# Patient Record
Sex: FEMALE | Race: OTHER | Employment: FULL TIME | URBAN - METROPOLITAN AREA
[De-identification: names, ages, dates, MRNs, and addresses within clinical notes are randomized per-mention and may not be internally consistent; named-entity substitution may affect disease eponyms.]

---

## 2017-03-31 ENCOUNTER — OFFICE VISIT (OUTPATIENT)
Dept: PRIMARY CARE CLINIC | Age: 22
End: 2017-03-31

## 2017-03-31 VITALS
WEIGHT: 90 LBS | TEMPERATURE: 97.1 F | HEART RATE: 77 BPM | DIASTOLIC BLOOD PRESSURE: 80 MMHG | BODY MASS INDEX: 17.67 KG/M2 | HEIGHT: 60 IN | OXYGEN SATURATION: 100 % | SYSTOLIC BLOOD PRESSURE: 120 MMHG

## 2017-03-31 DIAGNOSIS — Z00.00 PHYSICAL EXAM: Primary | ICD-10-CM

## 2017-03-31 DIAGNOSIS — L98.9 SKIN ABNORMALITY: ICD-10-CM

## 2017-03-31 DIAGNOSIS — F41.9 ANXIETY: ICD-10-CM

## 2017-03-31 PROCEDURE — 99385 PREV VISIT NEW AGE 18-39: CPT | Performed by: INTERNAL MEDICINE

## 2017-03-31 ASSESSMENT — ENCOUNTER SYMPTOMS
ABDOMINAL DISTENTION: 0
CONSTIPATION: 0
SHORTNESS OF BREATH: 0
COLOR CHANGE: 1
DIARRHEA: 0
ABDOMINAL PAIN: 0
CHEST TIGHTNESS: 0
GASTROINTESTINAL NEGATIVE: 1
WHEEZING: 0
EYES NEGATIVE: 1
COUGH: 0
BLOOD IN STOOL: 0

## 2017-12-04 ENCOUNTER — OFFICE VISIT (OUTPATIENT)
Dept: FAMILY MEDICINE CLINIC | Age: 22
End: 2017-12-04

## 2017-12-04 VITALS
OXYGEN SATURATION: 99 % | RESPIRATION RATE: 15 BRPM | HEART RATE: 91 BPM | HEIGHT: 60 IN | DIASTOLIC BLOOD PRESSURE: 78 MMHG | BODY MASS INDEX: 17.47 KG/M2 | SYSTOLIC BLOOD PRESSURE: 110 MMHG | WEIGHT: 89 LBS

## 2017-12-04 DIAGNOSIS — Z23 NEED FOR TDAP VACCINATION: ICD-10-CM

## 2017-12-04 DIAGNOSIS — N92.6 IRREGULAR MENSTRUATION: ICD-10-CM

## 2017-12-04 DIAGNOSIS — Z00.00 ANNUAL PHYSICAL EXAM: Primary | ICD-10-CM

## 2017-12-04 DIAGNOSIS — Z23 NEED FOR INFLUENZA VACCINATION: ICD-10-CM

## 2017-12-04 PROCEDURE — 90471 IMMUNIZATION ADMIN: CPT | Performed by: FAMILY MEDICINE

## 2017-12-04 PROCEDURE — 90472 IMMUNIZATION ADMIN EACH ADD: CPT | Performed by: FAMILY MEDICINE

## 2017-12-04 PROCEDURE — 99385 PREV VISIT NEW AGE 18-39: CPT | Performed by: FAMILY MEDICINE

## 2017-12-04 PROCEDURE — 90715 TDAP VACCINE 7 YRS/> IM: CPT | Performed by: FAMILY MEDICINE

## 2017-12-04 PROCEDURE — 90630 INFLUENZA, QUADV, 18-64 YRS, ID, PF, MICRO INJ, 0.1ML (FLUZONE QUADV, PF): CPT | Performed by: FAMILY MEDICINE

## 2017-12-04 RX ORDER — NORETHINDRONE ACETATE AND ETHINYL ESTRADIOL 1MG-20(21)
1 KIT ORAL DAILY
COMMUNITY
End: 2018-01-30 | Stop reason: ALTCHOICE

## 2017-12-04 ASSESSMENT — PATIENT HEALTH QUESTIONNAIRE - PHQ9
2. FEELING DOWN, DEPRESSED OR HOPELESS: 0
SUM OF ALL RESPONSES TO PHQ9 QUESTIONS 1 & 2: 0
1. LITTLE INTEREST OR PLEASURE IN DOING THINGS: 0
SUM OF ALL RESPONSES TO PHQ QUESTIONS 1-9: 0

## 2017-12-04 NOTE — PATIENT INSTRUCTIONS
Patient Education        Heavy Menstrual Periods: Care Instructions  Your Care Instructions    Many women get heavy menstrual periods and painful cramps. For some women, this means passing large blood clots and changing sanitary pads or tampons often. You may also have periods that last longer than 7 days. A change in hormones or an irritation in the uterus can cause heavy bleeding. Women who are overweight are more likely to have heavy menstrual periods. But there may not be a specific cause for your heavy menstrual periods. Your doctor may recommend hormone treatments to slow or stop your periods. If a fibroid (a growth that is not cancer) is causing your heavy bleeding, your doctor may recommend surgery or other treatments to remove the growth. Because blood loss from heavy menstrual periods can make you very tired and weak (anemic), your doctor may recommend that you take extra iron. Follow-up care is a key part of your treatment and safety. Be sure to make and go to all appointments, and call your doctor if you are having problems. It's also a good idea to know your test results and keep a list of the medicines you take. How can you care for yourself at home? · Get plenty of rest.  · Keep a record of your periods. Write down when your period begins and ends and how much flow you have. That means counting the number of pads and tampons you use. Note whether they are soaked. Note any other symptoms. Take this record to your doctor appointments. · Take your medicines exactly as prescribed. Call your doctor if you think you are having a problem with your medicine. · Take pain medicines exactly as directed. ¨ If the doctor gave you a prescription medicine for pain, take it as prescribed. ¨ If you are not taking a prescription pain medicine, ask your doctor if you can take an over-the-counter medicine. · Try to reach a healthy weight.  If you are trying to lose weight, do it slowly with your doctor's advice. · If you are taking iron pills:  ¨ Try to take the pills about 1 hour before or 2 hours after meals. But you may need to take iron with some food to avoid an upset stomach. ¨ Vitamin C (from food or pills) helps your body absorb iron. Try taking iron pills with a glass of orange juice or other citrus fruit juice. ¨ Do not take antacids or drink milk or caffeine drinks (such as coffee, tea, or cola) at the same time or within 2 hours of the time that you take your iron. They can make it hard for your body to absorb the iron. ¨ Iron pills may cause stomach problems, such as heartburn, nausea, diarrhea, constipation, and cramps. Be sure to drink plenty of fluids, and include fruits, vegetables, and fiber in your diet each day. ¨ If you forget to take an iron pill, do not take a double dose of iron the next time you take a pill. ¨ Keep iron pills out of the reach of small children. An overdose of iron can be very dangerous. When should you call for help? Call 911 anytime you think you may need emergency care. For example, call if:  · You passed out (lost consciousness). · You have sudden, severe pain in your belly or pelvis. Call your doctor now or seek immediate medical care if:  · You have severe vaginal bleeding. This means that you are soaking through your usual pads or tampons each hour for 2 or more hours. · You are dizzy or lightheaded, or you feel like you may faint. · You have belly or pelvic pain when not menstruating. · You have a fever. · You have vaginal discharge with a bad odor. Watch closely for changes in your health, and be sure to contact your doctor if:  · Your heavy periods are disrupting your life. · You have vaginal bleeding when you do not expect it or after menopause. · You do not get better as expected. Where can you learn more? Go to https://[email or web address]. org and sign in to your [employer] account.  Enter F477 in the WhenSoon box to learn

## 2017-12-05 DIAGNOSIS — Z00.00 ANNUAL PHYSICAL EXAM: ICD-10-CM

## 2017-12-05 DIAGNOSIS — N92.6 IRREGULAR MENSTRUATION: ICD-10-CM

## 2017-12-05 LAB
BASOPHILS ABSOLUTE: 0.1 K/UL (ref 0–0.2)
BASOPHILS RELATIVE PERCENT: 1 %
CHOLESTEROL, FASTING: 132 MG/DL (ref 0–199)
EOSINOPHILS ABSOLUTE: 0.2 K/UL (ref 0–0.6)
EOSINOPHILS RELATIVE PERCENT: 3.1 %
FOLLICLE STIMULATING HORMONE: 5.8 MIU/ML
GLUCOSE FASTING: 84 MG/DL (ref 70–99)
HCT VFR BLD CALC: 37.9 % (ref 36–48)
HDLC SERPL-MCNC: 65 MG/DL (ref 40–60)
HEMOGLOBIN: 12.1 G/DL (ref 12–16)
LDL CHOLESTEROL CALCULATED: 54 MG/DL
LUTEINIZING HORMONE: 5 MIU/ML
LYMPHOCYTES ABSOLUTE: 1.6 K/UL (ref 1–5.1)
LYMPHOCYTES RELATIVE PERCENT: 20.1 %
MCH RBC QN AUTO: 27.7 PG (ref 26–34)
MCHC RBC AUTO-ENTMCNC: 31.9 G/DL (ref 31–36)
MCV RBC AUTO: 86.9 FL (ref 80–100)
MONOCYTES ABSOLUTE: 0.7 K/UL (ref 0–1.3)
MONOCYTES RELATIVE PERCENT: 9.3 %
NEUTROPHILS ABSOLUTE: 5.2 K/UL (ref 1.7–7.7)
NEUTROPHILS RELATIVE PERCENT: 66.5 %
PDW BLD-RTO: 14.6 % (ref 12.4–15.4)
PLATELET # BLD: 351 K/UL (ref 135–450)
PMV BLD AUTO: 9.4 FL (ref 5–10.5)
PROGESTERONE LEVEL: <0.05 NG/ML
RBC # BLD: 4.37 M/UL (ref 4–5.2)
TRIGLYCERIDE, FASTING: 63 MG/DL (ref 0–150)
TSH REFLEX: 2.25 UIU/ML (ref 0.27–4.2)
VLDLC SERPL CALC-MCNC: 13 MG/DL
WBC # BLD: 7.8 K/UL (ref 4–11)

## 2017-12-07 ENCOUNTER — TELEPHONE (OUTPATIENT)
Dept: FAMILY MEDICINE CLINIC | Age: 22
End: 2017-12-07

## 2017-12-07 LAB
SEX HORMONE BINDING GLOBULIN: 96 NMOL/L (ref 30–135)
TESTOSTERONE FREE-NONMALE: 2.6 PG/ML (ref 0.8–7.4)
TESTOSTERONE TOTAL: 31 NG/DL (ref 20–70)

## 2018-01-30 ENCOUNTER — OFFICE VISIT (OUTPATIENT)
Dept: FAMILY MEDICINE CLINIC | Age: 23
End: 2018-01-30

## 2018-01-30 VITALS
HEART RATE: 90 BPM | RESPIRATION RATE: 15 BRPM | BODY MASS INDEX: 18.06 KG/M2 | DIASTOLIC BLOOD PRESSURE: 68 MMHG | WEIGHT: 92 LBS | OXYGEN SATURATION: 98 % | HEIGHT: 60 IN | SYSTOLIC BLOOD PRESSURE: 112 MMHG

## 2018-01-30 DIAGNOSIS — F41.9 ANXIETY: ICD-10-CM

## 2018-01-30 DIAGNOSIS — R07.9 CHEST PAIN, UNSPECIFIED TYPE: Primary | ICD-10-CM

## 2018-01-30 PROCEDURE — G8427 DOCREV CUR MEDS BY ELIG CLIN: HCPCS | Performed by: FAMILY MEDICINE

## 2018-01-30 PROCEDURE — G8419 CALC BMI OUT NRM PARAM NOF/U: HCPCS | Performed by: FAMILY MEDICINE

## 2018-01-30 PROCEDURE — 1036F TOBACCO NON-USER: CPT | Performed by: FAMILY MEDICINE

## 2018-01-30 PROCEDURE — 99213 OFFICE O/P EST LOW 20 MIN: CPT | Performed by: FAMILY MEDICINE

## 2018-01-30 PROCEDURE — G8484 FLU IMMUNIZE NO ADMIN: HCPCS | Performed by: FAMILY MEDICINE

## 2018-01-30 RX ORDER — DROSPIRENONE AND ETHINYL ESTRADIOL 0.03MG-3MG
1 KIT ORAL DAILY
COMMUNITY
End: 2019-10-01 | Stop reason: ALTCHOICE

## 2018-02-15 ENCOUNTER — TELEPHONE (OUTPATIENT)
Dept: FAMILY MEDICINE CLINIC | Age: 23
End: 2018-02-15

## 2018-02-15 DIAGNOSIS — F41.9 ANXIETY: Primary | ICD-10-CM

## 2018-02-16 RX ORDER — PAROXETINE 10 MG/1
10 TABLET, FILM COATED ORAL EVERY MORNING
Qty: 30 TABLET | Refills: 0 | Status: SHIPPED | OUTPATIENT
Start: 2018-02-16 | End: 2018-03-01 | Stop reason: CLARIF

## 2018-02-21 ENCOUNTER — TELEPHONE (OUTPATIENT)
Dept: FAMILY MEDICINE CLINIC | Age: 23
End: 2018-02-21

## 2018-02-21 NOTE — TELEPHONE ENCOUNTER
Pt would like a call back to discuss some concerns that she'ds having regarding her anxiety medication. She says that she started taking medication yesterday without food and started feeling stomach pain, and felt nauseous all day. This morning she felt a rapid heart beat, and had lack of sleep.  Please advise

## 2018-02-22 NOTE — TELEPHONE ENCOUNTER
Called and spoke with pt. She is having side effects from Paxil. 30 min after first dose 2 days ago, felt nauseated with mild HA, went on the next day. The next morning she woke up with rapid heartbeat, and difficulty sleeping. Patient is concerned about her rapid heart beating because this is not the first time that she's had this issue. She made appointment with cardiologist.  I told the patient to go ahead and hold the Paxil for now, see cardiologist.  Katia Beckford to follow-up in the clinic with me once she seen a cardiologist.  Patient expressed understanding.

## 2018-03-01 ENCOUNTER — OFFICE VISIT (OUTPATIENT)
Dept: CARDIOLOGY CLINIC | Age: 23
End: 2018-03-01

## 2018-03-01 VITALS
HEART RATE: 115 BPM | WEIGHT: 91 LBS | BODY MASS INDEX: 17.77 KG/M2 | SYSTOLIC BLOOD PRESSURE: 120 MMHG | DIASTOLIC BLOOD PRESSURE: 80 MMHG

## 2018-03-01 DIAGNOSIS — R00.2 HEART PALPITATIONS: ICD-10-CM

## 2018-03-01 DIAGNOSIS — I49.9 IRREGULAR HEART RATE: Primary | ICD-10-CM

## 2018-03-01 DIAGNOSIS — F41.9 ANXIETY: ICD-10-CM

## 2018-03-01 PROCEDURE — G8419 CALC BMI OUT NRM PARAM NOF/U: HCPCS | Performed by: INTERNAL MEDICINE

## 2018-03-01 PROCEDURE — 99214 OFFICE O/P EST MOD 30 MIN: CPT | Performed by: INTERNAL MEDICINE

## 2018-03-01 PROCEDURE — 1036F TOBACCO NON-USER: CPT | Performed by: INTERNAL MEDICINE

## 2018-03-01 PROCEDURE — G8484 FLU IMMUNIZE NO ADMIN: HCPCS | Performed by: INTERNAL MEDICINE

## 2018-03-01 PROCEDURE — G8427 DOCREV CUR MEDS BY ELIG CLIN: HCPCS | Performed by: INTERNAL MEDICINE

## 2018-03-01 PROCEDURE — 93000 ELECTROCARDIOGRAM COMPLETE: CPT | Performed by: INTERNAL MEDICINE

## 2018-03-01 ASSESSMENT — ENCOUNTER SYMPTOMS
ALLERGIC/IMMUNOLOGIC NEGATIVE: 1
RESPIRATORY NEGATIVE: 1
EYES NEGATIVE: 1
GASTROINTESTINAL NEGATIVE: 1

## 2018-03-01 NOTE — PROGRESS NOTES
paxil. Would consider Cardizem if palpitations continue. Other than sinus tachycardia she has a normal ECG.

## 2018-03-16 ENCOUNTER — HOSPITAL ENCOUNTER (OUTPATIENT)
Dept: NON INVASIVE DIAGNOSTICS | Age: 23
Discharge: HOME | End: 2018-03-16
Attending: INTERNAL MEDICINE | Admitting: INTERNAL MEDICINE

## 2018-03-16 DIAGNOSIS — I49.9 CARDIAC ARRHYTHMIA: ICD-10-CM

## 2018-03-16 LAB
LV EF: 58 %
LVEF MODALITY: NORMAL

## 2018-03-20 ENCOUNTER — TELEPHONE (OUTPATIENT)
Dept: CARDIOLOGY CLINIC | Age: 23
End: 2018-03-20

## 2018-03-21 ENCOUNTER — OFFICE VISIT (OUTPATIENT)
Dept: CARDIOLOGY CLINIC | Age: 23
End: 2018-03-21

## 2018-03-21 VITALS
DIASTOLIC BLOOD PRESSURE: 60 MMHG | SYSTOLIC BLOOD PRESSURE: 110 MMHG | BODY MASS INDEX: 17.81 KG/M2 | WEIGHT: 91.2 LBS | HEART RATE: 88 BPM

## 2018-03-21 DIAGNOSIS — R00.2 HEART PALPITATIONS: Primary | ICD-10-CM

## 2018-03-21 PROCEDURE — 99213 OFFICE O/P EST LOW 20 MIN: CPT | Performed by: INTERNAL MEDICINE

## 2018-03-21 PROCEDURE — G8419 CALC BMI OUT NRM PARAM NOF/U: HCPCS | Performed by: INTERNAL MEDICINE

## 2018-03-21 PROCEDURE — G8482 FLU IMMUNIZE ORDER/ADMIN: HCPCS | Performed by: INTERNAL MEDICINE

## 2018-03-21 PROCEDURE — G8427 DOCREV CUR MEDS BY ELIG CLIN: HCPCS | Performed by: INTERNAL MEDICINE

## 2018-03-21 PROCEDURE — 1036F TOBACCO NON-USER: CPT | Performed by: INTERNAL MEDICINE

## 2018-03-21 ASSESSMENT — ENCOUNTER SYMPTOMS
ALLERGIC/IMMUNOLOGIC NEGATIVE: 1
RESPIRATORY NEGATIVE: 1
GASTROINTESTINAL NEGATIVE: 1
EYES NEGATIVE: 1

## 2018-04-18 ENCOUNTER — OFFICE VISIT (OUTPATIENT)
Dept: FAMILY MEDICINE CLINIC | Age: 23
End: 2018-04-18

## 2018-04-18 VITALS
HEART RATE: 96 BPM | HEIGHT: 60 IN | RESPIRATION RATE: 16 BRPM | DIASTOLIC BLOOD PRESSURE: 66 MMHG | OXYGEN SATURATION: 99 % | BODY MASS INDEX: 18.26 KG/M2 | SYSTOLIC BLOOD PRESSURE: 100 MMHG | WEIGHT: 93 LBS

## 2018-04-18 DIAGNOSIS — M54.9 MID-BACK PAIN, ACUTE: Primary | ICD-10-CM

## 2018-04-18 DIAGNOSIS — Z78.9 IMMUNE TO MUMPS: ICD-10-CM

## 2018-04-18 DIAGNOSIS — M54.9 MID-BACK PAIN, ACUTE: ICD-10-CM

## 2018-04-18 LAB
BILIRUBIN, POC: NORMAL
BLOOD URINE, POC: NORMAL
CLARITY, POC: CLEAR
COLOR, POC: YELLOW
D DIMER: <200 NG/ML DDU (ref 0–229)
GLUCOSE URINE, POC: NORMAL
KETONES, POC: NORMAL
LEUKOCYTE EST, POC: NORMAL
NITRITE, POC: NORMAL
PH, POC: 7
PROTEIN, POC: NORMAL
SPECIFIC GRAVITY, POC: 1.02
UROBILINOGEN, POC: 0.2

## 2018-04-18 PROCEDURE — 81002 URINALYSIS NONAUTO W/O SCOPE: CPT | Performed by: FAMILY MEDICINE

## 2018-04-18 PROCEDURE — 99213 OFFICE O/P EST LOW 20 MIN: CPT | Performed by: FAMILY MEDICINE

## 2018-04-18 PROCEDURE — G8419 CALC BMI OUT NRM PARAM NOF/U: HCPCS | Performed by: FAMILY MEDICINE

## 2018-04-18 PROCEDURE — G8427 DOCREV CUR MEDS BY ELIG CLIN: HCPCS | Performed by: FAMILY MEDICINE

## 2018-04-18 PROCEDURE — 1036F TOBACCO NON-USER: CPT | Performed by: FAMILY MEDICINE

## 2018-04-19 ENCOUNTER — TELEPHONE (OUTPATIENT)
Dept: FAMILY MEDICINE CLINIC | Age: 23
End: 2018-04-19

## 2018-04-20 LAB — MUV IGG SER QL: 88.1 AU/ML

## 2018-06-04 ENCOUNTER — OFFICE VISIT (OUTPATIENT)
Dept: FAMILY MEDICINE CLINIC | Age: 23
End: 2018-06-04

## 2018-06-04 ENCOUNTER — TELEPHONE (OUTPATIENT)
Dept: CARDIOLOGY CLINIC | Age: 23
End: 2018-06-04

## 2018-06-04 VITALS
OXYGEN SATURATION: 98 % | DIASTOLIC BLOOD PRESSURE: 80 MMHG | HEART RATE: 102 BPM | WEIGHT: 92.6 LBS | HEIGHT: 60 IN | RESPIRATION RATE: 16 BRPM | BODY MASS INDEX: 18.18 KG/M2 | SYSTOLIC BLOOD PRESSURE: 120 MMHG

## 2018-06-04 DIAGNOSIS — M54.50 ACUTE BILATERAL LOW BACK PAIN WITHOUT SCIATICA: Primary | ICD-10-CM

## 2018-06-04 DIAGNOSIS — R87.619 ABNORMAL CERVICAL PAPANICOLAOU SMEAR, UNSPECIFIED ABNORMAL PAP FINDING: ICD-10-CM

## 2018-06-04 DIAGNOSIS — R51.9 NONINTRACTABLE HEADACHE, UNSPECIFIED CHRONICITY PATTERN, UNSPECIFIED HEADACHE TYPE: ICD-10-CM

## 2018-06-04 PROBLEM — A63.0 HPV (HUMAN PAPILLOMA VIRUS) ANOGENITAL INFECTION: Status: ACTIVE | Noted: 2018-05-01

## 2018-06-04 LAB
BILIRUBIN, POC: NORMAL
BLOOD URINE, POC: NORMAL
CLARITY, POC: CLEAR
COLOR, POC: YELLOW
GLUCOSE URINE, POC: NORMAL
KETONES, POC: NORMAL
LEUKOCYTE EST, POC: NORMAL
NITRITE, POC: NORMAL
PH, POC: 6.5
PROTEIN, POC: NORMAL
SPECIFIC GRAVITY, POC: 1.01
UROBILINOGEN, POC: 0.2

## 2018-06-04 PROCEDURE — 1036F TOBACCO NON-USER: CPT | Performed by: NURSE PRACTITIONER

## 2018-06-04 PROCEDURE — 99214 OFFICE O/P EST MOD 30 MIN: CPT | Performed by: NURSE PRACTITIONER

## 2018-06-04 PROCEDURE — G8427 DOCREV CUR MEDS BY ELIG CLIN: HCPCS | Performed by: NURSE PRACTITIONER

## 2018-06-04 PROCEDURE — G8419 CALC BMI OUT NRM PARAM NOF/U: HCPCS | Performed by: NURSE PRACTITIONER

## 2018-06-04 PROCEDURE — 81002 URINALYSIS NONAUTO W/O SCOPE: CPT | Performed by: NURSE PRACTITIONER

## 2018-06-04 RX ORDER — AMOXICILLIN 500 MG/1
500 CAPSULE ORAL 2 TIMES DAILY
Qty: 20 CAPSULE | Refills: 0 | Status: SHIPPED | OUTPATIENT
Start: 2018-06-04 | End: 2018-06-14

## 2018-06-04 ASSESSMENT — ENCOUNTER SYMPTOMS
COUGH: 0
SINUS PAIN: 0
ABDOMINAL PAIN: 0
SHORTNESS OF BREATH: 1
SORE THROAT: 0
BACK PAIN: 1

## 2018-06-07 ENCOUNTER — OFFICE VISIT (OUTPATIENT)
Dept: CARDIOLOGY CLINIC | Age: 23
End: 2018-06-07

## 2018-06-07 VITALS
WEIGHT: 93 LBS | BODY MASS INDEX: 18.16 KG/M2 | DIASTOLIC BLOOD PRESSURE: 60 MMHG | HEART RATE: 108 BPM | SYSTOLIC BLOOD PRESSURE: 132 MMHG

## 2018-06-07 DIAGNOSIS — R00.2 HEART PALPITATIONS: Primary | ICD-10-CM

## 2018-06-07 PROCEDURE — 93000 ELECTROCARDIOGRAM COMPLETE: CPT | Performed by: INTERNAL MEDICINE

## 2018-06-07 PROCEDURE — 1036F TOBACCO NON-USER: CPT | Performed by: INTERNAL MEDICINE

## 2018-06-07 PROCEDURE — G8419 CALC BMI OUT NRM PARAM NOF/U: HCPCS | Performed by: INTERNAL MEDICINE

## 2018-06-07 PROCEDURE — G8427 DOCREV CUR MEDS BY ELIG CLIN: HCPCS | Performed by: INTERNAL MEDICINE

## 2018-06-07 PROCEDURE — 99213 OFFICE O/P EST LOW 20 MIN: CPT | Performed by: INTERNAL MEDICINE

## 2018-06-07 RX ORDER — METOPROLOL SUCCINATE 25 MG/1
25 TABLET, EXTENDED RELEASE ORAL DAILY
Qty: 30 TABLET | Refills: 5 | Status: SHIPPED | OUTPATIENT
Start: 2018-06-07 | End: 2018-08-03 | Stop reason: ALTCHOICE

## 2018-06-07 ASSESSMENT — ENCOUNTER SYMPTOMS
EYES NEGATIVE: 1
ALLERGIC/IMMUNOLOGIC NEGATIVE: 1
GASTROINTESTINAL NEGATIVE: 1
RESPIRATORY NEGATIVE: 1

## 2018-08-03 ENCOUNTER — OFFICE VISIT (OUTPATIENT)
Dept: INTERNAL MEDICINE CLINIC | Age: 23
End: 2018-08-03

## 2018-08-03 VITALS
TEMPERATURE: 97 F | DIASTOLIC BLOOD PRESSURE: 74 MMHG | HEART RATE: 81 BPM | OXYGEN SATURATION: 99 % | WEIGHT: 95 LBS | HEIGHT: 60 IN | BODY MASS INDEX: 18.65 KG/M2 | SYSTOLIC BLOOD PRESSURE: 110 MMHG

## 2018-08-03 DIAGNOSIS — J01.90 SUBACUTE SINUSITIS, UNSPECIFIED LOCATION: ICD-10-CM

## 2018-08-03 DIAGNOSIS — F41.9 ANXIETY: Primary | ICD-10-CM

## 2018-08-03 DIAGNOSIS — R06.02 SOB (SHORTNESS OF BREATH): ICD-10-CM

## 2018-08-03 PROCEDURE — 1036F TOBACCO NON-USER: CPT | Performed by: NURSE PRACTITIONER

## 2018-08-03 PROCEDURE — 99213 OFFICE O/P EST LOW 20 MIN: CPT | Performed by: NURSE PRACTITIONER

## 2018-08-03 PROCEDURE — G8420 CALC BMI NORM PARAMETERS: HCPCS | Performed by: NURSE PRACTITIONER

## 2018-08-03 PROCEDURE — G8427 DOCREV CUR MEDS BY ELIG CLIN: HCPCS | Performed by: NURSE PRACTITIONER

## 2018-08-03 RX ORDER — AMOXICILLIN AND CLAVULANATE POTASSIUM 875; 125 MG/1; MG/1
1 TABLET, FILM COATED ORAL 2 TIMES DAILY
Qty: 14 TABLET | Refills: 0 | Status: SHIPPED | OUTPATIENT
Start: 2018-08-03 | End: 2018-08-10

## 2018-08-03 ASSESSMENT — PATIENT HEALTH QUESTIONNAIRE - PHQ9
2. FEELING DOWN, DEPRESSED OR HOPELESS: 0
1. LITTLE INTEREST OR PLEASURE IN DOING THINGS: 0
SUM OF ALL RESPONSES TO PHQ9 QUESTIONS 1 & 2: 0
SUM OF ALL RESPONSES TO PHQ QUESTIONS 1-9: 0

## 2018-08-03 NOTE — PROGRESS NOTES
Subjective:      Patient ID: Eli Marsh is a 26 y.o. female. HPI  27 yo female presents to clinic to establish care. She has c/o Congestion, chest tightness, symptoms ongoing 1 month. Was treated with amoxicillin 1 month ago, but symptoms never resolved. Denies cough and hoaseness. There is mild sinus pressure, had been getting migraines. Denies fever. Rhinorrhea is present in the morning. Has been checked by allergist and allergy testing negative. Reports some mild pnd, mucous in the AM.     Patient past medical history, family history, social, and smoking reviewed and updated as pertinent. Health maintenance has been reviewed. Prior to Visit Medications    Medication Sig Taking? Authorizing Provider   amoxicillin-clavulanate (AUGMENTIN) 875-125 MG per tablet Take 1 tablet by mouth 2 times daily for 7 days Yes ODALYS Crabtree - CNP   drospirenone-ethinyl estradiol 3-0.03 MG TABS Take 1 tablet by mouth daily Yes Historical Provider, MD         Also reports ongoing fatigue  Review of Systems   Constitutional: Positive for fatigue. HENT: Positive for congestion, rhinorrhea and sinus pressure. Respiratory: Positive for chest tightness. Negative for cough. Neurological: Positive for headaches. Objective:   Physical Exam   Constitutional: She is oriented to person, place, and time. She appears well-developed and well-nourished. HENT:   Right Ear: Hearing, tympanic membrane, external ear and ear canal normal.   Left Ear: Hearing, tympanic membrane, external ear and ear canal normal.   Mouth/Throat: Oropharynx is clear and moist.   Cardiovascular: Normal rate, regular rhythm and normal heart sounds. Pulmonary/Chest: Effort normal and breath sounds normal.   Lymphadenopathy:     She has no cervical adenopathy. Neurological: She is alert and oriented to person, place, and time. Assessment:       Diagnosis Orders   1. Anxiety     2. Subacute sinusitis, unspecified location     3.  SOB

## 2018-08-04 LAB
BASOPHILS ABSOLUTE: 0 K/UL (ref 0–0.2)
BASOPHILS RELATIVE PERCENT: 0.9 %
EOSINOPHILS ABSOLUTE: 0.4 K/UL (ref 0–0.6)
EOSINOPHILS RELATIVE PERCENT: 6.7 %
HCT VFR BLD CALC: 37.8 % (ref 36–48)
HEMOGLOBIN: 12.3 G/DL (ref 12–16)
LYMPHOCYTES ABSOLUTE: 2.1 K/UL (ref 1–5.1)
LYMPHOCYTES RELATIVE PERCENT: 39.4 %
MCH RBC QN AUTO: 28.8 PG (ref 26–34)
MCHC RBC AUTO-ENTMCNC: 32.4 G/DL (ref 31–36)
MCV RBC AUTO: 89 FL (ref 80–100)
MONOCYTES ABSOLUTE: 0.5 K/UL (ref 0–1.3)
MONOCYTES RELATIVE PERCENT: 9.6 %
NEUTROPHILS ABSOLUTE: 2.3 K/UL (ref 1.7–7.7)
NEUTROPHILS RELATIVE PERCENT: 43.4 %
PDW BLD-RTO: 14.9 % (ref 12.4–15.4)
PLATELET # BLD: 360 K/UL (ref 135–450)
PMV BLD AUTO: 8.8 FL (ref 5–10.5)
RBC # BLD: 4.25 M/UL (ref 4–5.2)
TSH REFLEX: 2 UIU/ML (ref 0.27–4.2)
WBC # BLD: 5.3 K/UL (ref 4–11)

## 2018-08-07 ASSESSMENT — ENCOUNTER SYMPTOMS
CHEST TIGHTNESS: 1
SINUS PRESSURE: 1
RHINORRHEA: 1
COUGH: 0

## 2018-09-27 ENCOUNTER — OFFICE VISIT (OUTPATIENT)
Dept: PRIMARY CARE CLINIC | Age: 23
End: 2018-09-27
Payer: COMMERCIAL

## 2018-09-27 VITALS
HEIGHT: 60 IN | DIASTOLIC BLOOD PRESSURE: 68 MMHG | TEMPERATURE: 98.3 F | SYSTOLIC BLOOD PRESSURE: 98 MMHG | WEIGHT: 95 LBS | HEART RATE: 104 BPM | RESPIRATION RATE: 16 BRPM | BODY MASS INDEX: 18.65 KG/M2 | OXYGEN SATURATION: 95 %

## 2018-09-27 DIAGNOSIS — J02.9 SORE THROAT: Primary | ICD-10-CM

## 2018-09-27 PROCEDURE — G8427 DOCREV CUR MEDS BY ELIG CLIN: HCPCS | Performed by: INTERNAL MEDICINE

## 2018-09-27 PROCEDURE — 1036F TOBACCO NON-USER: CPT | Performed by: INTERNAL MEDICINE

## 2018-09-27 PROCEDURE — 99213 OFFICE O/P EST LOW 20 MIN: CPT | Performed by: INTERNAL MEDICINE

## 2018-09-27 PROCEDURE — G8420 CALC BMI NORM PARAMETERS: HCPCS | Performed by: INTERNAL MEDICINE

## 2018-09-27 RX ORDER — AZITHROMYCIN 250 MG/1
TABLET, FILM COATED ORAL
Qty: 1 PACKET | Refills: 0 | Status: SHIPPED | OUTPATIENT
Start: 2018-09-27 | End: 2018-10-01

## 2018-09-27 NOTE — PROGRESS NOTES
Jairon Tripp  YOB: 1995    Date of Service:  9/27/2018    Chief Complaint:   Jairon Tripp is a 26 y.o. female who presents for sore throat. HPI: patient came here for sore throat . 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 Patient described as scratch throat associated with sinus pressure. Patient denies any other symptoms. Review of Systems:  Constitutional: Negative for chills, fever, malaise/fatigue and weight loss. HENT: Negative for headaches, ear discharge, ear pain, hearing loss, sore throat+,   blurry vision and double vision. Respiratory: Negative for cough, hemoptysis, sputum production, shortness of breath and wheezing. Cardiovascular: Negative for chest pain, palpitations, orthopnea, claudication and leg swelling. Gastrointestinal: Negative for abdominal pain, nausea and vomiting, constipation, diarrhea, heartburn, blood in stool, melena. Genitourinary: Negative for dysuria, flank pain, frequency, hematuria and urgency. Musculoskeletal: Negative for back pain, myalgias and neck pain. Neurological: Negative for dizziness, seizure, sensory change, focal weakness, loss of consciousness . Psychiatric/Behavioral: Negative for depression and substance abuse. The patient does not have insomnia. Vitals:    09/27/18 1257   BP: 98/68   Pulse: 104   Resp: 16   Temp: 98.3 °F (36.8 °C)   SpO2: 95%     Wt Readings from Last 3 Encounters:   09/27/18 95 lb (43.1 kg)   08/03/18 95 lb (43.1 kg)   06/07/18 93 lb (42.2 kg)     BP Readings from Last 3 Encounters:   09/27/18 98/68   08/03/18 110/74   06/07/18 132/60         Physical Exam:  Constitutional:   appears well-nourished. No distress. HENT:   Head: Normocephalic. Right Ear: External ear normal.   Left Ear: External ear normal.   Mouth/Throat: Oropharynx is clear and moist.  pharyngeal erythema +    Eyes: Conjunctivae and EOM are normal. Pupils are equal, round, and reactive to light.  Right eye exhibits

## 2018-10-02 DIAGNOSIS — R42 VERTIGO: Primary | ICD-10-CM

## 2018-10-03 ENCOUNTER — OFFICE VISIT (OUTPATIENT)
Dept: CARDIOLOGY CLINIC | Age: 23
End: 2018-10-03
Payer: COMMERCIAL

## 2018-10-03 ENCOUNTER — OFFICE VISIT (OUTPATIENT)
Dept: ENT CLINIC | Age: 23
End: 2018-10-03
Payer: COMMERCIAL

## 2018-10-03 VITALS
WEIGHT: 96 LBS | HEART RATE: 95 BPM | HEIGHT: 60 IN | RESPIRATION RATE: 16 BRPM | BODY MASS INDEX: 18.85 KG/M2 | SYSTOLIC BLOOD PRESSURE: 106 MMHG | DIASTOLIC BLOOD PRESSURE: 61 MMHG

## 2018-10-03 VITALS
WEIGHT: 96.2 LBS | DIASTOLIC BLOOD PRESSURE: 60 MMHG | SYSTOLIC BLOOD PRESSURE: 110 MMHG | HEART RATE: 82 BPM | BODY MASS INDEX: 18.79 KG/M2

## 2018-10-03 DIAGNOSIS — R00.2 HEART PALPITATIONS: Primary | ICD-10-CM

## 2018-10-03 DIAGNOSIS — G43.009 MIGRAINE WITHOUT AURA AND WITHOUT STATUS MIGRAINOSUS, NOT INTRACTABLE: Primary | ICD-10-CM

## 2018-10-03 DIAGNOSIS — R42 DIZZINESS: ICD-10-CM

## 2018-10-03 PROCEDURE — 99213 OFFICE O/P EST LOW 20 MIN: CPT | Performed by: INTERNAL MEDICINE

## 2018-10-03 PROCEDURE — 1036F TOBACCO NON-USER: CPT | Performed by: OTOLARYNGOLOGY

## 2018-10-03 PROCEDURE — G8484 FLU IMMUNIZE NO ADMIN: HCPCS | Performed by: INTERNAL MEDICINE

## 2018-10-03 PROCEDURE — G8420 CALC BMI NORM PARAMETERS: HCPCS | Performed by: INTERNAL MEDICINE

## 2018-10-03 PROCEDURE — G8420 CALC BMI NORM PARAMETERS: HCPCS | Performed by: OTOLARYNGOLOGY

## 2018-10-03 PROCEDURE — G8484 FLU IMMUNIZE NO ADMIN: HCPCS | Performed by: OTOLARYNGOLOGY

## 2018-10-03 PROCEDURE — 1036F TOBACCO NON-USER: CPT | Performed by: INTERNAL MEDICINE

## 2018-10-03 PROCEDURE — G8427 DOCREV CUR MEDS BY ELIG CLIN: HCPCS | Performed by: OTOLARYNGOLOGY

## 2018-10-03 PROCEDURE — 99203 OFFICE O/P NEW LOW 30 MIN: CPT | Performed by: OTOLARYNGOLOGY

## 2018-10-03 PROCEDURE — G8427 DOCREV CUR MEDS BY ELIG CLIN: HCPCS | Performed by: INTERNAL MEDICINE

## 2018-10-03 ASSESSMENT — ENCOUNTER SYMPTOMS
BLOOD IN STOOL: 0
SINUS PAIN: 0
NAUSEA: 0
BACK PAIN: 0
SHORTNESS OF BREATH: 0
FACIAL SWELLING: 0
DIARRHEA: 0
EYE ITCHING: 0
EYE DISCHARGE: 0
WHEEZING: 0
TROUBLE SWALLOWING: 0
SORE THROAT: 0
CONSTIPATION: 0
VOMITING: 0
GASTROINTESTINAL NEGATIVE: 1
EYES NEGATIVE: 1
PHOTOPHOBIA: 0
SINUS PRESSURE: 0
COUGH: 0
RHINORRHEA: 0
COLOR CHANGE: 0
VOICE CHANGE: 0
CHOKING: 0
RESPIRATORY NEGATIVE: 1
STRIDOR: 0
ALLERGIC/IMMUNOLOGIC NEGATIVE: 1

## 2018-10-03 NOTE — PROGRESS NOTES
[facility]  [address] [id number] [facility], [address] [place] [address] Larry  P: [phone]  F: [phone]       Patient     Dona Stubbs  1995    Chief Complaint     Chief Complaint   Patient presents with    New Patient     lightheaded, left ear pain, sinus trouble        History of Present Illness     Carli Escalante is a pleasant 81-year-old female who presented today for lightheadedness, dizziness, possible sinus infection. She states for the past 3 weeks she has experienced a feeling of lightheadedness or she feels like she is about to pass out. She denies any spinning sensation. She does admit to some left-sided otalgia. Denies aural fullness. Denies tinnitus. Denies any history of ear surgery or ear infections. Denies otorrhea. She does have a history of migraines. She has been getting frequent headaches with associated photophobia. Her dizziness typically occurs when she is sitting first and appears of time. It is alleviated by walking around. She recently saw a cardiologist for a racing heartbeat. He was placed on a beta blocker however she did not take this medication. He does not take any medications for her headaches. She denies any symptoms significant for sinusitis. Past Medical History     Past Medical History:   Diagnosis Date    Anxiety 3/1/2018    Tachycardia 01/28/208    [facility]. has been checked out by cardiology and everything was normal       Past Surgical History     No past surgical history on file. Family History     Family History   Problem Relation Age of Onset    High Blood Pressure Father     Breast Cancer Maternal Aunt        Social History     Social History     Social History    Marital status: Single     Spouse name: N/A    Number of children: N/A    Years of education: N/A     Occupational History    Not on file.      Social History Main Topics    Smoking status: Never Smoker    Smokeless tobacco: Never Used    Alcohol use Yes      Comment: 4-5 times per month    Drug use: No    Sexual activity: Yes     Partners: Male      Comment: boyfriend     Other Topics Concern    Not on file     Social History Narrative    No narrative on file       Allergies     No Known Allergies    Medications     Current Outpatient Prescriptions   Medication Sig Dispense Refill    drospirenone-ethinyl estradiol 3-0.03 MG TABS Take 1 tablet by mouth daily       No current facility-administered medications for this visit. Review of Systems     Review of Systems   Constitutional: Negative for activity change, appetite change, chills, diaphoresis, fatigue, fever and unexpected weight change. HENT: Negative for congestion, dental problem, drooling, ear discharge, ear pain, facial swelling, hearing loss, mouth sores, nosebleeds, postnasal drip, rhinorrhea, sinus pain, sinus pressure, sneezing, sore throat, tinnitus, trouble swallowing and voice change. Eyes: Negative for photophobia, discharge, itching and visual disturbance. Respiratory: Negative for cough, choking, shortness of breath, wheezing and stridor. Gastrointestinal: Negative for blood in stool, constipation, diarrhea, nausea and vomiting. Endocrine: Negative for cold intolerance, heat intolerance, polyphagia and polyuria. Musculoskeletal: Negative for back pain, gait problem, neck pain and neck stiffness. Skin: Negative for color change, pallor, rash and wound. Neurological: Positive for dizziness, light-headedness and headaches. Negative for syncope, facial asymmetry, speech difficulty and numbness. Hematological: Negative for adenopathy. Does not bruise/bleed easily. Psychiatric/Behavioral: Negative for agitation, confusion and sleep disturbance. Physical Exam     Vitals:    10/03/18 1550   BP: 106/61   Pulse: 95   Resp: 16       Physical Exam   Constitutional: She is oriented to person, place, and time. She appears well-developed and well-nourished.    HENT:   Head:

## 2018-10-03 NOTE — PROGRESS NOTES
concerned about high hR. Would give toprol xl 25 mg 1/2 tablet q am but she hasnt taken it. She can take it as needed but her friend didn't like that med so she took that under advisement.

## 2018-10-29 ENCOUNTER — TELEPHONE (OUTPATIENT)
Dept: CARDIOLOGY CLINIC | Age: 23
End: 2018-10-29

## 2018-11-05 ENCOUNTER — TELEPHONE (OUTPATIENT)
Dept: CARDIOLOGY CLINIC | Age: 23
End: 2018-11-05

## 2018-11-21 ENCOUNTER — OFFICE VISIT (OUTPATIENT)
Dept: CARDIOLOGY CLINIC | Age: 23
End: 2018-11-21
Payer: COMMERCIAL

## 2018-11-21 ENCOUNTER — OFFICE VISIT (OUTPATIENT)
Dept: PRIMARY CARE CLINIC | Age: 23
End: 2018-11-21
Payer: COMMERCIAL

## 2018-11-21 VITALS
BODY MASS INDEX: 19.63 KG/M2 | TEMPERATURE: 97.5 F | SYSTOLIC BLOOD PRESSURE: 100 MMHG | HEIGHT: 60 IN | DIASTOLIC BLOOD PRESSURE: 68 MMHG | WEIGHT: 100 LBS | RESPIRATION RATE: 16 BRPM

## 2018-11-21 VITALS
DIASTOLIC BLOOD PRESSURE: 60 MMHG | BODY MASS INDEX: 19.69 KG/M2 | WEIGHT: 100.8 LBS | SYSTOLIC BLOOD PRESSURE: 100 MMHG | HEART RATE: 70 BPM

## 2018-11-21 DIAGNOSIS — E04.1 THYROID NODULE, UNINODULAR: Primary | ICD-10-CM

## 2018-11-21 DIAGNOSIS — R00.2 HEART PALPITATIONS: Primary | ICD-10-CM

## 2018-11-21 PROCEDURE — 1036F TOBACCO NON-USER: CPT | Performed by: INTERNAL MEDICINE

## 2018-11-21 PROCEDURE — 99213 OFFICE O/P EST LOW 20 MIN: CPT | Performed by: INTERNAL MEDICINE

## 2018-11-21 PROCEDURE — G8484 FLU IMMUNIZE NO ADMIN: HCPCS | Performed by: INTERNAL MEDICINE

## 2018-11-21 PROCEDURE — G8427 DOCREV CUR MEDS BY ELIG CLIN: HCPCS | Performed by: INTERNAL MEDICINE

## 2018-11-21 PROCEDURE — G8420 CALC BMI NORM PARAMETERS: HCPCS | Performed by: INTERNAL MEDICINE

## 2018-11-21 PROCEDURE — 99213 OFFICE O/P EST LOW 20 MIN: CPT | Performed by: NURSE PRACTITIONER

## 2018-11-21 PROCEDURE — 93000 ELECTROCARDIOGRAM COMPLETE: CPT | Performed by: INTERNAL MEDICINE

## 2018-11-21 ASSESSMENT — ENCOUNTER SYMPTOMS
RHINORRHEA: 1
RESPIRATORY NEGATIVE: 1
EYES NEGATIVE: 1
DIARRHEA: 0
ALLERGIC/IMMUNOLOGIC NEGATIVE: 1
GASTROINTESTINAL NEGATIVE: 1
SORE THROAT: 0

## 2018-11-21 NOTE — PROGRESS NOTES
2018     Landen Hall (:  1995) is a 26 y.o. female, here for evaluation of the following medical concerns:    Chief Complaint   Patient presents with    Other     Lump in throat. pt states she felt this a few days ago        Pamela Cat was seen at a [facility] clinic yesterday for lump in her throat that she noticed two to three days ago. Pt denies pain, sore throat, voice changes, fever or chills, c/o sinus congestion two days ago. Review of Systems   Constitutional: Positive for activity change. Negative for appetite change, chills and fever. HENT: Positive for postnasal drip and rhinorrhea. Negative for sore throat, trouble swallowing and voice change. Congestion: sinus congestion two days ago. Pt c/o left sided neck lump with tightness    Eyes: Negative. Respiratory: Negative. Cardiovascular: Negative. Gastrointestinal: Negative. Negative for diarrhea. Endocrine: Negative. Negative for cold intolerance and heat intolerance. Genitourinary: Negative. Musculoskeletal: Negative. Skin: Negative. Allergic/Immunologic: Negative. Neurological: Negative. Hematological: Negative. Psychiatric/Behavioral: Negative. Prior to Visit Medications    Medication Sig Taking? Authorizing Provider   metoprolol tartrate (LOPRESSOR) 25 MG tablet Take 0.5 tablets by mouth daily Take half tab daily as needed Yes Humberto Parikh MD   drospirenone-ethinyl estradiol 3-0.03 MG TABS Take 1 tablet by mouth daily Yes Historical Provider, MD        Social History   Substance Use Topics    Smoking status: Never Smoker    Smokeless tobacco: Never Used    Alcohol use Yes      Comment: 4-5 times per month        Vitals:    18 1547   BP: 100/68   Resp: 16   Temp: 97.5 °F (36.4 °C)   Weight: 100 lb (45.4 kg)   Height: 5' (1.524 m)     Estimated body mass index is 19.53 kg/m² as calculated from the following:    Height as of this encounter: 5' (1.524 m).     Weight as of this encounter: 100 lb (45.4 kg). Physical Exam   Constitutional: She is oriented to person, place, and time. Vital signs are normal. She appears well-developed and well-nourished. She does not appear ill. HENT:   Head: Normocephalic and atraumatic. Right Ear: Hearing, tympanic membrane, external ear and ear canal normal.   Left Ear: Hearing, tympanic membrane, external ear and ear canal normal.   Nose: Rhinorrhea present. Mouth/Throat: Uvula is midline, oropharynx is clear and moist and mucous membranes are normal.   Noted left side    Eyes: Pupils are equal, round, and reactive to light. Conjunctivae, EOM and lids are normal.   Neck: Normal range of motion and full passive range of motion without pain. Neck supple. No tracheal tenderness present. No tracheal deviation present. Cardiovascular: Normal rate and regular rhythm. Pulmonary/Chest: Effort normal and breath sounds normal. No stridor. Abdominal: Soft. Normal appearance. Musculoskeletal: Normal range of motion. Lymphadenopathy:     Cervical adenopathy: possible deep cervical node swelling vs thyroid nodule. Neurological: She is alert and oriented to person, place, and time. Skin: Skin is warm and dry. Capillary refill takes less than 2 seconds. No rash noted. Psychiatric: Her speech is normal and behavior is normal. Judgment and thought content normal. Her mood appears anxious. Vitals reviewed. ASSESSMENT/PLAN:     1. Thyroid nodule, uninodular- Pt very anxious r/t swelling on left side of neck. Will order thyroid ultrasound. Last labs completed 8/3/18 are wnl. Orders:  - US THYROID; Future    Patient given educational handouts and has had all questions answered. Patient voices understanding and agrees to plans along with risks and benefits of plan. Patient agrees to follow up as instructed and sooner if needed. Patient agrees to go to ER if condition becomes emergent. No Follow-up on file.     An electronic

## 2018-11-23 ENCOUNTER — HOSPITAL ENCOUNTER (OUTPATIENT)
Dept: ULTRASOUND IMAGING | Age: 23
Discharge: HOME | End: 2018-11-23
Payer: COMMERCIAL

## 2018-11-23 DIAGNOSIS — E04.1 THYROID NODULE, UNINODULAR: ICD-10-CM

## 2018-11-23 PROCEDURE — 76536 US EXAM OF HEAD AND NECK: CPT

## 2018-11-24 ASSESSMENT — ENCOUNTER SYMPTOMS
RESPIRATORY NEGATIVE: 1
EYES NEGATIVE: 1
ALLERGIC/IMMUNOLOGIC NEGATIVE: 1
TROUBLE SWALLOWING: 0
VOICE CHANGE: 0
GASTROINTESTINAL NEGATIVE: 1

## 2018-11-26 ENCOUNTER — TELEPHONE (OUTPATIENT)
Dept: PRIMARY CARE CLINIC | Age: 23
End: 2018-11-26

## 2018-11-30 ENCOUNTER — TELEPHONE (OUTPATIENT)
Dept: PRIMARY CARE CLINIC | Age: 23
End: 2018-11-30

## 2018-11-30 DIAGNOSIS — J01.90 ACUTE SINUSITIS, RECURRENCE NOT SPECIFIED, UNSPECIFIED LOCATION: Primary | ICD-10-CM

## 2018-11-30 RX ORDER — AMOXICILLIN AND CLAVULANATE POTASSIUM 875; 125 MG/1; MG/1
1 TABLET, FILM COATED ORAL 2 TIMES DAILY
Qty: 20 TABLET | Refills: 0 | Status: SHIPPED | OUTPATIENT
Start: 2018-11-30 | End: 2018-12-10

## 2018-12-05 ENCOUNTER — OFFICE VISIT (OUTPATIENT)
Dept: PRIMARY CARE CLINIC | Age: 23
End: 2018-12-05
Payer: COMMERCIAL

## 2018-12-05 VITALS
BODY MASS INDEX: 19.76 KG/M2 | WEIGHT: 101.2 LBS | SYSTOLIC BLOOD PRESSURE: 102 MMHG | HEART RATE: 80 BPM | DIASTOLIC BLOOD PRESSURE: 76 MMHG | TEMPERATURE: 97.8 F

## 2018-12-05 DIAGNOSIS — R05.9 COUGH: Primary | ICD-10-CM

## 2018-12-05 PROCEDURE — 99213 OFFICE O/P EST LOW 20 MIN: CPT | Performed by: INTERNAL MEDICINE

## 2018-12-05 PROCEDURE — G8427 DOCREV CUR MEDS BY ELIG CLIN: HCPCS | Performed by: INTERNAL MEDICINE

## 2018-12-05 PROCEDURE — 1036F TOBACCO NON-USER: CPT | Performed by: INTERNAL MEDICINE

## 2018-12-05 PROCEDURE — G8420 CALC BMI NORM PARAMETERS: HCPCS | Performed by: INTERNAL MEDICINE

## 2018-12-05 PROCEDURE — G8484 FLU IMMUNIZE NO ADMIN: HCPCS | Performed by: INTERNAL MEDICINE

## 2018-12-20 ENCOUNTER — OFFICE VISIT (OUTPATIENT)
Dept: ENT CLINIC | Age: 23
End: 2018-12-20
Payer: COMMERCIAL

## 2018-12-20 VITALS
SYSTOLIC BLOOD PRESSURE: 120 MMHG | HEART RATE: 81 BPM | WEIGHT: 100 LBS | BODY MASS INDEX: 19.63 KG/M2 | DIASTOLIC BLOOD PRESSURE: 74 MMHG | HEIGHT: 60 IN

## 2018-12-20 DIAGNOSIS — M54.2 NECK PAIN: Primary | ICD-10-CM

## 2018-12-20 PROCEDURE — G8484 FLU IMMUNIZE NO ADMIN: HCPCS | Performed by: OTOLARYNGOLOGY

## 2018-12-20 PROCEDURE — G8427 DOCREV CUR MEDS BY ELIG CLIN: HCPCS | Performed by: OTOLARYNGOLOGY

## 2018-12-20 PROCEDURE — 99213 OFFICE O/P EST LOW 20 MIN: CPT | Performed by: OTOLARYNGOLOGY

## 2018-12-20 PROCEDURE — 1036F TOBACCO NON-USER: CPT | Performed by: OTOLARYNGOLOGY

## 2018-12-20 PROCEDURE — G8420 CALC BMI NORM PARAMETERS: HCPCS | Performed by: OTOLARYNGOLOGY

## 2018-12-20 ASSESSMENT — ENCOUNTER SYMPTOMS
CHOKING: 0
COUGH: 0
SORE THROAT: 0
PHOTOPHOBIA: 0
STRIDOR: 0
SHORTNESS OF BREATH: 0
NAUSEA: 0
SINUS PRESSURE: 0
VOICE CHANGE: 0
RHINORRHEA: 0
SINUS PAIN: 0
TROUBLE SWALLOWING: 0
DIARRHEA: 0
EYE ITCHING: 0
COLOR CHANGE: 0
FACIAL SWELLING: 0
EYE PAIN: 0
EYE REDNESS: 0

## 2018-12-20 NOTE — PROGRESS NOTES
Male      Comment: boyfriend     Other Topics Concern    Not on file     Social History Narrative    No narrative on file       Allergies     No Known Allergies    Medications     Current Outpatient Prescriptions   Medication Sig Dispense Refill    metoprolol tartrate (LOPRESSOR) 25 MG tablet Take 0.5 tablets by mouth daily Take half tab daily as needed 15 tablet 3    drospirenone-ethinyl estradiol 3-0.03 MG TABS Take 1 tablet by mouth daily       No current facility-administered medications for this visit. Review of Systems     Review of Systems   Constitutional: Negative for chills, fatigue and fever. HENT: Negative for congestion, ear discharge, ear pain, facial swelling, hearing loss, nosebleeds, postnasal drip, rhinorrhea, sinus pain, sinus pressure, sneezing, sore throat, tinnitus, trouble swallowing and voice change. Eyes: Negative for photophobia, pain, redness, itching and visual disturbance. Respiratory: Negative for cough, choking, shortness of breath and stridor. Gastrointestinal: Negative for diarrhea and nausea. Musculoskeletal: Negative for neck pain and neck stiffness. Skin: Negative for color change and rash. Neurological: Negative for dizziness, facial asymmetry and light-headedness. Hematological: Negative for adenopathy. Psychiatric/Behavioral: Negative for agitation and confusion. PhysicalExam     Vitals:    12/20/18 1436   BP: 120/74   Pulse: 81       Physical Exam   Constitutional: She is oriented to person, place, and time. She appears well-developed and well-nourished. HENT:   Head: Normocephalic and atraumatic. Right Ear: Tympanic membrane, external ear and ear canal normal. No drainage. Tympanic membrane is not perforated. No middle ear effusion. Left Ear: Tympanic membrane, external ear and ear canal normal. No drainage. Tympanic membrane is not perforated. No middle ear effusion. Nose: No mucosal edema, rhinorrhea or septal deviation.  No epistaxis. Mouth/Throat: Uvula is midline, oropharynx is clear and moist and mucous membranes are normal. No trismus in the jaw. Normal dentition. No oropharyngeal exudate. Eyes: Pupils are equal, round, and reactive to light. EOM are normal. Right eye exhibits no discharge. Left eye exhibits no discharge. No scleral icterus. Neck: Phonation normal. Neck supple. No tracheal deviation present. No thyromegaly present. Pulmonary/Chest: Effort normal. No stridor. No respiratory distress. Lymphadenopathy:     She has no cervical adenopathy. Neurological: She is alert and oriented to person, place, and time. No cranial nerve deficit. Skin: Skin is warm and dry. Psychiatric: She has a normal mood and affect. Her behavior is normal.         Procedure           Assessment and Plan     1. Neck pain  There is no evidence of concerning adenopathy or masses on examination today. Ultrasound examination is normal.  She does exhibit some point tenderness on the greater cornu of the left hyoid bone. She may have some inflammation of the stylohyoid ligament. I instructed her to take ibuprofen 400 mg 2-3 times a day for a week to see if this helps his symptoms. I do not think further workup is necessary. She has any other questions or concerns she may call. She is in agreement with this plan. Return if symptoms worsen or fail to improve.

## 2019-01-11 ENCOUNTER — OFFICE VISIT (OUTPATIENT)
Dept: PRIMARY CARE CLINIC | Age: 24
End: 2019-01-11
Payer: COMMERCIAL

## 2019-01-11 VITALS
TEMPERATURE: 97.8 F | DIASTOLIC BLOOD PRESSURE: 70 MMHG | WEIGHT: 103.4 LBS | SYSTOLIC BLOOD PRESSURE: 100 MMHG | HEART RATE: 66 BPM | BODY MASS INDEX: 20.19 KG/M2

## 2019-01-11 DIAGNOSIS — R09.89 CHEST CONGESTION: Primary | ICD-10-CM

## 2019-01-11 DIAGNOSIS — J02.9 SORE THROAT: ICD-10-CM

## 2019-01-11 LAB — S PYO AG THROAT QL: NORMAL

## 2019-01-11 PROCEDURE — 87880 STREP A ASSAY W/OPTIC: CPT | Performed by: NURSE PRACTITIONER

## 2019-01-11 PROCEDURE — 99213 OFFICE O/P EST LOW 20 MIN: CPT | Performed by: NURSE PRACTITIONER

## 2019-01-11 RX ORDER — FLUCONAZOLE 150 MG/1
TABLET ORAL
Refills: 1 | COMMUNITY
Start: 2018-12-13 | End: 2019-01-11 | Stop reason: ALTCHOICE

## 2019-01-13 ASSESSMENT — ENCOUNTER SYMPTOMS
CHEST TIGHTNESS: 1
COUGH: 1
SORE THROAT: 1
GASTROINTESTINAL NEGATIVE: 1
EYES NEGATIVE: 1
ALLERGIC/IMMUNOLOGIC NEGATIVE: 1
RHINORRHEA: 1

## 2019-01-16 ENCOUNTER — TELEPHONE (OUTPATIENT)
Dept: PRIMARY CARE CLINIC | Age: 24
End: 2019-01-16

## 2019-01-16 RX ORDER — AZITHROMYCIN 250 MG/1
TABLET, FILM COATED ORAL
Qty: 1 PACKET | Refills: 0 | Status: SHIPPED | OUTPATIENT
Start: 2019-01-16 | End: 2019-07-31

## 2019-01-30 ENCOUNTER — HOSPITAL ENCOUNTER (OUTPATIENT)
Dept: GENERAL RADIOLOGY | Age: 24
Discharge: HOME | End: 2019-01-30
Payer: COMMERCIAL

## 2019-01-30 ENCOUNTER — HOSPITAL ENCOUNTER (OUTPATIENT)
Age: 24
Discharge: HOME | End: 2019-01-30
Payer: COMMERCIAL

## 2019-01-30 DIAGNOSIS — R09.89 CHEST CONGESTION: ICD-10-CM

## 2019-01-30 PROCEDURE — 71046 X-RAY EXAM CHEST 2 VIEWS: CPT

## 2019-01-31 ENCOUNTER — TELEPHONE (OUTPATIENT)
Dept: PRIMARY CARE CLINIC | Age: 24
End: 2019-01-31

## 2019-01-31 DIAGNOSIS — R06.02 SOB (SHORTNESS OF BREATH) ON EXERTION: Primary | ICD-10-CM

## 2019-03-07 ENCOUNTER — TELEPHONE (OUTPATIENT)
Dept: PULMONOLOGY | Age: 24
End: 2019-03-07

## 2019-03-07 DIAGNOSIS — R06.02 SHORTNESS OF BREATH: Primary | ICD-10-CM

## 2019-04-01 ENCOUNTER — OFFICE VISIT (OUTPATIENT)
Dept: PULMONOLOGY | Age: 24
End: 2019-04-01
Payer: COMMERCIAL

## 2019-04-01 VITALS
HEIGHT: 60 IN | WEIGHT: 103 LBS | OXYGEN SATURATION: 100 % | BODY MASS INDEX: 20.22 KG/M2 | HEART RATE: 99 BPM | SYSTOLIC BLOOD PRESSURE: 100 MMHG | DIASTOLIC BLOOD PRESSURE: 70 MMHG

## 2019-04-01 DIAGNOSIS — F41.9 ANXIETY: ICD-10-CM

## 2019-04-01 DIAGNOSIS — R06.00 DYSPNEA, UNSPECIFIED TYPE: Primary | ICD-10-CM

## 2019-04-01 DIAGNOSIS — R00.2 PALPITATIONS: ICD-10-CM

## 2019-04-01 PROCEDURE — 99203 OFFICE O/P NEW LOW 30 MIN: CPT | Performed by: INTERNAL MEDICINE

## 2019-04-01 ASSESSMENT — ENCOUNTER SYMPTOMS
WHEEZING: 0
ABDOMINAL DISTENTION: 0
EYE REDNESS: 0
SHORTNESS OF BREATH: 1
COUGH: 0
EYE ITCHING: 1

## 2019-04-01 NOTE — PROGRESS NOTES
[facility]    Outpatient Note    Subjective:   CHIEF COMPLAINT:   Chief Complaint   Patient presents with    New Patient    Asthma       HPI:     The patient is 26 y.o. female who presents today for a new patient visit for evaluation of SOB. It started few months ago with exertion, but now happening even at rest.  It is associated with dizziness, and nausea with tachycardia, which at fist got better with BB, but now start having similar or worse symptoms. She is seen by cardiologyand use metoprolol 12.5 mg on PRN bases but over the past few days she is using it daily. Mother had asthma, and she is wondering if she has it however she was seen by an allergist and had spirometry that was not consistent with asthma. There is no hx of wheezing. She was also seen by ENT few months ago and was told she had deviated septum. She has some cough and post nasal drip and recurrent sinus infection. She has congestion, and runny nose with itchy eyes. She also has headache, recurrent, much more recently. It is usually on the left side and throbbing, it is associated with some  Photophobia. Sometimes need to be in dark quiet room. She is on birth control for two years. She also has chest pain, last for few minutes, sometimes with tenderness.       Past Medical History:    Past Medical History:   Diagnosis Date    Anxiety 3/1/2018    Tachycardia 01/28/208    [facility]. has been checked out by cardiology and everything was normal       Social History:    Social History     Tobacco Use   Smoking Status Never Smoker   Smokeless Tobacco Never Used       Family History:  Family History   Problem Relation Age of Onset    High Blood Pressure Father     Breast Cancer Maternal Aunt        Current Medications:  Current Outpatient Medications on File Prior to Visit   Medication Sig Dispense Refill    metoprolol tartrate (LOPRESSOR) 25 MG tablet TAKE 0.5 TABLETS BY MOUTH DAILY TAKE HALF TAB DAILY AS NEEDED 45 tablet 6    drospirenone-ethinyl estradiol 3-0.03 MG TABS Take 1 tablet by mouth daily      azithromycin (ZITHROMAX) 250 MG tablet Take 2 tablets on day 1 and 1 tablet day 2-5 1 packet 0     No current facility-administered medications on file prior to visit. REVIEW OF SYSTEMS:    Review of Systems   Constitutional: Positive for fatigue. Negative for chills and fever. HENT: Positive for congestion and postnasal drip. Eyes: Positive for itching. Negative for redness. Respiratory: Positive for shortness of breath. Negative for cough and wheezing. Cardiovascular: Positive for chest pain and palpitations. Negative for leg swelling. Gastrointestinal: Negative for abdominal distention. Neurological: Negative for weakness. Psychiatric/Behavioral: The patient is nervous/anxious. All other systems reviewed and are negative. Objective:   PHYSICAL EXAM:        VITALS:  /70 (Site: Right Upper Arm, Position: Sitting, Cuff Size: Small Adult)   Pulse 99   Ht 5' (1.524 m)   Wt 103 lb (46.7 kg)   SpO2 100%   Breastfeeding? No   BMI 20.12 kg/m²     Physical Exam   Constitutional: She is oriented to person, place, and time. She appears well-developed and well-nourished. HENT:   Head: Normocephalic and atraumatic. Nose: Nose normal.   Mouth/Throat: Oropharynx is clear and moist.   Eyes: Pupils are equal, round, and reactive to light. EOM are normal.   Neck: Neck supple. No JVD present. Cardiovascular: Normal rate and regular rhythm. Exam reveals no gallop and no friction rub. No murmur heard. Pulmonary/Chest: Effort normal and breath sounds normal. No stridor. No respiratory distress. She has no wheezes. She has no rales. Abdominal: Soft. Bowel sounds are normal. She exhibits no distension. There is no tenderness. Musculoskeletal: She exhibits no edema or deformity.    Neurological: She is alert and oriented to person, place, and time.   Skin: Skin is warm and dry. Psychiatric: She has a normal mood and affect. Her behavior is normal.   Vitals reviewed. DATA:    Chest x-ray on 1/30/19     No active cardiopulmonary disease. Echo on 3/16/18  Normal left ventricle size, wall thickness, and systolic function with an   estimated ejection fraction of 55-60%.   No regional wall motion abnormalities are seen. Normal diastolic function.   Trivial tricuspid regurgitation.   Estimated pulmonary artery systolic pressure is normal at 27 mmHg assuming a   right atrial pressure of 3 mmHg. Assessment:      Diagnosis Orders   1. Dyspnea, unspecified type     2. Palpitations     3. Anxiety         Plan:   26years old female is here for evaluation of shortness of breath associated with fatigue, dizziness, lightheadedness, chest pain and palpitations. She also has migraine-type headache. She describes herself as anxious, and hopes for anxiety will get better after graduation. Recent spirometry with FEV1/FVC ratio of 87%, with FEV1 of 83 percent predicted. With no significant change postbronchodilator. Chest x-ray was reviewed and within normal  Echo is within normal  TSH was checked twice before and within normal  Nasal mucosa and oropharynx looks normal.    History is consistent with anxiety, and migraine-type headache that may also contribute to her nasal symptoms. She was reassured that there is no longer pathology, and her symptoms are likely due to anxiety and migraine headache   She is on birth control pills, the history is not suggestive of pulmonary embolism. D-dimer was checked before and was low. I believe doing more tests will be of more harm than benefit. Hopefully with reassurance and the upcoming graduation she will start feeling better. Follow-up p.r.n.

## 2019-07-31 ENCOUNTER — OFFICE VISIT (OUTPATIENT)
Dept: INTERNAL MEDICINE CLINIC | Age: 24
End: 2019-07-31
Payer: COMMERCIAL

## 2019-07-31 VITALS
RESPIRATION RATE: 16 BRPM | BODY MASS INDEX: 19.96 KG/M2 | HEART RATE: 86 BPM | DIASTOLIC BLOOD PRESSURE: 64 MMHG | SYSTOLIC BLOOD PRESSURE: 106 MMHG | TEMPERATURE: 98.2 F | OXYGEN SATURATION: 99 % | HEIGHT: 59 IN | WEIGHT: 99 LBS

## 2019-07-31 DIAGNOSIS — R94.31 SHORTENED PR INTERVAL: ICD-10-CM

## 2019-07-31 DIAGNOSIS — E53.8 LOW SERUM VITAMIN B12: ICD-10-CM

## 2019-07-31 DIAGNOSIS — R53.83 FATIGUE, UNSPECIFIED TYPE: ICD-10-CM

## 2019-07-31 DIAGNOSIS — J01.90 SUBACUTE SINUSITIS, UNSPECIFIED LOCATION: Primary | ICD-10-CM

## 2019-07-31 PROCEDURE — 99214 OFFICE O/P EST MOD 30 MIN: CPT | Performed by: INTERNAL MEDICINE

## 2019-07-31 SDOH — HEALTH STABILITY: MENTAL HEALTH: HOW OFTEN DO YOU HAVE A DRINK CONTAINING ALCOHOL?: 2-4 TIMES A MONTH

## 2019-07-31 SDOH — HEALTH STABILITY: MENTAL HEALTH: HOW MANY STANDARD DRINKS CONTAINING ALCOHOL DO YOU HAVE ON A TYPICAL DAY?: 1 OR 2

## 2019-07-31 ASSESSMENT — PATIENT HEALTH QUESTIONNAIRE - PHQ9
SUM OF ALL RESPONSES TO PHQ9 QUESTIONS 1 & 2: 0
1. LITTLE INTEREST OR PLEASURE IN DOING THINGS: 0
SUM OF ALL RESPONSES TO PHQ QUESTIONS 1-9: 0
2. FEELING DOWN, DEPRESSED OR HOPELESS: 0
SUM OF ALL RESPONSES TO PHQ QUESTIONS 1-9: 0

## 2019-08-01 ASSESSMENT — ENCOUNTER SYMPTOMS
VOICE CHANGE: 1
NAUSEA: 0
SHORTNESS OF BREATH: 0
COUGH: 0
ABDOMINAL PAIN: 0
DIARRHEA: 0

## 2019-08-02 DIAGNOSIS — E53.8 LOW SERUM VITAMIN B12: Primary | ICD-10-CM

## 2019-08-02 RX ORDER — CYANOCOBALAMIN (VITAMIN B-12) 1000 MCG
1000 TABLET ORAL DAILY
Qty: 30 TABLET | Refills: 3 | COMMUNITY
Start: 2019-08-02 | End: 2021-04-05 | Stop reason: ALTCHOICE

## 2019-08-05 ENCOUNTER — OFFICE VISIT (OUTPATIENT)
Dept: CARDIOLOGY CLINIC | Age: 24
End: 2019-08-05
Payer: COMMERCIAL

## 2019-08-05 VITALS
WEIGHT: 99 LBS | HEART RATE: 80 BPM | BODY MASS INDEX: 20 KG/M2 | DIASTOLIC BLOOD PRESSURE: 70 MMHG | SYSTOLIC BLOOD PRESSURE: 110 MMHG

## 2019-08-05 DIAGNOSIS — R00.2 HEART PALPITATIONS: Primary | ICD-10-CM

## 2019-08-05 PROCEDURE — 93000 ELECTROCARDIOGRAM COMPLETE: CPT | Performed by: INTERNAL MEDICINE

## 2019-08-05 PROCEDURE — 99213 OFFICE O/P EST LOW 20 MIN: CPT | Performed by: INTERNAL MEDICINE

## 2019-08-05 NOTE — PROGRESS NOTES
Subjective:      Patient ID: Fernando Cohen is a 22 y.o. female. CC:  Palpitations, Dizziness. Palpitations      Patient under a lot of stress with school. Has anxiety and stopped paxil.  palps are under better controlled with metoprolol daily and then prn in the evening. She is very weary of \"depending' on medication. Review of Systems   Constitutional: Negative. HENT: Negative. Eyes: Negative. Respiratory: Negative. Cardiovascular: Positive for palpitations. Gastrointestinal: Negative. Endocrine: Negative. Genitourinary: Negative. Musculoskeletal: Negative. Skin: Negative. Allergic/Immunologic: Negative. Neurological: Negative. Hematological: Negative. Psychiatric/Behavioral: Negative. Vitals:    10/03/18 0955   BP: 110/60   Pulse: 82     Objective:   Physical Exam   Constitutional: She is oriented to person, place, and time. She appears well-developed and well-nourished. HENT:   Head: Normocephalic and atraumatic. Eyes: Pupils are equal, round, and reactive to light. EOM are normal.   Neck: Normal range of motion. Neck supple. Cardiovascular: Normal rate and regular rhythm. Exam reveals no friction rub. Murmur heard. Pulmonary/Chest: Effort normal and breath sounds normal.   Abdominal: Soft. Bowel sounds are normal.   Musculoskeletal: She exhibits no edema or deformity. Neurological: She is alert and oriented to person, place, and time. No cranial nerve deficit. Skin: Skin is warm and dry. Psychiatric: She has a normal mood and affect. Her behavior is normal. Judgment and thought content normal.          Prior to Visit Medications    Medication Sig Taking?  Authorizing Provider   Cyanocobalamin (VITAMIN B-12) 1000 MCG TABS Take 1,000 mcg by mouth daily Yes Anuel Ramirez MD   metoprolol tartrate (LOPRESSOR) 25 MG tablet TAKE 0.5 TABLETS BY MOUTH DAILY TAKE HALF TAB DAILY AS NEEDED Yes Sonia Cueto MD   drospirenone-ethinyl estradiol 3-0.03 MG TABS Take 1 tablet by mouth daily Yes Historical Provider, MD     FH:  No FH CAD. 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 Lives with parents. Assessment:      Patient Active Problem List   Diagnosis    Anxiety    Heart palpitations    HPV (human papilloma virus) anogenital infection           Plan:      Palpitations:  LVEF was normal on echo with normal right heart. She is concerned about high hR. She is taking 25 mg lopressor 1/2 tablet q am and is taking it. She can take it in the PM if needed    Dizziness:  May not be related to palpitations. She is concerned about short MT syndrome. Will consult EP to assess this patient. she may need a TTT.

## 2019-08-06 ENCOUNTER — OFFICE VISIT (OUTPATIENT)
Dept: INTERNAL MEDICINE CLINIC | Age: 24
End: 2019-08-06
Payer: COMMERCIAL

## 2019-08-06 VITALS
TEMPERATURE: 97.6 F | OXYGEN SATURATION: 98 % | RESPIRATION RATE: 16 BRPM | WEIGHT: 99 LBS | DIASTOLIC BLOOD PRESSURE: 62 MMHG | SYSTOLIC BLOOD PRESSURE: 108 MMHG | HEART RATE: 78 BPM | BODY MASS INDEX: 20 KG/M2

## 2019-08-06 DIAGNOSIS — E53.8 LOW VITAMIN B12 LEVEL: ICD-10-CM

## 2019-08-06 DIAGNOSIS — H69.80 DYSFUNCTION OF EUSTACHIAN TUBE, UNSPECIFIED LATERALITY: ICD-10-CM

## 2019-08-06 DIAGNOSIS — J02.9 ACUTE PHARYNGITIS, UNSPECIFIED ETIOLOGY: Primary | ICD-10-CM

## 2019-08-06 PROCEDURE — 99213 OFFICE O/P EST LOW 20 MIN: CPT | Performed by: INTERNAL MEDICINE

## 2019-08-06 RX ORDER — FLUTICASONE PROPIONATE 50 MCG
2 SPRAY, SUSPENSION (ML) NASAL DAILY
Qty: 1 BOTTLE | Refills: 11 | COMMUNITY
Start: 2019-08-06 | End: 2019-10-01

## 2019-08-06 ASSESSMENT — ENCOUNTER SYMPTOMS
SHORTNESS OF BREATH: 0
SORE THROAT: 1
VOICE CHANGE: 1
SINUS PAIN: 0
TROUBLE SWALLOWING: 1

## 2019-08-08 LAB — THROAT CULTURE: NORMAL

## 2019-08-26 NOTE — PROGRESS NOTES
Allergies    Medication:   Prior to Admission medications    Medication Sig Start Date End Date Taking? Authorizing Provider   Cyanocobalamin (VITAMIN B-12) 1000 MCG TABS Take 1,000 mcg by mouth daily 8/2/19 8/1/20 Yes Bean Katz MD   metoprolol tartrate (LOPRESSOR) 25 MG tablet TAKE 0.5 TABLETS BY MOUTH DAILY TAKE HALF TAB DAILY AS NEEDED 2/11/19  Yes Kyleigh Lange MD   drospirenone-ethinyl estradiol 3-0.03 MG TABS Take 1 tablet by mouth daily   Yes Historical Provider, MD   fluticasone (FLONASE) 50 MCG/ACT nasal spray 2 sprays by Nasal route daily  Patient not taking: Reported on 8/29/2019 8/6/19   Bean Katz MD       Social History:   reports that she has never smoked. She has never used smokeless tobacco. She reports that she drinks alcohol. She reports that she does not use drugs. Family History:  family history includes Breast Cancer in her maternal aunt; Diabetes type 2  in her maternal grandmother; High Blood Pressure in her father. Reviewed. Denies family history of sudden cardiac death, arrhythmia, premature CAD    Review of System:    · General ROS: negative for - chills, fever   · Psychological ROS: negative for - anxiety or depression  · Ophthalmic ROS: negative for - eye pain or loss of vision  · ENT ROS: negative for - epistaxis, headaches, nasal discharge, sore throat   · Allergy and Immunology ROS: negative for - hives, nasal congestion   · Hematological and Lymphatic ROS: negative for - bleeding problems, blood clots, bruising or jaundice  · Endocrine ROS: negative for - skin changes, temperature intolerance or unexpected weight changes  · Respiratory ROS: negative for - cough, hemoptysis, pleuritic pain, SOB, sputum changes or wheezing  · Cardiovascular ROS: Per HPI.    · Gastrointestinal ROS: negative for - abdominal pain, blood in stools, diarrhea, hematemesis, melena, nausea/vomiting or swallowing difficulty/pain  · Genito-Urinary ROS: negative for - dysuria or incontinence  · Musculoskeletal ROS: negative for - joint swelling or muscle pain  · Neurological ROS: negative for - confusion, dizziness, gait disturbance, headaches, numbness/tingling, seizures, speech problems, tremors, visual changes or weakness  · Dermatological ROS: negative for - rash    Physical Examination:  Vitals:    08/29/19 1049   BP: 108/72   Pulse: 75     · Constitutional: Oriented. No distress. · Head: Normocephalic and atraumatic. · Mouth/Throat: Oropharynx is clear and moist.   · Eyes: Conjunctivae normal. EOM are normal.   · Neck: Normal range of motion. Neck supple. No rigidity. No JVD present. · Cardiovascular: Normal rate, regular rhythm, S1&S2 and intact distal pulses. · Pulmonary/Chest: Bilateral respiratory sounds. No wheezes. No rhonchi. · Abdominal: Soft. Bowel sounds present. No distension, No tenderness. · Musculoskeletal: No tenderness. No edema    · Lymphadenopathy: Has no cervical adenopathy. · Neurological: Alert and oriented. Cranial nerve appears intact, No Gross deficit   · Skin: Skin is warm and dry. No rash noted. · Psychiatric: Has a normal mood, affect and behavior     Labs:  Reviewed. ECG: reviewed, Sinus Rhythm, with QRS duration 78 ms. No pathologic Q waves, ventricular pre-excitation, or QT prolongation. Studies:   1. Event monitor:  none    2. Echo 3/16/18:   Summary   Normal left ventricle size, wall thickness, and systolic function with an   estimated ejection fraction of 55-60%.   No regional wall motion abnormalities are seen. Normal diastolic function.   Trivial tricuspid regurgitation.   Estimated pulmonary artery systolic pressure is normal at 27 mmHg assuming a   right atrial pressure of 3 mmHg. 3. Stress Test:  none    4. Cath: none    The MCOT, echocardiogram, stress test, and coronary angiography/PCI were reviewed by myself and used for my plan of care. Procedures:  1. Tilt table study    Assessment/Plan:  1.

## 2019-08-29 ENCOUNTER — PREP FOR PROCEDURE (OUTPATIENT)
Dept: CARDIOLOGY CLINIC | Age: 24
End: 2019-08-29

## 2019-08-29 ENCOUNTER — OFFICE VISIT (OUTPATIENT)
Dept: CARDIOLOGY CLINIC | Age: 24
End: 2019-08-29
Payer: COMMERCIAL

## 2019-08-29 VITALS
BODY MASS INDEX: 19.12 KG/M2 | DIASTOLIC BLOOD PRESSURE: 72 MMHG | HEIGHT: 60 IN | WEIGHT: 97.4 LBS | SYSTOLIC BLOOD PRESSURE: 108 MMHG | HEART RATE: 75 BPM

## 2019-08-29 DIAGNOSIS — R00.0 TACHYCARDIA: ICD-10-CM

## 2019-08-29 DIAGNOSIS — R06.02 SOB (SHORTNESS OF BREATH): ICD-10-CM

## 2019-08-29 DIAGNOSIS — R42 DIZZINESS: ICD-10-CM

## 2019-08-29 DIAGNOSIS — R00.2 HEART PALPITATIONS: Primary | ICD-10-CM

## 2019-08-29 PROCEDURE — 99205 OFFICE O/P NEW HI 60 MIN: CPT | Performed by: INTERNAL MEDICINE

## 2019-08-29 PROCEDURE — 93000 ELECTROCARDIOGRAM COMPLETE: CPT | Performed by: INTERNAL MEDICINE

## 2019-08-29 RX ORDER — SODIUM CHLORIDE 0.9 % (FLUSH) 0.9 %
10 SYRINGE (ML) INJECTION EVERY 12 HOURS SCHEDULED
Status: CANCELLED | OUTPATIENT
Start: 2019-08-29

## 2019-08-29 RX ORDER — SODIUM CHLORIDE 9 MG/ML
INJECTION, SOLUTION INTRAVENOUS CONTINUOUS
Status: CANCELLED | OUTPATIENT
Start: 2019-08-29

## 2019-08-29 RX ORDER — SODIUM CHLORIDE 0.9 % (FLUSH) 0.9 %
10 SYRINGE (ML) INJECTION PRN
Status: CANCELLED | OUTPATIENT
Start: 2019-08-29

## 2019-08-30 ENCOUNTER — TELEPHONE (OUTPATIENT)
Dept: CARDIOLOGY CLINIC | Age: 24
End: 2019-08-30

## 2019-09-23 PROCEDURE — 93272 ECG/REVIEW INTERPRET ONLY: CPT | Performed by: INTERNAL MEDICINE

## 2019-09-24 DIAGNOSIS — R00.2 PALPITATION: ICD-10-CM

## 2019-09-30 ENCOUNTER — TELEPHONE (OUTPATIENT)
Dept: CARDIOLOGY CLINIC | Age: 24
End: 2019-09-30

## 2019-10-01 ENCOUNTER — HOSPITAL ENCOUNTER (OUTPATIENT)
Dept: CARDIAC CATH/INVASIVE PROCEDURES | Age: 24
Discharge: HOME | End: 2019-10-03
Payer: COMMERCIAL

## 2019-10-01 ENCOUNTER — HOSPITAL ENCOUNTER (OUTPATIENT)
Dept: NON INVASIVE DIAGNOSTICS | Age: 24
Discharge: HOME | End: 2019-10-01
Payer: COMMERCIAL

## 2019-10-01 VITALS — BODY MASS INDEX: 19.24 KG/M2 | HEIGHT: 60 IN | TEMPERATURE: 97.9 F | WEIGHT: 98 LBS

## 2019-10-01 DIAGNOSIS — R42 DIZZINESS: ICD-10-CM

## 2019-10-01 DIAGNOSIS — R00.2 HEART PALPITATIONS: ICD-10-CM

## 2019-10-01 DIAGNOSIS — R00.0 TACHYCARDIA: ICD-10-CM

## 2019-10-01 DIAGNOSIS — R06.02 SOB (SHORTNESS OF BREATH): ICD-10-CM

## 2019-10-01 LAB
EKG ATRIAL RATE: 82 BPM
EKG DIAGNOSIS: NORMAL
EKG P AXIS: 24 DEGREES
EKG P-R INTERVAL: 104 MS
EKG Q-T INTERVAL: 370 MS
EKG QRS DURATION: 68 MS
EKG QTC CALCULATION (BAZETT): 432 MS
EKG R AXIS: 58 DEGREES
EKG T AXIS: 17 DEGREES
EKG VENTRICULAR RATE: 82 BPM
LV EF: 55 %
LVEF MODALITY: NORMAL

## 2019-10-01 PROCEDURE — 93005 ELECTROCARDIOGRAM TRACING: CPT | Performed by: INTERNAL MEDICINE

## 2019-10-01 PROCEDURE — 93660 TILT TABLE EVALUATION: CPT

## 2019-10-01 PROCEDURE — 93017 CV STRESS TEST TRACING ONLY: CPT

## 2019-10-01 PROCEDURE — 93350 STRESS TTE ONLY: CPT

## 2019-10-01 PROCEDURE — 93010 ELECTROCARDIOGRAM REPORT: CPT | Performed by: INTERNAL MEDICINE

## 2019-10-01 RX ORDER — ETONOGESTREL 68 MG/1
IMPLANT SUBCUTANEOUS
COMMUNITY
Start: 2019-09-16

## 2019-10-01 RX ORDER — SODIUM CHLORIDE 0.9 % (FLUSH) 0.9 %
10 SYRINGE (ML) INJECTION PRN
Status: DISCONTINUED | OUTPATIENT
Start: 2019-10-01 | End: 2019-10-04 | Stop reason: HOSPADM

## 2019-10-01 RX ORDER — SODIUM CHLORIDE 9 MG/ML
INJECTION, SOLUTION INTRAVENOUS CONTINUOUS
Status: DISCONTINUED | OUTPATIENT
Start: 2019-10-01 | End: 2019-10-04 | Stop reason: HOSPADM

## 2019-10-01 RX ORDER — SODIUM CHLORIDE 0.9 % (FLUSH) 0.9 %
10 SYRINGE (ML) INJECTION EVERY 12 HOURS SCHEDULED
Status: DISCONTINUED | OUTPATIENT
Start: 2019-10-01 | End: 2019-10-04 | Stop reason: HOSPADM

## 2019-10-01 ASSESSMENT — PAIN DESCRIPTION - FREQUENCY: FREQUENCY: CONTINUOUS

## 2019-10-01 ASSESSMENT — PAIN DESCRIPTION - ONSET: ONSET: ON-GOING

## 2019-10-01 ASSESSMENT — PAIN DESCRIPTION - ORIENTATION: ORIENTATION: MID

## 2019-10-01 ASSESSMENT — PAIN DESCRIPTION - PAIN TYPE: TYPE: ACUTE PAIN

## 2019-10-01 ASSESSMENT — PAIN DESCRIPTION - PROGRESSION: CLINICAL_PROGRESSION: GRADUALLY IMPROVING

## 2019-10-01 ASSESSMENT — PAIN DESCRIPTION - LOCATION: LOCATION: HEAD

## 2019-10-01 ASSESSMENT — PAIN DESCRIPTION - DESCRIPTORS: DESCRIPTORS: ACHING

## 2019-10-01 ASSESSMENT — PAIN SCALES - GENERAL: PAINLEVEL_OUTOF10: 4

## 2019-10-01 ASSESSMENT — PAIN - FUNCTIONAL ASSESSMENT: PAIN_FUNCTIONAL_ASSESSMENT: ACTIVITIES ARE NOT PREVENTED

## 2019-10-03 PROCEDURE — 93660 TILT TABLE EVALUATION: CPT | Performed by: INTERNAL MEDICINE

## 2019-10-07 ENCOUNTER — TELEPHONE (OUTPATIENT)
Dept: CARDIOLOGY CLINIC | Age: 24
End: 2019-10-07

## 2019-10-31 ENCOUNTER — OFFICE VISIT (OUTPATIENT)
Dept: INTERNAL MEDICINE CLINIC | Age: 24
End: 2019-10-31
Payer: COMMERCIAL

## 2019-10-31 VITALS
WEIGHT: 97 LBS | DIASTOLIC BLOOD PRESSURE: 60 MMHG | BODY MASS INDEX: 18.94 KG/M2 | HEART RATE: 80 BPM | OXYGEN SATURATION: 98 % | SYSTOLIC BLOOD PRESSURE: 94 MMHG | TEMPERATURE: 98.2 F | RESPIRATION RATE: 16 BRPM

## 2019-10-31 DIAGNOSIS — E53.8 B12 DEFICIENCY: Primary | ICD-10-CM

## 2019-10-31 DIAGNOSIS — Z11.4 SCREENING FOR HIV (HUMAN IMMUNODEFICIENCY VIRUS): ICD-10-CM

## 2019-10-31 DIAGNOSIS — J31.0 NON-ALLERGIC RHINITIS: ICD-10-CM

## 2019-10-31 DIAGNOSIS — M41.9 SCOLIOSIS OF THORACOLUMBAR SPINE, UNSPECIFIED SCOLIOSIS TYPE: ICD-10-CM

## 2019-10-31 DIAGNOSIS — R00.0 TACHYCARDIA: ICD-10-CM

## 2019-10-31 PROCEDURE — 99214 OFFICE O/P EST MOD 30 MIN: CPT | Performed by: INTERNAL MEDICINE

## 2020-03-09 NOTE — TELEPHONE ENCOUNTER
Requested Prescriptions     Pending Prescriptions Disp Refills    metoprolol tartrate (LOPRESSOR) 25 MG tablet [Pharmacy Med Name: METOPROLOL TARTRATE 25 MG TAB] 90 tablet 2     Sig: TAKE 0.5 TABLETS BY MOUTH DAILY TAKE HALF TAB DAILY AS NEEDED       Last Office Visit: 8/29/19    Next Office Visit: 8/7/20

## 2020-05-20 ENCOUNTER — VIRTUAL VISIT (OUTPATIENT)
Dept: FAMILY MEDICINE CLINIC | Age: 25
End: 2020-05-20
Payer: COMMERCIAL

## 2020-05-20 DIAGNOSIS — R10.84 GENERALIZED ABDOMINAL PAIN: ICD-10-CM

## 2020-05-20 DIAGNOSIS — E53.8 B12 DEFICIENCY: ICD-10-CM

## 2020-05-20 LAB
A/G RATIO: 1.7 (ref 1.1–2.2)
ALBUMIN SERPL-MCNC: 4.8 G/DL (ref 3.4–5)
ALP BLD-CCNC: 76 U/L (ref 40–129)
ALT SERPL-CCNC: 13 U/L (ref 10–40)
ANION GAP SERPL CALCULATED.3IONS-SCNC: 14 MMOL/L (ref 3–16)
AST SERPL-CCNC: 17 U/L (ref 15–37)
BASOPHILS ABSOLUTE: 0.1 K/UL (ref 0–0.2)
BASOPHILS RELATIVE PERCENT: 0.9 %
BILIRUB SERPL-MCNC: 1.1 MG/DL (ref 0–1)
BILIRUBIN URINE: NEGATIVE
BLOOD, URINE: NEGATIVE
BUN BLDV-MCNC: 10 MG/DL (ref 7–20)
CALCIUM SERPL-MCNC: 9.5 MG/DL (ref 8.3–10.6)
CHLORIDE BLD-SCNC: 103 MMOL/L (ref 99–110)
CLARITY: CLEAR
CO2: 24 MMOL/L (ref 21–32)
COLOR: YELLOW
CREAT SERPL-MCNC: <0.5 MG/DL (ref 0.6–1.1)
EOSINOPHILS ABSOLUTE: 0.3 K/UL (ref 0–0.6)
EOSINOPHILS RELATIVE PERCENT: 5 %
GFR AFRICAN AMERICAN: >60
GFR NON-AFRICAN AMERICAN: >60
GLOBULIN: 2.8 G/DL
GLUCOSE BLD-MCNC: 99 MG/DL (ref 70–99)
GLUCOSE URINE: NEGATIVE MG/DL
HCG(URINE) PREGNANCY TEST: NEGATIVE
HCT VFR BLD CALC: 38.5 % (ref 36–48)
HEMOGLOBIN: 12.7 G/DL (ref 12–16)
KETONES, URINE: NEGATIVE MG/DL
LEUKOCYTE ESTERASE, URINE: NEGATIVE
LYMPHOCYTES ABSOLUTE: 2.2 K/UL (ref 1–5.1)
LYMPHOCYTES RELATIVE PERCENT: 32.8 %
MCH RBC QN AUTO: 29.2 PG (ref 26–34)
MCHC RBC AUTO-ENTMCNC: 33.1 G/DL (ref 31–36)
MCV RBC AUTO: 88.3 FL (ref 80–100)
MICROSCOPIC EXAMINATION: NORMAL
MONOCYTES ABSOLUTE: 0.6 K/UL (ref 0–1.3)
MONOCYTES RELATIVE PERCENT: 9.8 %
NEUTROPHILS ABSOLUTE: 3.4 K/UL (ref 1.7–7.7)
NEUTROPHILS RELATIVE PERCENT: 51.5 %
NITRITE, URINE: NEGATIVE
PDW BLD-RTO: 15.7 % (ref 12.4–15.4)
PH UA: 6.5 (ref 5–8)
PLATELET # BLD: 292 K/UL (ref 135–450)
PMV BLD AUTO: 9.7 FL (ref 5–10.5)
POTASSIUM SERPL-SCNC: 3.6 MMOL/L (ref 3.5–5.1)
PROTEIN UA: NEGATIVE MG/DL
RBC # BLD: 4.36 M/UL (ref 4–5.2)
SODIUM BLD-SCNC: 141 MMOL/L (ref 136–145)
SPECIFIC GRAVITY UA: 1.01 (ref 1–1.03)
TOTAL PROTEIN: 7.6 G/DL (ref 6.4–8.2)
URINE TYPE: NORMAL
UROBILINOGEN, URINE: 0.2 E.U./DL
VITAMIN B-12: 1526 PG/ML (ref 211–911)
WBC # BLD: 6.6 K/UL (ref 4–11)

## 2020-05-20 PROCEDURE — 99442 PR PHYS/QHP TELEPHONE EVALUATION 11-20 MIN: CPT | Performed by: INTERNAL MEDICINE

## 2020-05-20 NOTE — PROGRESS NOTES
female being evaluated by a Virtual phone visit encounter to address concerns as mentioned above. A caregiver was present when appropriate. Due to this being a TeleHealth encounter (During IOPAJ-25 public health emergency), evaluation of the following organ systems was limited: Vitals/Constitutional/EENT/Resp/CV/GI//MS/Neuro/Skin/Heme-Lymph-Imm. Pursuant to the emergency declaration under the [address] [place] [address] and the [employer] and [employer] Act, this Virtual Visit was conducted with patient's (and/or legal guardian's) consent, to reduce the patient's risk of exposure to COVID-19 and provide necessary medical care. The patient (and/or legal guardian) has also been advised to contact this office for worsening conditions or problems, and seek emergency medical treatment and/or call 911 if deemed necessary. Patient identification was verified at the start of the visit: Yes    Total time spent on this encounter: 15 minutes    Services were provided through a phone synchronous discussion virtually to substitute for in-person clinic visit. Patient and provider were located at their individual homes. --Verona Lindo MD on 5/20/2020 at 10:33 PM    An electronic signature was used to authenticate this note.

## 2020-05-22 LAB — URINE CULTURE, ROUTINE: NORMAL

## 2020-07-06 ENCOUNTER — TELEPHONE (OUTPATIENT)
Dept: INTERNAL MEDICINE CLINIC | Age: 25
End: 2020-07-06

## 2020-07-06 NOTE — TELEPHONE ENCOUNTER
Patient called because she is experiencing red, itchy, swollen eyes for the last 2-3 days. She is not sure if it is related to a new medication she started about a week ago called Solsenacin 10 mg tablets? Is appointment needed? If so, what type? Please call at number provided to discuss. Thanks.

## 2020-07-06 NOTE — TELEPHONE ENCOUNTER
Tell her to go off the new Vesicare medication and see if symptoms resolve. She can also try Benadryl over-the-counter in case this is an allergic reaction. In addition she can try Zaditor antihistamine eyedrops.   Please make a video visit if symptoms persist.

## 2020-07-06 NOTE — TELEPHONE ENCOUNTER
She called urologist and got information from them they said they do not think the medication is the issue.  Will try eye drops and if symptoms do not improve will call to do vv

## 2020-07-10 ENCOUNTER — E-VISIT (OUTPATIENT)
Dept: INTERNAL MEDICINE CLINIC | Age: 25
End: 2020-07-10

## 2020-07-10 PROCEDURE — 99421 OL DIG E/M SVC 5-10 MIN: CPT | Performed by: INTERNAL MEDICINE

## 2020-07-10 RX ORDER — AMOXICILLIN AND CLAVULANATE POTASSIUM 875; 125 MG/1; MG/1
1 TABLET, FILM COATED ORAL 2 TIMES DAILY
Qty: 20 TABLET | Refills: 0 | Status: SHIPPED | OUTPATIENT
Start: 2020-07-10 | End: 2020-07-20

## 2020-07-10 ASSESSMENT — LIFESTYLE VARIABLES: SMOKING_STATUS: NO, I'VE NEVER SMOKED

## 2020-07-12 ENCOUNTER — TELEPHONE (OUTPATIENT)
Dept: INTERNAL MEDICINE CLINIC | Age: 25
End: 2020-07-12

## 2020-07-12 RX ORDER — ONDANSETRON 4 MG/1
4 TABLET, ORALLY DISINTEGRATING ORAL 3 TIMES DAILY PRN
Qty: 21 TABLET | Refills: 0 | Status: SHIPPED | OUTPATIENT
Start: 2020-07-12 | End: 2021-04-16

## 2020-07-12 RX ORDER — DOXYCYCLINE HYCLATE 100 MG
100 TABLET ORAL 2 TIMES DAILY
Qty: 14 TABLET | Refills: 0 | Status: SHIPPED | OUTPATIENT
Start: 2020-07-12 | End: 2020-07-19

## 2020-07-13 NOTE — TELEPHONE ENCOUNTER
Took first dose of augmentin today for sinus infection  Diarrhea, nausea and vomiting afterwards. Multiple vomiting episodes  Had this last time she had augmentin as well now that she thinks of it - from allergist.   No hives, wheezing, mouth/tongue/throat swelling. Will call in nausea medication and change antibiotic to doxycycline. Do not take dose of antibiotic tonight. Slow hydration  If continues to vomit and unable to take po will need to go to ER for evaluation.

## 2020-08-04 ENCOUNTER — TELEPHONE (OUTPATIENT)
Dept: INTERNAL MEDICINE CLINIC | Age: 25
End: 2020-08-04

## 2020-08-04 RX ORDER — FLUCONAZOLE 150 MG/1
150 TABLET ORAL ONCE
Qty: 2 TABLET | Refills: 0 | Status: SHIPPED | OUTPATIENT
Start: 2020-08-04 | End: 2020-08-04

## 2020-08-04 NOTE — TELEPHONE ENCOUNTER
Had allergic reaction to first antibiotic 3-4 weeks ago.  on call sent in something different. Patient states usually when she takes antibiotics she gets Diflucan to prevent yeast infections. She didn't request it at the time and now has one.  Requetsing difulcan be sent to [facility] on [address]

## 2020-08-04 NOTE — TELEPHONE ENCOUNTER
Left message advising patient prescription has been sent to pharmacy. If any additional questions or concerns please call office.

## 2020-11-30 ENCOUNTER — TELEPHONE (OUTPATIENT)
Dept: INTERNAL MEDICINE CLINIC | Age: 25
End: 2020-11-30

## 2020-11-30 NOTE — TELEPHONE ENCOUNTER
Spoke with patient who advises coworker tested positive for Covid. Patient has been advised to stay off work until 12/09/2020 by employer but may return to work if ok with PCP and would need statement. Patient does advise she has chronic congestion and runny nose. She does have some shortness of breath at times with her metoprolol.

## 2020-11-30 NOTE — TELEPHONE ENCOUNTER
Is she interested in getting Covid tested? Otherwise would follow the regular guidance of her employer.

## 2020-11-30 NOTE — TELEPHONE ENCOUNTER
----- Message from Annie Gomez sent at 11/30/2020 11:12 AM EST -----  Subject: Message to Provider    QUESTIONS  Information for Provider? Pt states she was tested for covid it was   negative how employer wanted her to quarantined until 12/09/20   patients if pcp symptoms are note related to covid she can return with a   note please advise.   ---------------------------------------------------------------------------  --------------  CALL BACK INFO  What is the best way for the office to contact you? OK to leave message on   voicemail  Preferred Call Back Phone Number? [phone]  ---------------------------------------------------------------------------  --------------  SCRIPT ANSWERS  Relationship to Patient?  Self

## 2020-12-16 ENCOUNTER — OFFICE VISIT (OUTPATIENT)
Dept: PRIMARY CARE CLINIC | Age: 25
End: 2020-12-16
Payer: COMMERCIAL

## 2020-12-16 ENCOUNTER — TELEPHONE (OUTPATIENT)
Dept: INTERNAL MEDICINE CLINIC | Age: 25
End: 2020-12-16

## 2020-12-16 PROCEDURE — 99211 OFF/OP EST MAY X REQ PHY/QHP: CPT | Performed by: NURSE PRACTITIONER

## 2020-12-16 NOTE — TELEPHONE ENCOUNTER
----- Message from Shaan Maradiaga sent at 12/16/2020 11:25 AM EST -----  Subject: Message to Provider    QUESTIONS  Information for Provider? Pt called and sad she is having some covid   symptoms and she wants to know if here is anything else she should be   doing by side getting tested. ---------------------------------------------------------------------------  --------------  Jazmyn MEDINA  What is the best way for the office to contact you? OK to leave message on   voicemail  Preferred Call Back Phone Number? [phone]  ---------------------------------------------------------------------------  --------------  SCRIPT ANSWERS  Relationship to Patient?  Self

## 2020-12-16 NOTE — TELEPHONE ENCOUNTER
Patient is scheduled for Covid testing at [facility] this afternoon. No known Covid exposure. Fever of 99.9 to 100.4 yesterday and today. Headache and scratchy throat. Body aches.

## 2020-12-17 ENCOUNTER — VIRTUAL VISIT (OUTPATIENT)
Dept: INTERNAL MEDICINE CLINIC | Age: 25
End: 2020-12-17
Payer: COMMERCIAL

## 2020-12-17 ENCOUNTER — TELEPHONE (OUTPATIENT)
Dept: INTERNAL MEDICINE CLINIC | Age: 25
End: 2020-12-17

## 2020-12-17 LAB — SARS-COV-2: DETECTED

## 2020-12-17 PROCEDURE — 99442 PR PHYS/QHP TELEPHONE EVALUATION 11-20 MIN: CPT | Performed by: INTERNAL MEDICINE

## 2020-12-17 RX ORDER — SOLIFENACIN SUCCINATE 10 MG/1
5 TABLET, FILM COATED ORAL DAILY
COMMUNITY
End: 2021-02-04

## 2020-12-17 ASSESSMENT — PATIENT HEALTH QUESTIONNAIRE - PHQ9
1. LITTLE INTEREST OR PLEASURE IN DOING THINGS: 0
SUM OF ALL RESPONSES TO PHQ QUESTIONS 1-9: 0
2. FEELING DOWN, DEPRESSED OR HOPELESS: 0
SUM OF ALL RESPONSES TO PHQ QUESTIONS 1-9: 0
SUM OF ALL RESPONSES TO PHQ9 QUESTIONS 1 & 2: 0
SUM OF ALL RESPONSES TO PHQ QUESTIONS 1-9: 0

## 2020-12-17 NOTE — TELEPHONE ENCOUNTER
Patient states she started having burning in her nose this morning and when she blew her nose there was blood. Previous advice was to schedule VV if symptoms worsen. Wants to know if she should schedule virtual. Had COVID test yesterday. (Results just came in while putting in this message).  Feels a little shortness of breath bu not anything she feels is concerning at this point    Patient wants to know if ok to take Mucinex or Dayquil along with the Tylenol she is taking

## 2020-12-17 NOTE — PROGRESS NOTES
2020    TELEHEALTH EVALUATION -- Audio/Visual (During JGKMP-52 public health emergency)    HPI:    Ria Farr (:  1995) has requested a phone evaluation for the following concern(s):    Patient was found to be Covid positive today. Her symptoms started on December 15 with fatigue and headache. Today she has sore throat, dysphagia and nasal congestion myalgias. Her temperature has been between 99.8 and 100.4. When she takes Tylenol she feels better. She denies much cough or shortness of breath. She has not lost sense of smell. Review of Systems    Prior to Visit Medications    Medication Sig Taking? Authorizing Provider   solifenacin (VESICARE) 10 MG tablet Take 5 mg by mouth daily Yes Historical Provider, MD   mirabegron (MYRBETRIQ) 25 MG TB24 Take 25 mg by mouth daily Yes Historical Provider, MD   ondansetron (ZOFRAN-ODT) 4 MG disintegrating tablet Take 1 tablet by mouth 3 times daily as needed for Nausea or Vomiting Yes Susan Laura MD   metoprolol tartrate (LOPRESSOR) 25 MG tablet TAKE 0.5 TABLETS BY MOUTH DAILY TAKE HALF TAB DAILY AS NEEDED Yes Mireya Moreira MD   NEXPLANON 68 MG implant  Yes Historical Provider, MD   Cyanocobalamin (VITAMIN B-12) 1000 MCG TABS Take 1,000 mcg by mouth daily  Pérez Proctor MD       Social History     Tobacco Use    Smoking status: Never Smoker    Smokeless tobacco: Never Used   Substance Use Topics    Alcohol use: Yes     Frequency: 2-4 times a month     Drinks per session: 1 or 2     Binge frequency: Never     Comment: 4-5 times per month    Drug use: No        Past Medical History:   Diagnosis Date    Anxiety 3/1/2018    B12 deficiency     Non-allergic rhinitis     saw allergist dr Julianna Hernandez, negative for trees, grasses, molds    Tachycardia     ectopic atrial rhythm, inappropriate sinus tachycardia       PHYSICAL EXAMINATION:    Phone visit so limited. She does not sound dyspneic. ASSESSMENT/PLAN:  1.  COVID-19 Advised rest, fluids, and Tylenol from all products up to 3000 mg/day. For the nasal burning, she can use Ocean nasal saline. For the nosebleed she can use Afrin and limited amounts. She can use Chloraseptic for the sore throat. Told her to try and purchase a pulse oximeter and make sure her oxygen is staying over 94%. Told her she needs to stay isolated for 10 days from the onset of symptoms. Other household contacts must remain in quarantine for 10 days unless they have a negative test in which case they can leave quarantine after 7 days. Return if symptoms worsen or fail to improve. Tommie Gregg is a 26 y.o. female being evaluated by a phone visit) encounter to address concerns as mentioned above. A caregiver was present when appropriate. Due to this being a TeleHealth encounter (During [facility]- public health emergency), evaluation of the following organ systems was limited: Vitals/Constitutional/EENT/Resp/CV/GI//MS/Neuro/Skin/Heme-Lymph-Imm. Pursuant to the emergency declaration under the [address] [place] [address] authority and the [employer] and [employer] Act, this Virtual Visit was conducted with patient's (and/or legal guardian's) consent, to reduce the patient's risk of exposure to COVID-19 and provide necessary medical care. The patient (and/or legal guardian) has also been advised to contact this office for worsening conditions or problems, and seek emergency medical treatment and/or call 911 if deemed necessary. Patient identification was verified at the start of the visit: Yes    Total time spent on this encounter: 15min    Services were provided through a phone synchronous discussion virtually to substitute for in-person clinic visit. Patient and provider were located at their individual homes.     --Penny Hummel MD on 12/17/2020 at 5:02 PM

## 2020-12-17 NOTE — TELEPHONE ENCOUNTER
Call from pt concerned about her pulse ox reading. Diagnosed with COVID-19. Pt reports cough, low grade fever, aches, headache. She does report slightly SOB. Her SaO2 has been %. She has been using different fingers which give different readings. Advised that her pulse Ox was OK at this time. She should continue to monitor her breathing and report if she was getting more SOB or if her pulse ox was consistently running below 95%.

## 2021-01-08 DIAGNOSIS — R14.0 ABDOMINAL BLOATING: Primary | ICD-10-CM

## 2021-01-18 ENCOUNTER — TELEPHONE (OUTPATIENT)
Dept: INTERNAL MEDICINE CLINIC | Age: 26
End: 2021-01-18

## 2021-01-28 NOTE — PROGRESS NOTES
2021    TELEHEALTH EVALUATION -- Audio/Visual (During -84 public health emergency)    HPI:    Kaylen Pitts (:  1995) has requested an audio/video evaluation for the following concern(s):  tachycardia. PMH significant for anxiety. Pt has been following Dr. Ruth Herman for tachycardia, palpitations, and dizziness. Her palpitations are under better controlled with metoprolol daily and then prn in the evening. LVEF was normal on echo. In 3/2018, she took Paxil for one day and developed increase in palpitations, so she stopped taking it.  4 day CAM (2019) showed SR and ectopic atrial rhythm with average HR 83 () and symptoms correlating with SR, ST, and EAR. No arrhythmias noted. Negative tilt table test (10/2019) with no cardioinhibitory or vasodepressor response. Her HR response was more consistent with IST. Normal stress echo (10/2019). Interval History:   Due to limiting exposure to COVID-19, we are conducting our visit with the pt via the phone today. She reports the SOB, CP, and palpitations have been going on for the past 2 years. The palpitations occur more frequently in the morning, waking her up. She becomes SOB, checks her HR and typically HR is over 100 bpm.  She notices that dehydration may be precipitating factor. She also reports that during exercise, once her HR increases, she becomes dizzy, requiring her to sit down. Denies syncope. Reports this happens about 3 times a week. She drinks about 100-125 oz of water a day. Drinks coffee/tea and eats chocolate sporadically, tries to avoid caffeine. She drinks ETOH rarely. She does feel an improvement in palpitations since being on the metoprolol. She recently graduated with a social work degree. Plans on obtaining her Master's degree next year. Review of Systems    Prior to Visit Medications    Medication Sig Taking?  Authorizing Provider tolterodine (DETROL LA) 4 MG extended release capsule TAKE 1 CAPSULE BY MOUTH EVERY DAY Yes Historical Provider, MD   ondansetron (ZOFRAN-ODT) 4 MG disintegrating tablet Take 1 tablet by mouth 3 times daily as needed for Nausea or Vomiting Yes Andres Ashby MD   metoprolol tartrate (LOPRESSOR) 25 MG tablet TAKE 0.5 TABLETS BY MOUTH DAILY TAKE HALF TAB DAILY AS NEEDED Yes Jeremiah Perez MD   NEXPLANON 68 MG implant  Yes Historical Provider, MD   solifenacin (VESICARE) 10 MG tablet Take 5 mg by mouth daily  Historical Provider, MD   mirabegron (MYRBETRIQ) 25 MG TB24 Take 25 mg by mouth daily  Historical Provider, MD   Cyanocobalamin (VITAMIN B-12) 1000 MCG TABS Take 1,000 mcg by mouth daily  Jana Castle MD       Social History     Tobacco Use    Smoking status: Never Smoker    Smokeless tobacco: Never Used   Substance Use Topics    Alcohol use: Yes     Frequency: 2-4 times a month     Drinks per session: 1 or 2     Binge frequency: Never     Comment: 4-5 times per month    Drug use: No          PHYSICAL EXAMINATION:  [ INSTRUCTIONS:  \"[x]\" Indicates a positive item  \"[]\" Indicates a negative item  -- DELETE ALL ITEMS NOT EXAMINED]  Vital Signs: (As obtained by patient/caregiver or practitioner observation)    Blood pressure-  Heart rate-    Respiratory rate-    Temperature-  Pulse oximetry-     Constitutional: [x] Appears well-developed and well-nourished [] No apparent distress      [] Abnormal-   Mental status  [x] Alert and awake  [] Oriented to person/place/time []Able to follow commands      Eyes:  EOM    [x]  Normal  [] Abnormal-  Sclera  [x]  Normal  [] Abnormal -         Discharge []  None visible  [] Abnormal -    HENT:   [x] Normocephalic, atraumatic.   [] Abnormal   [] Mouth/Throat: Mucous membranes are moist.     External Ears [x] Normal  [] Abnormal-     Neck: [x] No visualized mass Pulmonary/Chest: [x] Respiratory effort normal.  [x] No visualized signs of difficulty breathing or respiratory distress        [] Abnormal-      Musculoskeletal:   [x] Normal gait with no signs of ataxia         [x] Normal range of motion of neck        [] Abnormal-       Neurological:        [x] No Facial Asymmetry (Cranial nerve 7 motor function) (limited exam to video visit)          [x] No gaze palsy        [] Abnormal-         Skin:        [x] No significant exanthematous lesions or discoloration noted on facial skin         [] Abnormal-            Psychiatric:       [x] Normal Affect [] No Hallucinations        [] Abnormal-     Other pertinent observable physical exam findings-     Studies:   1. 4 day CAM (8/29-9/2/20):  SR and EAR with average HR 83 (). No arrhythmias noted. Symptoms correlating with SR / Glenn Postin / EAR    2. Echo 3/16/18:   Summary   Normal left ventricle size, wall thickness, and systolic function with an   estimated ejection fraction of 55-60%.   No regional wall motion abnormalities are seen. Normal diastolic function.   Trivial tricuspid regurgitation.   Estimated pulmonary artery systolic pressure is normal at 27 mmHg assuming a   right atrial pressure of 3 mmHg. 3. Stress Echo 10/1/19:  Summary   Baseline resting echocardiogram shows normal global LV systolic function   with an ejection fraction of 55% and uniform myocardial segmental wall   motion. Following stress there was uniform augmentation of all myocardial   segments, with the exception of an abnormal septal motion, with appropriate   hyperdynamic LV systolic response to stress with an ejection fraction of   65%. Normal stress ECHO. 4. Cath: none    The MCOT, echocardiogram, stress test, and coronary angiography/PCI were reviewed by myself and used for my plan of care. Procedures:  1. None    Assessment/Plan:  1.  IST, on metoprolol  2. Palpitations  3.   Dizziness Symptoms consistent with vasovagal syndrome  Normal stress echo  Negative tilt study  Monitor with SR and EAR, but no arrhythmias. Symptoms correlating with SR, ST, and EAR  On metoprolol 12.5 mg daily (with prn dose in the evening)    Recommended adequate hydration (at least 100 oz/day), eliminate caffeine and ETOH from diet    She started feeling more anxiety/depression and planning to start on antidepressant medications. Her predominant concern is interactions between metoprolol/Lexapro versus Zoloft. Discussed in detail about the side effects and interactions of these medications. If needed, we can also switch to nadolol, which have minimal interactions. Highly encouraged her to touch base with her PCP first and see what kind of antidepressants they wanted to start. Depends on this, we may be able to change to nadolol versus continue the same medications. No follow-ups on file. Meron Jaime is a 26 y.o. female being evaluated by a Virtual Visit (video visit) encounter to address concerns as mentioned above. A caregiver was present when appropriate. Due to this being a TeleHealth encounter (During [employer]-10 public health emergency), evaluation of the following organ systems was limited: Vitals/Constitutional/EENT/Resp/CV/GI//MS/Neuro/Skin/Heme-Lymph-Imm. Pursuant to the emergency declaration under the [address] [place] [address], [address] [place] [address] authority and the [employer] and [employer] Act, this Virtual Visit was conducted with patient's (and/or legal guardian's) consent, to reduce the patient's risk of exposure to COVID-19 and provide necessary medical care. The patient (and/or legal guardian) has also been advised to contact this office for worsening conditions or problems, and seek emergency medical treatment and/or call 911 if deemed necessary.      Patient identification was verified at the start of the visit: Yes

## 2021-02-04 ENCOUNTER — VIRTUAL VISIT (OUTPATIENT)
Dept: CARDIOLOGY CLINIC | Age: 26
End: 2021-02-04
Payer: COMMERCIAL

## 2021-02-04 DIAGNOSIS — R00.0 INAPPROPRIATE SINUS TACHYCARDIA: Primary | ICD-10-CM

## 2021-02-04 DIAGNOSIS — R00.2 PALPITATION: ICD-10-CM

## 2021-02-04 PROCEDURE — 99213 OFFICE O/P EST LOW 20 MIN: CPT | Performed by: INTERNAL MEDICINE

## 2021-02-04 RX ORDER — TOLTERODINE 4 MG/1
CAPSULE, EXTENDED RELEASE ORAL
COMMUNITY
Start: 2021-01-07 | End: 2021-04-16

## 2021-02-12 ENCOUNTER — TELEPHONE (OUTPATIENT)
Dept: INTERNAL MEDICINE CLINIC | Age: 26
End: 2021-02-12

## 2021-02-12 NOTE — TELEPHONE ENCOUNTER
Not sure if she saw my prior lab note. Will send [employer] message now and call Monday. --  Written by Nuvia Dow MD on 2/9/2021  8:28 PM  Screening test of stool for IBD looks normal although this is not a definitive test.     The test for H pylori is positive. This could explain at least some of the bloating. Would you like to further discuss before I treat it at upcoming visit 2/22 or do you want me to send the 3 drugs you will need to take for 14 days to your Pharmacy \"now\" and then FU on 2/22.

## 2021-02-22 ENCOUNTER — VIRTUAL VISIT (OUTPATIENT)
Dept: INTERNAL MEDICINE CLINIC | Age: 26
End: 2021-02-22
Payer: COMMERCIAL

## 2021-02-22 ENCOUNTER — TELEPHONE (OUTPATIENT)
Dept: INTERNAL MEDICINE CLINIC | Age: 26
End: 2021-02-22

## 2021-02-22 DIAGNOSIS — F41.1 GENERALIZED ANXIETY DISORDER: ICD-10-CM

## 2021-02-22 DIAGNOSIS — R14.0 ABDOMINAL BLOATING: Primary | ICD-10-CM

## 2021-02-22 PROCEDURE — 99214 OFFICE O/P EST MOD 30 MIN: CPT | Performed by: INTERNAL MEDICINE

## 2021-02-22 RX ORDER — ESCITALOPRAM OXALATE 5 MG/1
5 TABLET ORAL DAILY
Qty: 30 TABLET | Refills: 3 | Status: SHIPPED | OUTPATIENT
Start: 2021-02-22 | End: 2021-08-24

## 2021-02-22 ASSESSMENT — PATIENT HEALTH QUESTIONNAIRE - PHQ9: 1. LITTLE INTEREST OR PLEASURE IN DOING THINGS: 1

## 2021-02-22 NOTE — PROGRESS NOTES
Meron Jaime (:  1995) is a 26 y.o. female, Established patient here for evaluation of the following chief complaint(s): Discuss Medications (Discuss recent stool culture results.)      ASSESSMENT/PLAN:  1. Abdominal bloating  She was found to be H. pylori positive on stool antigen test.  This may also explain her low B12 level. She has a visit with gastroenterology later this week. They can discuss presumptively treating or whether EGD is indicated. Patient has had recent weight loss. 2. Generalized anxiety disorder  Patient is interested in a trial of an SSRI. We discussed Lexapro because it has relatively lower gastrointestinal side effects. Due to her current bloating, advised her to perhaps wait a few weeks before she starts it. She also continues to see her psychologist.  There is a mild drug interaction with metoprolol. Patient is trying to wean her metoprolol as she discussed with her cardiologist.      Return in about 2 months (around 2021). SUBJECTIVE/OBJECTIVE:  HPI   Video visit to discuss recent findings of H. pylori on stool antigen. This test was obtained due to her persistent complaints of abdominal bloating and pain, nausea and gaseousness. She has also had about 5 pounds of weight loss. She also complains of worsening anxiety. She has had trouble with this since her teen years. She briefly tried Paxil which she did not like. She is seeing a psychologist Dr. Marlo Salazar. She has also been seeing a urogynecologist-Dr. Hazle Siemens, about urge incontinence. Detrol does help but she does experience side effects of dry mouth. 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 She may need some paperwork completed and shots/titers.       Review of Systems    Patient-Reported Vitals 2021   Patient-Reported Weight 99 lbs   Patient-Reported Height 60\"   Patient-Reported Pulse -   Patient-Reported Temperature -         Past Medical History:   Diagnosis Date  Abdominal bloating     h pylori pos    Anxiety 03/01/2018    B12 deficiency     COVID-19 12/17/2020    Non-allergic rhinitis     saw allergist dr Ike Mckay, negative for trees, grasses, molds    Tachycardia 01/28/208    ectopic atrial rhythm, inappropriate sinus tachycardia    Urge incontinence        Current Outpatient Medications   Medication Sig Dispense Refill    escitalopram (LEXAPRO) 5 MG tablet Take 1 tablet by mouth daily 30 tablet 3    tolterodine (DETROL LA) 4 MG extended release capsule TAKE 1 CAPSULE BY MOUTH EVERY DAY      metoprolol tartrate (LOPRESSOR) 25 MG tablet TAKE 0.5 TABLETS BY MOUTH DAILY TAKE HALF TAB DAILY AS NEEDED 90 tablet 2    NEXPLANON 68 MG implant       ondansetron (ZOFRAN-ODT) 4 MG disintegrating tablet Take 1 tablet by mouth 3 times daily as needed for Nausea or Vomiting (Patient not taking: Reported on 2/22/2021) 21 tablet 0    Cyanocobalamin (VITAMIN B-12) 1000 MCG TABS Take 1,000 mcg by mouth daily 30 tablet 3     No current facility-administered medications for this visit. Constitutional: [x] Appears well-developed and well-nourished [x] No apparent distress        Mental status  [x] Alert and awake  [x] Oriented to person/place/time [x]Able to follow commands      Eyes:  EOM    [x]  Normal   Sclera  [x]  Normal     HENT:   [x] Normocephalic, atraumatic.     Mouth/Throat: Mucous membranes are moist.     External Ears [x] Normal      Neck: [x] No visualized mass     Pulmonary/Chest: [x] Respiratory effort normal.  [x] No visualized signs of difficulty breathing or respiratory distress           Musculoskeletal:   [x] Normal gait with no signs of ataxia         [x] Normal range of motion of neck          Neurological:        [x] No Facial Asymmetry (Cranial nerve 7 motor function) (limited exam to video visit)      Skin:        [x] No significant exanthematous lesions or discoloration noted on facial skin                  Psychiatric:       [x] Normal Affect Other pertinent observable physical exam findings-                Kaylen Pitts is a 26 y.o. female being evaluated by a Virtual Visit (video visit) encounter to address concerns as mentioned above. A caregiver was present when appropriate. Due to this being a TeleHealth encounter (During [employer]-48 public health emergency), evaluation of the following organ systems was limited: Vitals/Constitutional/EENT/Resp/CV/GI//MS/Neuro/Skin/Heme-Lymph-Imm. Pursuant to the emergency declaration under the [address] [place] [address] authority and the [employer] and [employer] Act, this Virtual Visit was conducted with patient's (and/or legal guardian's) consent, to reduce the patient's risk of exposure to COVID-19 and provide necessary medical care. The patient (and/or legal guardian) has also been advised to contact this office for worsening conditions or problems, and seek emergency medical treatment and/or call 911 if deemed necessary. Patient identification was verified at the start of the visit: {YES    Services were provided through a video synchronous discussion virtually to substitute for in-person clinic visit. Patient was located at home and provider was located in office or at home. An electronic signature was used to authenticate this note.     --Rehana Garduno MD

## 2021-02-22 NOTE — TELEPHONE ENCOUNTER
Patient is returning Jody's call regarding her appointment later today. Please contact patient at the number provided.

## 2021-03-29 DIAGNOSIS — E53.8 VITAMIN B12 DEFICIENCY: Primary | ICD-10-CM

## 2021-04-01 DIAGNOSIS — F41.1 GENERALIZED ANXIETY DISORDER: Primary | ICD-10-CM

## 2021-04-03 DIAGNOSIS — F41.1 GENERALIZED ANXIETY DISORDER: ICD-10-CM

## 2021-04-03 DIAGNOSIS — E53.8 VITAMIN B12 DEFICIENCY: ICD-10-CM

## 2021-04-03 LAB
TSH REFLEX: 1.72 UIU/ML (ref 0.27–4.2)
VITAMIN B-12: 782 PG/ML (ref 211–911)

## 2021-04-16 ENCOUNTER — OFFICE VISIT (OUTPATIENT)
Dept: INTERNAL MEDICINE CLINIC | Age: 26
End: 2021-04-16
Payer: COMMERCIAL

## 2021-04-16 VITALS
SYSTOLIC BLOOD PRESSURE: 96 MMHG | WEIGHT: 94 LBS | BODY MASS INDEX: 18.36 KG/M2 | TEMPERATURE: 98.4 F | HEART RATE: 82 BPM | DIASTOLIC BLOOD PRESSURE: 50 MMHG | OXYGEN SATURATION: 98 % | RESPIRATION RATE: 16 BRPM

## 2021-04-16 DIAGNOSIS — F41.1 GENERALIZED ANXIETY DISORDER: ICD-10-CM

## 2021-04-16 DIAGNOSIS — R14.0 BLOATING: ICD-10-CM

## 2021-04-16 DIAGNOSIS — R53.82 CHRONIC FATIGUE: Primary | ICD-10-CM

## 2021-04-16 PROCEDURE — 99214 OFFICE O/P EST MOD 30 MIN: CPT | Performed by: INTERNAL MEDICINE

## 2021-04-16 RX ORDER — CHOLECALCIFEROL (VITAMIN D3) 125 MCG
500 CAPSULE ORAL DAILY
Qty: 30 TABLET | Refills: 3 | Status: SHIPPED | COMMUNITY
Start: 2021-04-16 | End: 2022-09-01

## 2021-04-16 RX ORDER — CHOLECALCIFEROL (VITAMIN D3) 125 MCG
500 CAPSULE ORAL DAILY
Qty: 30 TABLET | Refills: 3 | Status: SHIPPED | COMMUNITY
Start: 2021-04-16 | End: 2021-04-16 | Stop reason: SDUPTHER

## 2021-04-16 RX ORDER — SOLIFENACIN SUCCINATE 5 MG/1
5 TABLET, FILM COATED ORAL DAILY
COMMUNITY
Start: 2021-03-23 | End: 2022-04-01

## 2021-04-16 NOTE — PATIENT INSTRUCTIONS
Add daily protein sandra    Nutritionist Jamey Lagunas -[phone]    Vitamin D3 1000 u/day and B12 500 mcg /day - add multivitamin    Egd/colon    Will get back to you after I talk to Dr. Liset Casarez talk about Lexapro    Good thick moisturizer

## 2021-04-16 NOTE — PROGRESS NOTES
Anna Mclean (:  1995) is a 26 y.o. female, here for evaluation of the following chief complaint(s):    Fatigue (Weakness and fatigue persists and recently restarted Vitamin B-12  at 500 iu.)      ASSESSMENT/PLAN:  1. Chronic fatigue  Encouraged patient to see the nutritionist to work on weight gain perhaps. Encouraged her to get more sleep. Told her to complete the gastroenterology work-up and if unrevealing, would further evaluate. Consider checking vitamin D. Told patient I would speak with the electrophysiologist about weaning metoprolol further. Recent B12 level was normal. Okay to take 500mcg daily to maintain normal levels. 2. Generalized anxiety disorder  I think anxiety is playing a role in how she feels. If fatigue is better off metoprolol, we will again discuss Lexapro. 3. Bloating  Patient was treated for H. pylori. EGD is pending. Return if symptoms worsen or fail to improve. SUBJECTIVE/OBJECTIVE:  HPI   Patient is here to follow-up several issues. She completed treatment for H. pylori but states that her bloating symptoms persist.  She also reports recent episode of blood in stool. Dr. Khurram Franz is planning an EGD and colonoscopy to further evaluate. Her weight is down from last year. Patient reports weakness, shakiness and fatigue. She is not sure if this is due to her medication, perhaps the beta-blocker. She has discussed possibly weaning metoprolol with her electrophysiologist (Kvng Leyva). 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 We discussed her typical diet. For breakfast she has bagel, yogurt, and coffee. For lunch she will have leftovers or a frozen meal.  At dinner she does not usually have a plan but tries to have protein. For exercise, she walks and runs 30 minutes/day. Typically sleeps less than 7 hours per night. We discussed the dry skin on her hands. She states she washes them a lot at work. Past Medical History:   Diagnosis Date    Abdominal bloating     h pylori pos    Anxiety 03/01/2018    B12 deficiency     COVID-19 12/17/2020    Non-allergic rhinitis     saw allergist dr Kapil Parmar, negative for trees, grasses, molds    Tachycardia 01/28/208    ectopic atrial rhythm, inappropriate sinus tachycardia    Urge incontinence        Current Outpatient Medications   Medication Sig Dispense Refill    solifenacin (VESICARE) 5 MG tablet Take 5 mg by mouth daily      vitamin B-12 (CYANOCOBALAMIN) 500 MCG tablet Take 1 tablet by mouth daily 30 tablet 3    metoprolol tartrate (LOPRESSOR) 25 MG tablet TAKE 0.5 TABLETS BY MOUTH DAILY TAKE HALF TAB DAILY AS NEEDED 90 tablet 2    NEXPLANON 68 MG implant       escitalopram (LEXAPRO) 5 MG tablet Take 1 tablet by mouth daily 30 tablet 3     No current facility-administered medications for this visit. Physical Exam  Vitals signs reviewed. Constitutional:       Appearance: Normal appearance. HENT:      Head: Normocephalic and atraumatic. Cardiovascular:      Rate and Rhythm: Normal rate and regular rhythm. Pulmonary:      Effort: Pulmonary effort is normal.      Breath sounds: Normal breath sounds. Abdominal:      General: Abdomen is flat. Bowel sounds are normal.   Musculoskeletal:      Right lower leg: No edema. Left lower leg: No edema. Lymphadenopathy:      Cervical: No cervical adenopathy. Skin:     Comments: Dry skin on hands   Neurological:      General: No focal deficit present. Mental Status: She is alert and oriented to person, place, and time. Cranial Nerves: No cranial nerve deficit. Gait: Gait normal.      Comments: Mildly tremulous in hands   Psychiatric:         Mood and Affect: Mood normal.         Thought Content:  Thought content normal.         Judgment: Judgment normal.               This note was generated completely or in part utilizing Dragon dictation speech recognition software. Occasionally, words are mistranscribed and despite editing, the text may contain inaccuracies due to incorrect word recognition. If further clarification is needed please contact the office at [phone]          An electronic signature was used to authenticate this note.     --Elly Calvert MD

## 2021-05-12 ENCOUNTER — TELEPHONE (OUTPATIENT)
Dept: INTERNAL MEDICINE CLINIC | Age: 26
End: 2021-05-12

## 2021-05-13 NOTE — TELEPHONE ENCOUNTER
See if patient's area behind her ear is improving (from [id number]). If not, will refer ENT. The Friday AM appointment with me can be cancelled.

## 2021-08-24 RX ORDER — SERTRALINE HYDROCHLORIDE 25 MG/1
25 TABLET, FILM COATED ORAL DAILY
Qty: 30 TABLET | Refills: 2 | Status: SHIPPED | OUTPATIENT
Start: 2021-08-24 | End: 2021-09-20

## 2021-08-27 ENCOUNTER — OFFICE VISIT (OUTPATIENT)
Dept: ENT CLINIC | Age: 26
End: 2021-08-27
Payer: COMMERCIAL

## 2021-08-27 VITALS
SYSTOLIC BLOOD PRESSURE: 112 MMHG | HEIGHT: 60 IN | BODY MASS INDEX: 19.63 KG/M2 | WEIGHT: 100 LBS | TEMPERATURE: 98.6 F | DIASTOLIC BLOOD PRESSURE: 71 MMHG | HEART RATE: 97 BPM

## 2021-08-27 DIAGNOSIS — R22.1 NECK MASS: Primary | ICD-10-CM

## 2021-08-27 PROCEDURE — 99213 OFFICE O/P EST LOW 20 MIN: CPT | Performed by: OTOLARYNGOLOGY

## 2021-08-27 ASSESSMENT — ENCOUNTER SYMPTOMS
EYE REDNESS: 0
EYE PAIN: 0
STRIDOR: 0
EYE ITCHING: 0
DIARRHEA: 0
COUGH: 0
CHOKING: 0
RHINORRHEA: 0
SHORTNESS OF BREATH: 0
SORE THROAT: 0
NAUSEA: 0
SINUS PRESSURE: 0
PHOTOPHOBIA: 0
FACIAL SWELLING: 0
COLOR CHANGE: 0
TROUBLE SWALLOWING: 0
SINUS PAIN: 0
VOICE CHANGE: 0

## 2021-08-27 NOTE — PROGRESS NOTES
[facility]  [address], [address] [facility], [address]  P: [phone]  F: [phone]       Patient     Aster Woods  1995    ChiefComplaint     Chief Complaint   Patient presents with    Ear Problem     Patient is here today because she has a lump behind her right ear, it will swell and the go away and it also becomes painful and red       History of Present Illness     Aster Woods is a pleasant 23 y.o. female known to me who presents for swelling of the right postauricular region. For the past 4 months she has had intermittent swelling and tenderness have a nodularity around the mastoid tip in the right postauricular region. Denies any changes in hearing type symptoms or otorrhea. She denies any fevers, chills or night sweats. Denies any drainage from the site. She did try some topical antibiotic ointment or cream which did not help. There have been a few occasions of it swelling and then subsiding over the past 4 months. She has never been on oral antibiotics for this. Denies any other associated upper respiratory infection symptoms. She thinks she may have gotten her Covid vaccine in the right arm shortly prior to this.     Past Medical History     Past Medical History:   Diagnosis Date    Abdominal bloating     h pylori pos    Anxiety 03/01/2018    B12 deficiency     COVID-19 12/17/2020    [facility]20[facility]    Non-allergic rhinitis     saw allergist dr Najma Garcia, negative for trees, grasses, molds    Tachycardia 01/28/208    ectopic atrial rhythm, inappropriate sinus tachycardia    Urge incontinence        Past Surgical History     Past Surgical History:   Procedure Laterality Date    COLONOSCOPY  04/29/2021    terminal ileum ulcers, path pdg-possible crohn's    COLPOSCOPY      for ascus - [address] [place] [address]    UPPER GASTROINTESTINAL ENDOSCOPY  04/29/2021    mild erythema-gastric antrum, grade a reflux esophagitis    WISDOM TOOTH EXTRACTION         Family History     Family History   Problem Relation Age of Onset    High Blood Pressure Father     Breast Cancer Maternal Aunt     Diabetes type 2  Maternal Grandmother        Social History     Social History     Socioeconomic History    Marital status: Single     Spouse name: Not on file    Number of children: 0    Years of education: Not on file    Highest education level: Not on file   Occupational History    Occupation: reading [place]    Tobacco Use    Smoking status: Never Smoker    Smokeless tobacco: Never Used   Substance and Sexual Activity    Alcohol use: Yes     Comment: 4-5 times per month    Drug use: No    Sexual activity: Yes     Partners: Male     Comment: boyfriend   Other Topics Concern    Not on file   Social History Narrative    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     Social Determinants of Health     Financial Resource Strain:     Difficulty of Paying Living Expenses:    Food Insecurity:     Worried About Running Out of Food in the Last Year:     Ran Out of Food in the Last Year:    Transportation Needs:     Lack of Transportation (Medical):  Lack of Transportation (Non-Medical):    Physical Activity:     Days of Exercise per Week:     Minutes of Exercise per Session:    Stress:     Feeling of Stress :    Social Connections:     Frequency of Communication with Friends and Family:     Frequency of Social Gatherings with Friends and Family:     Attends Christian Services:     Active Member of Clubs or Organizations:     Attends Club or Organization Meetings:     Marital Status:    Intimate Partner Violence:     Fear of Current or Ex-Partner:     Emotionally Abused:     Physically Abused:     Sexually Abused:         Allergies     Allergies   Allergen Reactions    Augmentin [Amoxicillin-Pot Clavulanate] Nausea And Vomiting       Medications     Current Outpatient Medications   Medication Sig Dispense Refill    sertraline (ZOLOFT) 25 MG tablet Take 1 tablet by mouth daily 30 tablet 2    solifenacin (VESICARE) 5 MG tablet Take 5 mg by mouth daily      vitamin B-12 (CYANOCOBALAMIN) 500 MCG tablet Take 1 tablet by mouth daily 30 tablet 3    NEXPLANON 68 MG implant        No current facility-administered medications for this visit. Review of Systems     Review of Systems   Constitutional: Negative for chills, fatigue and fever. HENT: Negative for congestion, ear discharge, ear pain, facial swelling, hearing loss, nosebleeds, postnasal drip, rhinorrhea, sinus pressure, sinus pain, sneezing, sore throat, tinnitus, trouble swallowing and voice change. Eyes: Negative for photophobia, pain, redness, itching and visual disturbance. Respiratory: Negative for cough, choking, shortness of breath and stridor. Gastrointestinal: Negative for diarrhea and nausea. Musculoskeletal: Negative for neck pain and neck stiffness. Skin: Negative for color change and rash. Neurological: Negative for dizziness, facial asymmetry and light-headedness. Hematological: Negative for adenopathy. Psychiatric/Behavioral: Negative for agitation and confusion. PhysicalExam     Vitals:    08/27/21 0942   BP: 112/71   Pulse: 97   Temp: 98.6 °F (37 °C)       Physical Exam  Constitutional:       Appearance: She is well-developed. HENT:      Head: Normocephalic and atraumatic. Jaw: No trismus. Right Ear: Tympanic membrane, ear canal and external ear normal. No drainage. No middle ear effusion. Tympanic membrane is not perforated. Left Ear: Tympanic membrane, ear canal and external ear normal. No drainage. No middle ear effusion. Tympanic membrane is not perforated. Nose: No septal deviation, mucosal edema or rhinorrhea. Mouth/Throat:      Dentition: Normal dentition. Pharynx: Uvula midline. No oropharyngeal exudate. Eyes:      General: No scleral icterus. Right eye: No discharge.          Left eye: No discharge. Pupils: Pupils are equal, round, and reactive to light. Neck:      Thyroid: No thyromegaly. Trachea: Phonation normal. No tracheal deviation. Pulmonary:      Effort: Pulmonary effort is normal. No respiratory distress. Breath sounds: No stridor. Musculoskeletal:      Cervical back: Neck supple. Lymphadenopathy:      Cervical: No cervical adenopathy. Skin:     General: Skin is warm and dry. Neurological:      Mental Status: She is alert and oriented to person, place, and time. Cranial Nerves: No cranial nerve deficit. Psychiatric:         Behavior: Behavior normal.           Procedure           Assessment and Plan     1. Neck mass  No obvious evidence of palpable neck mass today. There is an area in the postauricular soft tissues that does appear to be a previously infected epidermal inclusion cyst versus cystic acne. No obvious palpable lymph node. I discussed with the patient this could be a reactive lymph node versus some type of infectious etiology given the tenderness. I would like to see her back when it swells up again. If it continues to recur, may need excision. Return if symptoms worsen or fail to improve. Portions of this note were dictated using Dragon.  There may be linguistic errors secondary to the use of this program.

## 2021-09-20 RX ORDER — SERTRALINE HYDROCHLORIDE 25 MG/1
TABLET, FILM COATED ORAL
Qty: 30 TABLET | Refills: 2 | Status: SHIPPED | OUTPATIENT
Start: 2021-09-20 | End: 2021-11-11

## 2021-11-09 ENCOUNTER — PATIENT MESSAGE (OUTPATIENT)
Dept: INTERNAL MEDICINE CLINIC | Age: 26
End: 2021-11-09

## 2021-11-09 NOTE — TELEPHONE ENCOUNTER
From: Lucia Lagos  To: Dr. Grisel Vivas: 11/9/2021 9:20 AM EST  Subject: Non-Urgent Medical Question    Leopoldo Weir    I'm reaching out because I've been experiencing some dizziness lately, possibly vertigo, and wanted to see if you had any thoughts on this? I assumed it was from a medication I started because dizziness could be a potential side effect so I stopped taking it but the vertigo still remains. I wanted to see if you knew of anything that could help with this, as it's a bit concerning. I was told that I potentially had vertigo around 2 years ago when I visited another [facility] location but not quite sure if that is what's going on. Thank you.

## 2021-11-11 ENCOUNTER — OFFICE VISIT (OUTPATIENT)
Dept: INTERNAL MEDICINE CLINIC | Age: 26
End: 2021-11-11
Payer: COMMERCIAL

## 2021-11-11 VITALS
BODY MASS INDEX: 19.14 KG/M2 | SYSTOLIC BLOOD PRESSURE: 112 MMHG | OXYGEN SATURATION: 99 % | DIASTOLIC BLOOD PRESSURE: 70 MMHG | WEIGHT: 98 LBS | TEMPERATURE: 97.6 F | HEART RATE: 90 BPM

## 2021-11-11 DIAGNOSIS — R73.9 HYPERGLYCEMIA: ICD-10-CM

## 2021-11-11 DIAGNOSIS — E53.8 B12 DEFICIENCY: ICD-10-CM

## 2021-11-11 DIAGNOSIS — K50.00 CROHN'S DISEASE OF SMALL INTESTINE WITHOUT COMPLICATION (HCC): ICD-10-CM

## 2021-11-11 DIAGNOSIS — R42 DIZZINESS: Primary | ICD-10-CM

## 2021-11-11 PROCEDURE — 99214 OFFICE O/P EST MOD 30 MIN: CPT | Performed by: INTERNAL MEDICINE

## 2021-11-11 RX ORDER — FLUTICASONE PROPIONATE 50 MCG
2 SPRAY, SUSPENSION (ML) NASAL DAILY
Qty: 16 G | Refills: 11 | Status: SHIPPED | OUTPATIENT
Start: 2021-11-11 | End: 2021-12-06

## 2021-11-11 RX ORDER — PANTOPRAZOLE SODIUM 40 MG/1
TABLET, DELAYED RELEASE ORAL
COMMUNITY
Start: 2021-09-03

## 2021-11-11 RX ORDER — MECLIZINE HCL 12.5 MG/1
12.5 TABLET ORAL 3 TIMES DAILY PRN
Qty: 30 TABLET | Refills: 0 | Status: SHIPPED | OUTPATIENT
Start: 2021-11-11 | End: 2021-11-21

## 2021-11-11 NOTE — PATIENT INSTRUCTIONS
Labs  flonase  Meclizine  --  Call Monday with update.  By then will have MRI from 2019 to review    Will discuss bloating with Dr. Bethany Bullard

## 2021-11-11 NOTE — PROGRESS NOTES
Comments: No nystagmus   Cardiovascular:      Rate and Rhythm: Normal rate and regular rhythm. Musculoskeletal:      Cervical back: Normal range of motion. Right lower leg: No edema. Left lower leg: Edema present. Neurological:      General: No focal deficit present. Mental Status: She is alert and oriented to person, place, and time. Mental status is at baseline. Cranial Nerves: No cranial nerve deficit. Motor: No weakness. Gait: Gait normal.      Comments: Neg romberg   Psychiatric:         Mood and Affect: Mood normal.               This note was generated completely or in part utilizing Dragon dictation speech recognition software. Occasionally, words are mistranscribed and despite editing, the text may contain inaccuracies due to incorrect word recognition. If further clarification is needed please contact the office at [phone]          An electronic signature was used to authenticate this note.     --Nurys Rodriguez MD

## 2021-11-15 DIAGNOSIS — R73.9 HYPERGLYCEMIA: ICD-10-CM

## 2021-11-16 LAB
ESTIMATED AVERAGE GLUCOSE: 102.5 MG/DL
HBA1C MFR BLD: 5.2 %

## 2021-12-06 RX ORDER — FLUTICASONE PROPIONATE 50 MCG
SPRAY, SUSPENSION (ML) NASAL
Qty: 16 G | Refills: 11 | Status: SHIPPED | OUTPATIENT
Start: 2021-12-06 | End: 2022-05-20

## 2021-12-06 NOTE — TELEPHONE ENCOUNTER
Last appointment: 11/11/2021  Next appointment: Visit date not found  Last refill: 11/11/2021  no return date given at last office visit

## 2021-12-15 ENCOUNTER — TELEPHONE (OUTPATIENT)
Dept: INTERNAL MEDICINE CLINIC | Age: 26
End: 2021-12-15

## 2021-12-15 NOTE — TELEPHONE ENCOUNTER
Called Central Sched[facility] and canceled her test for [facility] [address] [facility] due to it not being covered by insurance. She will call back to advise on what [facility] she would like to go to.

## 2021-12-15 NOTE — TELEPHONE ENCOUNTER
Patients most recent MRI was denied. Due to the doctor wanting th test to be done in the hospital setting. If they could let the patient have this done outside of the hospital it could be approved.  If they want to review this denial call [phone]

## 2021-12-15 NOTE — TELEPHONE ENCOUNTER
Left pt message to call back. Her insurance has denied her MRI that is scheduled for tonight. They will not cover this being done at the hospital but they will cover if done at [facility] or another imagine location. Please advise patient of this and if she would like to schedule with [facility] we would be happy to fax the order over to a location of her choice.

## 2021-12-15 NOTE — TELEPHONE ENCOUNTER
If the test can be done outside the hospital,like at [facility] then that is okay with me.

## 2021-12-21 ENCOUNTER — TELEPHONE (OUTPATIENT)
Dept: INTERNAL MEDICINE CLINIC | Age: 26
End: 2021-12-21

## 2021-12-21 NOTE — TELEPHONE ENCOUNTER
[facility] location is [place] [address] - 12/23 for 10 am appt  # [id number] auth number -  --  Please call [facility] and authorization coordinator to let them know the number. ----    Note to the chart because I plan to file complaint about [facility]. \"Called to appeal. Like last time I called \"[facility]\" for another patient, multiple questions, many unnecessary, were asked in an unprofessional manner and then when was getting close to speaking to a physician I was \"accidentally\" cut off. Got thru to another person who asked all the same questions. Megan had not updated her location to [employer] [facility] [employer]. Then put me on hold to \"document\" prior to transferring me for the peer to peer. Refused to give me number of supervisor that I could complain to. Finally spoke to physician Dr. Melanie Valdez (who did sound qualified to do her job) who approved the test on clinical grounds. Then needed to have specific [facility] location so called the patient to find that out. \"  Time spent 35 minutes.   ---

## 2021-12-21 NOTE — TELEPHONE ENCOUNTER
Roge Shaw called to inform the doctor that the MRI of the brain with and without contrast has been denied and will require an appeal. If the doctor is wiling to appeal this is th number she would need to call. The patient was scheduled to have this done this coming Thursday.         [phone]  Reference No: [id number]

## 2022-01-04 ENCOUNTER — OFFICE VISIT (OUTPATIENT)
Dept: ENT CLINIC | Age: 27
End: 2022-01-04
Payer: COMMERCIAL

## 2022-01-04 VITALS
HEART RATE: 94 BPM | SYSTOLIC BLOOD PRESSURE: 114 MMHG | HEIGHT: 60 IN | BODY MASS INDEX: 19.24 KG/M2 | DIASTOLIC BLOOD PRESSURE: 78 MMHG | WEIGHT: 98 LBS

## 2022-01-04 DIAGNOSIS — R51.9 SINUS HEADACHE: ICD-10-CM

## 2022-01-04 DIAGNOSIS — J34.2 DNS (DEVIATED NASAL SEPTUM): ICD-10-CM

## 2022-01-04 DIAGNOSIS — G43.809 VESTIBULAR MIGRAINE: ICD-10-CM

## 2022-01-04 DIAGNOSIS — R42 DIZZINESS: ICD-10-CM

## 2022-01-04 DIAGNOSIS — J32.4 CHRONIC PANSINUSITIS: Primary | ICD-10-CM

## 2022-01-04 PROCEDURE — 31231 NASAL ENDOSCOPY DX: CPT | Performed by: OTOLARYNGOLOGY

## 2022-01-04 PROCEDURE — 99214 OFFICE O/P EST MOD 30 MIN: CPT | Performed by: OTOLARYNGOLOGY

## 2022-01-04 ASSESSMENT — ENCOUNTER SYMPTOMS
FACIAL SWELLING: 0
VOICE CHANGE: 0
CHOKING: 0
COUGH: 0
SHORTNESS OF BREATH: 0
DIARRHEA: 0
TROUBLE SWALLOWING: 0
EYE ITCHING: 0
SINUS PAIN: 0
RHINORRHEA: 0
EYE REDNESS: 0
NAUSEA: 0
EYE PAIN: 0
SINUS PRESSURE: 1
PHOTOPHOBIA: 0
SORE THROAT: 0
STRIDOR: 0
COLOR CHANGE: 0

## 2022-01-04 NOTE — PROGRESS NOTES
[facility]  [address] [address] [facility], [address] [place] [address], [address] [place] [address] Ave  P: [phone]  F: [phone]       Patient     Alanna Florian  1995    ChiefComplaint     Chief Complaint   Patient presents with    Sinus Problem     Patient is here today because she is having sinus pressure and dizziness, she is also having headaches       History of Present Illness     Alanna Florian is a pleasant 23 y.o. female known to me who presents for multiple issues. For the past month, she has been experiencing sinus pressure, brain fog, dizziness. The dizziness she describes as a sensation of motion and lightheadedness. It can occur when she is standing or sitting. She sometimes feels like she loses her balance. She has not lost consciousness. She has not fallen. Given the symptoms, her PCP ordered an MRI. MRI revealed chronic pansinusitis. Patient was allergy tested 2 years ago which was negative. She has done at least 1 month of nasal steroid spray without relief. She is currently using nasal saline. She is not experiencing any significant postnasal drainage. She is experiencing persistent sinus pressure, dizziness and brain fog.     Past Medical History     Past Medical History:   Diagnosis Date    Abdominal bloating     h pylori pos    Anxiety 03/01/2018    B12 deficiency     COVID-19 12/17/2020    Crohn's disease of small intestine ([facility] 05/2021    Non-allergic rhinitis     saw allergist dr Telma Mac, negative for trees, grasses, molds    Tachycardia 01/28/208    ectopic atrial rhythm, inappropriate sinus tachycardia    Urge incontinence        Past Surgical History     Past Surgical History:   Procedure Laterality Date    COLONOSCOPY  04/29/2021    terminal ileum ulcers, path pdg-possible crohn's    COLPOSCOPY      for ascus - [address] [place] [address]    UPPER GASTROINTESTINAL ENDOSCOPY  04/29/2021    mild erythema-gastric antrum, grade a reflux esophagitis    WISDOM TOOTH EXTRACTION Family History     Family History   Problem Relation Age of Onset    High Blood Pressure Father     Breast Cancer Maternal Aunt     Diabetes type 2  Maternal Grandmother        Social History     Social History     Socioeconomic History    Marital status: Single     Spouse name: Not on file    Number of children: 0    Years of education: Not on file    Highest education level: Not on file   Occupational History    Occupation: reading [place]    Tobacco Use    Smoking status: Never Smoker    Smokeless tobacco: Never Used   Substance and Sexual Activity    Alcohol use: Yes     Comment: 4-5 times per month    Drug use: No    Sexual activity: Yes     Partners: Male     Comment: boyfriend   Other Topics Concern    Not on file   Social History Narrative    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     Social Determinants of Health     Financial Resource Strain:     Difficulty of Paying Living Expenses: Not on file   Food Insecurity:     Worried About Running Out of Food in the Last Year: Not on file    Gala of Food in the Last Year: Not on file   Transportation Needs:     Lack of Transportation (Medical): Not on file    Lack of Transportation (Non-Medical):  Not on file   Physical Activity:     Days of Exercise per Week: Not on file    Minutes of Exercise per Session: Not on file   Stress:     Feeling of Stress : Not on file   Social Connections:     Frequency of Communication with Friends and Family: Not on file    Frequency of Social Gatherings with Friends and Family: Not on file    Attends Latter day Services: Not on file    Active Member of Clubs or Organizations: Not on file    Attends Club or Organization Meetings: Not on file    Marital Status: Not on file   Intimate Partner Violence:     Fear of Current or Ex-Partner: Not on file    Emotionally Abused: Not on file    Physically Abused: Not on file    Sexually Abused: Not on file   Housing Stability:     Unable to Pay for Housing in the Last Year: Not on file    Number of Places Lived in the Last Year: Not on file    Unstable Housing in the Last Year: Not on file       Allergies     Allergies   Allergen Reactions    Augmentin [Amoxicillin-Pot Clavulanate] Nausea And Vomiting       Medications     Current Outpatient Medications   Medication Sig Dispense Refill    fluticasone (FLONASE) 50 MCG/ACT nasal spray INSTILL 2 SPRAYS BY NASAL ROUTE EVERY DAY 16 g 11    pantoprazole (PROTONIX) 40 MG tablet TAKE 1 TABLET BY MOUTH EVERY MORNING      solifenacin (VESICARE) 5 MG tablet Take 5 mg by mouth daily      vitamin B-12 (CYANOCOBALAMIN) 500 MCG tablet Take 1 tablet by mouth daily 30 tablet 3    NEXPLANON 68 MG implant        No current facility-administered medications for this visit. Review of Systems     Review of Systems   Constitutional: Negative for chills, fatigue and fever. HENT: Positive for sinus pressure. Negative for congestion, ear discharge, ear pain, facial swelling, hearing loss, nosebleeds, postnasal drip, rhinorrhea, sinus pain, sneezing, sore throat, tinnitus, trouble swallowing and voice change. Eyes: Negative for photophobia, pain, redness, itching and visual disturbance. Respiratory: Negative for cough, choking, shortness of breath and stridor. Gastrointestinal: Negative for diarrhea and nausea. Musculoskeletal: Negative for neck pain and neck stiffness. Skin: Negative for color change and rash. Neurological: Positive for dizziness and light-headedness. Negative for facial asymmetry. Hematological: Negative for adenopathy. Psychiatric/Behavioral: Negative for agitation and confusion. PhysicalExam     Vitals:    01/04/22 1006   BP: 114/78   Pulse: 94       Physical Exam  Constitutional:       Appearance: She is well-developed. HENT:      Head: Normocephalic and atraumatic. Jaw: No trismus.       Right Ear: Tympanic membrane, ear canal and external ear normal. No drainage. No middle ear effusion. Tympanic membrane is not perforated. Left Ear: Tympanic membrane, ear canal and external ear normal. No drainage. No middle ear effusion. Tympanic membrane is not perforated. Nose: Septal deviation (3+ right) present. No mucosal edema or rhinorrhea. Mouth/Throat:      Dentition: Normal dentition. Pharynx: Uvula midline. No oropharyngeal exudate. Eyes:      General: No scleral icterus. Right eye: No discharge. Left eye: No discharge. Pupils: Pupils are equal, round, and reactive to light. Neck:      Thyroid: No thyromegaly. Trachea: Phonation normal. No tracheal deviation. Pulmonary:      Effort: Pulmonary effort is normal. No respiratory distress. Breath sounds: No stridor. Musculoskeletal:      Cervical back: Neck supple. Lymphadenopathy:      Cervical: No cervical adenopathy. Skin:     General: Skin is warm and dry. Neurological:      Mental Status: She is alert and oriented to person, place, and time. Cranial Nerves: No cranial nerve deficit. Comments: [place]-Hallpike negative bilaterally. Finger-nose normal.  Romberg normal.  Fukuda step test normal.  Cranial nerves II to XII grossly intact. Psychiatric:         Behavior: Behavior normal.           Procedure     Rigid Nasal Endoscopy    Preop: Chronic pansinusitis  Anes: topical lidocaine with afrin  Consent: verbal  Description:  After obtaining verbal consent from the patient 4% lidocaine with afrin was sprayed into the nasal cavities. After allowing a time for anesthesia, a nasal endoscope was placed into the naris. The septum, inferior, and middle turbinates were examined. The middle meatus, and sphenoethmoid recess was examined bilaterally. Mild to moderate mucosal erythema and edema around the middle meatus. 3+ septal deviation to the right. No mucopurulent drainage. Inferior turbinates normal in caliber.   No mass, polyp or lesion. Tolerated well without complication. Assessment and Plan     1. Chronic pansinusitis  The patient underwent an MRI recently which revealed chronic pansinusitis. Images and report independently reviewed. Patient has been experiencing symptoms of sinus pressure, congestion, and brain fog for years now. She has been experiencing the dizziness for the past month. I am concerned that she may have some chronic sinusitis that could be triggering migraine. Provided patient with migraine handout. Balance testing today revealed no peripheral vestibulopathy. We will obtain a CT of the sinuses to further evaluate the nature of the chronic inflammation. Patient may be a candidate for surgical intervention. - CT SINUS WO CONTRAST; Future    2. Sinus headache      3. Dizziness      4. Vestibular migraine        Return for CT. Portions of this note were dictated using Dragon.  There may be linguistic errors secondary to the use of this program.

## 2022-01-31 ENCOUNTER — TELEPHONE (OUTPATIENT)
Dept: CT IMAGING | Age: 27
End: 2022-01-31

## 2022-01-31 NOTE — TELEPHONE ENCOUNTER
----- Message from Quynh Lea DO sent at 1/31/2022  7:44 AM EST -----  Please have [facility] push images to pacs.  thanks

## 2022-02-01 ENCOUNTER — OFFICE VISIT (OUTPATIENT)
Dept: ENT CLINIC | Age: 27
End: 2022-02-01
Payer: COMMERCIAL

## 2022-02-01 VITALS
BODY MASS INDEX: 19.24 KG/M2 | WEIGHT: 98 LBS | HEIGHT: 60 IN | DIASTOLIC BLOOD PRESSURE: 78 MMHG | SYSTOLIC BLOOD PRESSURE: 124 MMHG | HEART RATE: 90 BPM | TEMPERATURE: 98.2 F

## 2022-02-01 DIAGNOSIS — J32.4 CHRONIC PANSINUSITIS: Primary | ICD-10-CM

## 2022-02-01 DIAGNOSIS — J34.89 CONCHA BULLOSA: ICD-10-CM

## 2022-02-01 DIAGNOSIS — R42 DIZZINESS: ICD-10-CM

## 2022-02-01 DIAGNOSIS — G43.809 VESTIBULAR MIGRAINE: ICD-10-CM

## 2022-02-01 PROCEDURE — 99214 OFFICE O/P EST MOD 30 MIN: CPT | Performed by: OTOLARYNGOLOGY

## 2022-02-01 ASSESSMENT — ENCOUNTER SYMPTOMS
TROUBLE SWALLOWING: 0
FACIAL SWELLING: 0
EYE REDNESS: 0
SHORTNESS OF BREATH: 0
EYE ITCHING: 0
COUGH: 0
RHINORRHEA: 0
VOICE CHANGE: 0
SORE THROAT: 0
SINUS PRESSURE: 1
PHOTOPHOBIA: 0
COLOR CHANGE: 0
NAUSEA: 0
DIARRHEA: 0
SINUS PAIN: 0
EYE PAIN: 0
CHOKING: 0
STRIDOR: 0

## 2022-02-01 NOTE — PROGRESS NOTES
[facility]  [address] [address] [facility], [address] [place] [address] Susan  P: [phone]  F: [phone]       Patient     Evette Medina  1995    ChiefComplaint     Chief Complaint   Patient presents with    Follow-up     Patient is here today for the results of her CT Scan       History of Present Illness     Evette Medina is a pleasant 23 y.o. female here for follow-up for chronic sinusitis, dizziness and headache. CT of the sinuses images and report reviewed with the patient. CT reveals chronic mucosal thickening and opacification of bilateral ethmoid sinus Cavities as well as obstruction of bilateral maxillary sinus cavities with some mild mucosal thickening and a left suzi bullosa. Patient continues to experience symptoms of dizziness about twice a week. She continues to experience sinus symptoms. She has failed the use of nasal saline spray, nasal steroids, antihistamines. Her allergy testing in the past was negative.     Past Medical History     Past Medical History:   Diagnosis Date    Abdominal bloating     h pylori pos    Anxiety 03/01/2018    B12 deficiency     COVID-19 12/17/2020    Crohn's disease of small intestine ([facility] [address] 05/2021    Non-allergic rhinitis     saw allergist dr Arlene Walker, negative for trees, grasses, molds    Tachycardia 01/28/208    ectopic atrial rhythm, inappropriate sinus tachycardia    Urge incontinence        Past Surgical History     Past Surgical History:   Procedure Laterality Date    COLONOSCOPY  04/29/2021    terminal ileum ulcers, path pdg-possible crohn's    COLPOSCOPY      for ascus - [address] [place] [address]    UPPER GASTROINTESTINAL ENDOSCOPY  04/29/2021    mild erythema-gastric antrum, grade a reflux esophagitis    WISDOM TOOTH EXTRACTION         Family History     Family History   Problem Relation Age of Onset    High Blood Pressure Father     Breast Cancer Maternal Aunt     Diabetes type 2  Maternal Grandmother        Social History     Social History     Socioeconomic History    Marital status: Single     Spouse name: Not on file    Number of children: 0    Years of education: Not on file    Highest education level: Not on file   Occupational History    Occupation: reading [place]    Tobacco Use    Smoking status: Never Smoker    Smokeless tobacco: Never Used   Substance and Sexual Activity    Alcohol use: Yes     Comment: 4-5 times per month    Drug use: No    Sexual activity: Yes     Partners: Male     Comment: boyfriend   Other Topics Concern    Not on file   Social History Narrative    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     Social Determinants of Health     Financial Resource Strain:     Difficulty of Paying Living Expenses: Not on file   Food Insecurity:     Worried About Running Out of Food in the Last Year: Not on file    Gala of Food in the Last Year: Not on file   Transportation Needs:     Lack of Transportation (Medical): Not on file    Lack of Transportation (Non-Medical):  Not on file   Physical Activity:     Days of Exercise per Week: Not on file    Minutes of Exercise per Session: Not on file   Stress:     Feeling of Stress : Not on file   Social Connections:     Frequency of Communication with Friends and Family: Not on file    Frequency of Social Gatherings with Friends and Family: Not on file    Attends Church Services: Not on file    Active Member of [address] [place] [address] [employer] or Organizations: Not on file    Attends Club or Organization Meetings: Not on file    Marital Status: Not on file   Intimate Partner Violence:     Fear of Current or Ex-Partner: Not on file    Emotionally Abused: Not on file    Physically Abused: Not on file    Sexually Abused: Not on file   Housing Stability:     Unable to Pay for Housing in the Last Year: Not on file    Number of [place] in the Last Year: Not on file    Unstable Housing in the Last Year: Not on file       Allergies     Allergies   Allergen Reactions    Tympanic membrane is not perforated. Nose: No septal deviation, mucosal edema or rhinorrhea. Mouth/Throat:      Dentition: Normal dentition. Pharynx: Uvula midline. No oropharyngeal exudate. Eyes:      General: No scleral icterus. Right eye: No discharge. Left eye: No discharge. Pupils: Pupils are equal, round, and reactive to light. Neck:      Thyroid: No thyromegaly. Trachea: Phonation normal. No tracheal deviation. Pulmonary:      Effort: Pulmonary effort is normal. No respiratory distress. Breath sounds: No stridor. Musculoskeletal:      Cervical back: Neck supple. Lymphadenopathy:      Cervical: No cervical adenopathy. Skin:     General: Skin is warm and dry. Neurological:      Mental Status: She is alert and oriented to person, place, and time. Cranial Nerves: No cranial nerve deficit. Psychiatric:         Behavior: Behavior normal.           Procedure           Assessment and Plan     1. Chronic pansinusitis  CT images and report reviewed. CT reveals chronic maxillary and ethmoid sinusitis as well as left suzi bullosa. I do suspect that her chronic sinus inflammation may be triggering vestibular migraine symptoms. Given her failure of medical therapy, I recommend endoscopic sinus surgery. Risk, benefits and alternatives discussed with the patient. Risk include, but not limited to bleeding, infection, pain, damage to the orbit and blindness, damage to the skull base and CSF leak, epiphora, synechia formation. Patient understands and would like to proceed. We discussed other options of management for the dizziness and vestibular migraine including supplements and possibly low-dose migraine preventative medication such as topiramate or nortriptyline. She elects to proceed with surgery now as it is most likely the instigator for her symptoms. 2. Dizziness      3. Vestibular migraine        Return for 1 week post op.       Portions of this note were dictated using Dragon.  There may be linguistic errors secondary to the use of this program.

## 2022-02-01 NOTE — LETTER
[facility]    Surgery Schedule Request Form: 02/01/22  [address] Kathy Pinto                                                                                      Post op 4/12/22    DATE OF SURGERY: 04/04/2022    TIME OF SURGERY:  8:30 AM            CONF #: ____________________       Patient Information:    Patient name: Jayden Apodaca    YOB: 1995 Age/Sex:26 y.o./female    SS #:[id number]    Wt Readings from Last 1 Encounters:   02/01/22 98 lb (44.5 kg)       Telephone Information:   Mobile [phone]     Home [phone]     Surgeon & Procedure Information:     Lead surgeon: Husam Barclay Co-Surgeon: guru  Phone: [phone] Fax: [phone]  PCP: Fabián Vargas MD    Diagnosis: 1. Chronic pansinusitis   J32.4   2. Suzi bullosa   J34.89    Procedure name/CPT: Excision suiz bullosa left (52091), Bilateral Maxillary Antrostomy (06169-28) and Bilateral Total Ethmoidectomy (24870-40)    Postop CPT: Nasal endoscopy with sinus debridement bilateral x2 (03056-63 x2)    Procedure length: 1 hour Anesthesia: General    Special Equipment: no    Patient Status: SDS (OP)    Primary Payor Plan: BC/BS  Member ID: [id number]   Subscriber name: Jayden Apodaca    COVID Vaccinated? Yes    [] Implement attached clinical orders for patient.       Electronically signed by Beth Lopes DO on 2/1/2022 at 11:10 AM

## 2022-02-08 ENCOUNTER — TELEPHONE (OUTPATIENT)
Dept: ENT CLINIC | Age: 27
End: 2022-02-08

## 2022-02-10 ENCOUNTER — TELEPHONE (OUTPATIENT)
Dept: ENT CLINIC | Age: 27
End: 2022-02-10

## 2022-02-14 ENCOUNTER — TELEPHONE (OUTPATIENT)
Dept: ENT CLINIC | Age: 27
End: 2022-02-14

## 2022-02-21 ENCOUNTER — TELEPHONE (OUTPATIENT)
Dept: ENT CLINIC | Age: 27
End: 2022-02-21

## 2022-02-21 NOTE — TELEPHONE ENCOUNTER
Mikel Maria. please call the patients regarding paperwork needed time off work.  Her surgery is scheduled for 4/4/22.   [phone]

## 2022-04-01 ENCOUNTER — OFFICE VISIT (OUTPATIENT)
Dept: INTERNAL MEDICINE CLINIC | Age: 27
End: 2022-04-01
Payer: COMMERCIAL

## 2022-04-01 VITALS
OXYGEN SATURATION: 98 % | DIASTOLIC BLOOD PRESSURE: 71 MMHG | WEIGHT: 103.2 LBS | BODY MASS INDEX: 20.15 KG/M2 | SYSTOLIC BLOOD PRESSURE: 112 MMHG | HEART RATE: 92 BPM

## 2022-04-01 DIAGNOSIS — Z11.59 SCREENING FOR VIRAL DISEASE: ICD-10-CM

## 2022-04-01 DIAGNOSIS — R42 DIZZINESS: ICD-10-CM

## 2022-04-01 DIAGNOSIS — Z01.818 PREOP EXAMINATION: Primary | ICD-10-CM

## 2022-04-01 PROBLEM — J32.9 CHRONIC SINUSITIS: Status: ACTIVE | Noted: 2022-04-01

## 2022-04-01 PROCEDURE — 99213 OFFICE O/P EST LOW 20 MIN: CPT | Performed by: INTERNAL MEDICINE

## 2022-04-01 RX ORDER — NITROFURANTOIN 25; 75 MG/1; MG/1
100 CAPSULE ORAL PRN
COMMUNITY
Start: 2021-12-27

## 2022-04-01 RX ORDER — VIBEGRON 75 MG/1
75 TABLET, FILM COATED ORAL
COMMUNITY
Start: 2022-03-22

## 2022-04-01 RX ORDER — METHENAMINE, SODIUM PHOSPHATE, MONOBASIC, ANHYDROUS, PHENYL SALICYLATE, METHYLENE BLUE AND HYOSCYAMINE SULFATE 118; 40.8; 36; 10; .12 MG/1; MG/1; MG/1; MG/1; MG/1
CAPSULE ORAL
COMMUNITY
Start: 2022-02-24 | End: 2022-07-18 | Stop reason: ALTCHOICE

## 2022-04-01 SDOH — ECONOMIC STABILITY: FOOD INSECURITY: WITHIN THE PAST 12 MONTHS, YOU WORRIED THAT YOUR FOOD WOULD RUN OUT BEFORE YOU GOT MONEY TO BUY MORE.: NEVER TRUE

## 2022-04-01 SDOH — ECONOMIC STABILITY: FOOD INSECURITY: WITHIN THE PAST 12 MONTHS, THE FOOD YOU BOUGHT JUST DIDN'T LAST AND YOU DIDN'T HAVE MONEY TO GET MORE.: NEVER TRUE

## 2022-04-01 ASSESSMENT — ENCOUNTER SYMPTOMS
NAUSEA: 1
TROUBLE SWALLOWING: 0
COUGH: 0
SORE THROAT: 0
SHORTNESS OF BREATH: 0
RHINORRHEA: 0
ABDOMINAL PAIN: 0
DIARRHEA: 0

## 2022-04-01 ASSESSMENT — PATIENT HEALTH QUESTIONNAIRE - PHQ9
SUM OF ALL RESPONSES TO PHQ QUESTIONS 1-9: 1
SUM OF ALL RESPONSES TO PHQ QUESTIONS 1-9: 1
SUM OF ALL RESPONSES TO PHQ9 QUESTIONS 1 & 2: 1
DEPRESSION UNABLE TO ASSESS: PT REFUSES
2. FEELING DOWN, DEPRESSED OR HOPELESS: 1
SUM OF ALL RESPONSES TO PHQ QUESTIONS 1-9: 1
SUM OF ALL RESPONSES TO PHQ QUESTIONS 1-9: 1
1. LITTLE INTEREST OR PLEASURE IN DOING THINGS: 0

## 2022-04-01 ASSESSMENT — SOCIAL DETERMINANTS OF HEALTH (SDOH): HOW HARD IS IT FOR YOU TO PAY FOR THE VERY BASICS LIKE FOOD, HOUSING, MEDICAL CARE, AND HEATING?: NOT HARD AT ALL

## 2022-04-01 NOTE — PROGRESS NOTES
Preoperative Consultation      Alejandro Hodge  YOB: 1995    Date of Service:  4/1/2022    Vitals:    04/01/22 1315   BP: 112/71   Pulse: 92   SpO2: 98%   Weight: 103 lb 3.2 oz (46.8 kg)           Chief Complaint   Patient presents with    Pre-op Exam         HPI:    This patient presents to the office today for a preoperative consultation at the request of surgeon, Dr. Derik Hsieh, who plans on performing [facility] LEFT/ BILATERAL MAXILLARY ANTROSTOMY AND BILATERAL TOTAL ETHMOIDECTOMY on April 25 at [place] [address].      Planned anesthesia: General   Known anesthesia problems: None   Bleeding risk: No recent or remote history of abnormal bleeding (patient has mild crohn's disease)  Personal or FH of DVT/PE: No   Recent steroid use: no  Exercise tolerance: greater than 4 METs   Patient objection to receiving blood products: No      Past Medical History:   Diagnosis Date    Abdominal bloating     h pylori pos    Anxiety 03/01/2018    B12 deficiency     Chronic sinusitis     COVID-19 12/17/2020    Crohn's disease of small intestine ([address] 05/2021    GERD (gastroesophageal reflux disease)     Non-allergic rhinitis     saw allergist dr Jessica Lacy, negative for trees, grasses, molds    Tachycardia 01/28/208    ectopic atrial rhythm, inappropriate sinus tachycardia    Urge incontinence     interstial cystitis - dr Jenaro pineda- urogyn-pelvic floor rehab     Past Surgical History:   Procedure Laterality Date    COLONOSCOPY  04/29/2021    terminal ileum ulcers, path pdg-possible crohn's    COLPOSCOPY      for ascus - [address] [place] [address]    CYSTOSCOPY      UPPER GASTROINTESTINAL ENDOSCOPY  04/29/2021    mild erythema-gastric antrum, grade a reflux esophagitis    WISDOM TOOTH EXTRACTION       Family History   Problem Relation Age of Onset    High Blood Pressure Father     Breast Cancer Maternal Aunt     Diabetes type 2  Maternal Grandmother      Social History     Tobacco Use    Smoking status: Never Smoker    Smokeless tobacco: Never Used   Substance Use Topics    Alcohol use: Yes     Comment: 4-5 times per month    Drug use: No       Outpatient Medications Marked as Taking for the 4/1/22 encounter (Office Visit) with Nelida Peguero MD   Medication Sig Dispense Refill    Vibegron (GEMTESA) 75 MG TABS 75 mg      Meth-Hyo-M Bl-Na Phos-Ph Sal (URO-MP) 118 MG CAPS TAKE 118 MG BY MOUTH 4 (FOUR) TIMES DAILY.  nitrofurantoin, macrocrystal-monohydrate, (MACROBID) 100 MG capsule Take 100 mg by mouth as needed      D-Mannose 500 MG CAPS Take by mouth      fluticasone (FLONASE) 50 MCG/ACT nasal spray INSTILL 2 SPRAYS BY NASAL ROUTE EVERY DAY 16 g 11    pantoprazole (PROTONIX) 40 MG tablet TAKE 1 TABLET BY MOUTH EVERY MORNING      vitamin B-12 (CYANOCOBALAMIN) 500 MCG tablet Take 1 tablet by mouth daily 30 tablet 3    NEXPLANON 68 MG implant      FD-guard    Allergies   Allergen Reactions    Augmentin [Amoxicillin-Pot Clavulanate] Nausea And Vomiting       Review of Systems   Constitutional: Negative for fatigue and fever. HENT: Negative for rhinorrhea, sore throat and trouble swallowing. Eyes: Negative for visual disturbance. Respiratory: Negative for cough and shortness of breath. Cardiovascular: Negative for chest pain and palpitations. Gastrointestinal: Positive for nausea (mild). Negative for abdominal pain and diarrhea. Bloating    Genitourinary: Negative for decreased urine volume, dysuria and frequency. Musculoskeletal: Negative for arthralgias and myalgias. Skin: Negative for rash. Allergic/Immunologic: Negative for immunocompromised state. Neurological: Positive for light-headedness. Negative for dizziness, numbness and headaches. Hematological: Does not bruise/bleed easily. Psychiatric/Behavioral: Negative for dysphoric mood and sleep disturbance. The patient is not nervous/anxious.          Physical Exam  Vitals and nursing note reviewed. Constitutional:       General: She is not in acute distress. Appearance: She is well-developed. HENT:      Head: Normocephalic and atraumatic. Right Ear: External ear normal.      Left Ear: External ear normal.      Nose: Nose normal.   Eyes:      General: No scleral icterus. Pupils: Pupils are equal, round, and reactive to light. Neck:      Thyroid: No thyromegaly. Cardiovascular:      Rate and Rhythm: Normal rate and regular rhythm. Heart sounds: No murmur heard. Pulmonary:      Effort: No respiratory distress. Breath sounds: Normal breath sounds. No wheezing or rales. Abdominal:      General: Bowel sounds are normal. There is no distension. Palpations: Abdomen is soft. Tenderness: There is no abdominal tenderness. Musculoskeletal:         General: Normal range of motion. Lymphadenopathy:      Cervical: No cervical adenopathy. Skin:     General: Skin is warm and dry. Neurological:      Mental Status: She is alert and oriented to person, place, and time. Cranial Nerves: No cranial nerve deficit. Sensory: No sensory deficit. Coordination: Coordination normal.   Psychiatric:         Behavior: Behavior normal.                Assessment/Plan:     1. Preop examination    - Protime-INR; Future  - APTT; Future  - Comprehensive Metabolic Panel; Future  - CBC;  Future     Pre-Operative Risk assessment using 2014 ACC/AHA guidelines     Emergent procedure No  Active Cardiac Condition No (decompensated HF, Arrhythmia, MI <3 weeks, severe valve disease)  Risk Level of Procedure Intermediate Risk (intraperitoneal, intrathoracic, HENT, orthopedic, or carotid endarterectomy, etc.)  Revised Cardiac Risk Index Risk factors: None  Measurement of Exercise Tolerance before Surgery >4 Yes    According to the 2014 ACC/AHA pre-operative risk assessment guidelines [place] is a low risk for major cardiac complications during a intermediate risk procedure and may continue as planned. Specific medication recommendations are listed below. Stop vitamins and supplements for one week prior to surgery.

## 2022-04-13 NOTE — PROGRESS NOTES
PRE-OP INSTRUCTIONS FOR THE SURGICAL PATIENT YOU ARE UNABLE TO MAKE CONTACT FOR AN INTERVIEW:        1. Follow all instructions provided to you from your surgeons office, including your ARRIVAL TIME. 2. Enter the MAIN entrance located on [employer] and report to the desk. 3. Bring your insurance & photo ID with you. You may also be asked to pay a co-pay, as you may want to bring a check or credit card with you. 4. Leave all other valuables at home. 5. Arrange for someone to drive you home and be with you for the first 24 hours after discharge. 6. Bring your medication list with you day of surgery with doses and frequency listed (including over the counter medications)  7. You must contact your surgeon for Instructions regarding:              - ALL medication instructions, especially if taking blood thinners, aspirin, or diabetic medication.         -Bariatric patients call surgeon if on diabetic medications as some need to be stopped 1 week preop  - IF  there is a change in your physical condition such as a cold, fever, rash, cuts, sores or any other infection, especially near your surgical site. 8. A Pre-op History and Physical for surgery MUST be completed by your Physician or an Urgent Care within 30 days of your procedure date. Please bring a copy with you on the day of your procedure and along with any other testing performed. 9. DO NOT EAT ANYTHING eight hours prior to arrival for surgery. May have up to 8 ounces of water 4 hours prior to arrival for surgery. NOTE: ALL Gastric, Bariatric and Bowel surgery patients MUST follow their surgeon's instructions. 10. No gum, candy, mints, or ice chips day of procedure. 11. Please refrain from drinking alcohol the day before or day of your procedure. 12. Please do not smoke the day of your procedure. 13. Dress in loose, comfortable clothing appropriate for redressing after your procedure.  Do not wear jewelry (including body piercings), make-up, fingernail polish, lotion, powders or metal hairclips. 15. Contacts will need to be removed prior to surgery. You may want to bring your eye glasses to wear immediately before and after surgery. 14. Dentures will need to be removed before your procedure. 13. Bring cases for your glasses, contacts, dentures, or hearing aids to protect them while you are in surgery. 16. If you use a CPAP, please bring it with you on the day of your procedure. 17. Do not shave or wax for 72 hours prior to procedure near your operative site  18. FOR WOMAN OF CHILDBEARING AGE ONLY- please make sure we can collect a urine sample on arrival.     If you have further questions, you may contact your surgeon's office or us at [phone]     Left instructions on patient's voicemail.     Donovan Aquino RN.4/13/2022 .10:38 AM

## 2022-04-13 NOTE — PROGRESS NOTES
Place patient label inside box (if no patient label, complete below)  Name:  :  MR#:   Chetan Bernal / PROCEDURE  1. I (we), Tania Godoy  authorize DR Lionel Hi  and/or such assistants as may be selected by him/her, to perform the following operation/procedure(s): EXCISION ALBINO BULLOSA LEFT/ BILATERAL MAXILLARY ANTROSTOMY AND BILATERAL TOTAL ETHMOIDECTOMY        Note: If unable to obtain consent prior to an emergent procedure, document the emergent reason in the medical record. This procedure has been explained to my (our) satisfaction and included in the explanation was:  A) The intended benefit, nature, and extent of the procedure to be performed;  B) The significant risks involved and the probability of success;  C) Alternative procedures and methods of treatment;  D) The dangers and probable consequences of such alternatives (including no procedure or treatment); E) The expected consequences of the procedure on my future health;  F) Whether other qualified individuals would be performing important surgical tasks and/or whether  would be present to advise or support the procedure. I (we) understand that there are other risks of infection and other serious complications in the pre-operative/procedural and postoperative/procedural stages of my (our) care. I (we) have asked all of the questions which I (we) thought were important in deciding whether or not to undergo treatment or diagnosis. These questions have been answered to my (our) satisfaction. I (we) understand that no assurance can be given that the procedure will be a success, and no guarantee or warranty of success has been given to me (us).     2. It has been explained to me (us) that during the course of the operation/procedure, unforeseen conditions may be revealed that necessitate extension of the original procedure(s) or different procedure(s) than those set forth in Paragraph 1. I (we) authorize and request that the above-named physician, his/her assistants or his/her designees, perform procedures as necessary and desirable if deemed to be in my (our) best interest.     Revised 8/2/2021                                                                          Page 1 of 2   I acknowledge that health care personnel may be observing this procedure for the purpose of medical education or other specified purposes as may be necessary as requested and/or approved by my (our) physician. 3. I (we) consent to the disposal by the hospital Pathologist of the removed tissue, parts or organs in accordance with hospital policy. 4. I do ____ do not ____ consent to the use of a local infiltration pain blocking agent that will be used by my provider/surgical provider to help alleviate pain during my procedure. 5. I do ____ do not ____ consent to an emergent blood transfusion in the case of a life-threatening situation that requires blood components to be administered. This consent is valid for 24 hours from the beginning of the procedure. 6. This patient does ____ or does not ____ currently have a DNR status/order. If DNR order is in place, obtain Addendum to the Surgical Consent for ALL Patients with a DNR Order to address tommy-operative status for limited intervention or DNR suspension.      7. I have read and fully understand the above Consent for Operation/Procedure and that all blanks were completed before I signed the consent.   _____________________________       _____________________      ____/____am/pm  Signature of Patient or legal representative      Printed Name / Relationship            Date / Time   ____________________________       _____________________      ____/____am/pm  Witness to Signature                                    Printed Name                    Date / Time     If patient is unable to sign or is a minor, complete the following)  Patient is a minor, ____ years of age, or unable to sign because:   ______________________________________________________________________________________________    [place] If a phone consent is obtained, consent will be documented by using two health care professionals, each affirming that the consenting party has no questions and gives consent for the procedure discussed with the physician/provider.   _____________________          ____________________       _____/_____am/pm   2nd witness to phone consent        Printed name           Date / Time    Informed Consent:  I have provided the explanation described above in section 1 to the patient and/or legal representative.  I have provided the patient and/or legal representative with an opportunity to ask any questions about the proposed operation/procedure.   ___________________________          ____________________         ____/____am/pm  Provider / Proceduralist                            Printed name            Date / Time  Revised 8/2/2021                                                                      Page 2 of 2

## 2022-04-13 NOTE — PROGRESS NOTES
Place patient label inside box (if no patient label, complete below)  Name:  :  MR#:   Daniella Gaston / PROCEDURE  1. I (we), [facility] authorize DR Royce Parada  and/or such assistants as may be selected by him/her, to perform the following operation/procedure(s): EXCISION ALBINO BULLOSA LEFT/ BILATERAL MAXILLARY ANTROSTOMY AND BILATERAL TOTAL ETHMOIDECTOMY        Note: If unable to obtain consent prior to an emergent procedure, document the emergent reason in the medical record. This procedure has been explained to my (our) satisfaction and included in the explanation was:  A) The intended benefit, nature, and extent of the procedure to be performed;  B) The significant risks involved and the probability of success;  C) Alternative procedures and methods of treatment;  D) The dangers and probable consequences of such alternatives (including no procedure or treatment); E) The expected consequences of the procedure on my future health;  F) Whether other qualified individuals would be performing important surgical tasks and/or whether  would be present to advise or support the procedure. I (we) understand that there are other risks of infection and other serious complications in the pre-operative/procedural and postoperative/procedural stages of my (our) care. I (we) have asked all of the questions which I (we) thought were important in deciding whether or not to undergo treatment or diagnosis. These questions have been answered to my (our) satisfaction. I (we) understand that no assurance can be given that the procedure will be a success, and no guarantee or warranty of success has been given to me (us).     2. It has been explained to me (us) that during the course of the operation/procedure, unforeseen conditions may be revealed that necessitate extension of the original procedure(s) or different procedure(s) than those set forth in Paragraph 1. I (we) authorize and request that the above-named physician, his/her assistants or his/her designees, perform procedures as necessary and desirable if deemed to be in my (our) best interest.   Revised 8/2/2021                                                                          Page 1 of 2     3.  acknowledge that health care personnel may be observing this procedure for the purpose of medical education or other specified purposes as may be necessary as requested and/or approved by my (our) physician. 4. I (we) consent to the disposal by the hospital Pathologist of the removed tissue, parts or organs in accordance with hospital policy. 5. I do ____ do not ____ consent to the use of a local infiltration pain blocking agent that will be used by my provider/surgical provider to help alleviate pain during my procedure. 6. I do ____ do not ____ consent to an emergent blood transfusion in the case of a life-threatening situation that requires blood components to be administered. This consent is valid for 24 hours from the beginning of the procedure. 7. This patient does ____ or does not ____ currently have a DNR status/order. If DNR order is in place, obtain Addendum to the Surgical Consent for ALL Patients with a DNR Order to address tommy-operative status for limited intervention or DNR suspension.    8. I have read and fully understand the above Consent for Operation/Procedure and that all blanks were completed before I signed the consent.   _____________________________       _____________________      ____/____am/pm  Signature of Patient or legal representative      Printed Name / Relationship            Date / Time   ____________________________       _____________________      ____/____am/pm  Witness to Signature                                    Printed Name                    Date / Time     If patient is unable to sign or is a minor, complete the following)  Patient is a minor, ____ years of age, or unable to sign because:   ______________________________________________________________________________________________    [place] If a phone consent is obtained, consent will be documented by using two health care professionals, each affirming that the consenting party has no questions and gives consent for the procedure discussed with the physician/provider.   _____________________          ____________________       _____/_____am/pm  2nd witness to phone consent        Printed name           Date / Time    Informed Consent:  I have provided the explanation described above in section 1 to the patient and/or legal representative.  I have provided the patient and/or legal representative with an opportunity to ask any questions about the proposed operation/procedure.   ___________________________          ____________________         ____/____am/pm  Provider / Proceduralist                            Printed name            Date / Time  Revised 8/2/2021                                                                      Page 2 of 2

## 2022-04-21 ENCOUNTER — TELEPHONE (OUTPATIENT)
Dept: ENT CLINIC | Age: 27
End: 2022-04-21

## 2022-04-21 NOTE — TELEPHONE ENCOUNTER
Patient calling with the following questions re: upcoming surgery:  1) Wants to know what post op instructions  She has 2 sheets were given with different information regarding changing dressing. 2) How much nasal rinse will patient need to have on hand. She has a box of neilmed nasal rinse. Also, does she need to use it prior to her surg. Please call patient. Says okay to leave on vm.

## 2022-04-22 ENCOUNTER — TELEPHONE (OUTPATIENT)
Dept: ENT CLINIC | Age: 27
End: 2022-04-22

## 2022-04-22 ENCOUNTER — ANESTHESIA EVENT (OUTPATIENT)
Dept: OPERATING ROOM | Age: 27
End: 2022-04-22
Payer: COMMERCIAL

## 2022-04-25 ENCOUNTER — HOSPITAL ENCOUNTER (OUTPATIENT)
Age: 27
Setting detail: OUTPATIENT SURGERY
Discharge: HOME | End: 2022-04-25
Attending: OTOLARYNGOLOGY | Admitting: OTOLARYNGOLOGY
Payer: COMMERCIAL

## 2022-04-25 ENCOUNTER — ANESTHESIA (OUTPATIENT)
Dept: OPERATING ROOM | Age: 27
End: 2022-04-25
Payer: COMMERCIAL

## 2022-04-25 VITALS
BODY MASS INDEX: 20.18 KG/M2 | HEIGHT: 60 IN | TEMPERATURE: 97.5 F | HEART RATE: 87 BPM | OXYGEN SATURATION: 97 % | RESPIRATION RATE: 20 BRPM | WEIGHT: 102.8 LBS | SYSTOLIC BLOOD PRESSURE: 130 MMHG | DIASTOLIC BLOOD PRESSURE: 81 MMHG

## 2022-04-25 VITALS — SYSTOLIC BLOOD PRESSURE: 113 MMHG | DIASTOLIC BLOOD PRESSURE: 65 MMHG | OXYGEN SATURATION: 100 % | TEMPERATURE: 96.6 F

## 2022-04-25 DIAGNOSIS — J34.89 CONCHA BULLOSA: ICD-10-CM

## 2022-04-25 DIAGNOSIS — G89.18 ACUTE POST-OPERATIVE PAIN: Primary | ICD-10-CM

## 2022-04-25 DIAGNOSIS — J32.4 CHRONIC PANSINUSITIS: ICD-10-CM

## 2022-04-25 LAB
APTT: 33 SEC (ref 26.2–38.6)
INR BLD: 0.97 (ref 0.88–1.12)
PREGNANCY, URINE: NEGATIVE
PROTHROMBIN TIME: 10.9 SEC (ref 9.9–12.7)

## 2022-04-25 PROCEDURE — 88305 TISSUE EXAM BY PATHOLOGIST: CPT

## 2022-04-25 PROCEDURE — 31240 NSL/SNS NDSC CNCH BULL RESCJ: CPT | Performed by: OTOLARYNGOLOGY

## 2022-04-25 PROCEDURE — 2720000010 HC SURG SUPPLY STERILE: Performed by: OTOLARYNGOLOGY

## 2022-04-25 PROCEDURE — 2580000003 HC RX 258: Performed by: FAMILY MEDICINE

## 2022-04-25 PROCEDURE — 6370000000 HC RX 637 (ALT 250 FOR IP): Performed by: OTOLARYNGOLOGY

## 2022-04-25 PROCEDURE — 6360000002 HC RX W HCPCS: Performed by: ANESTHESIOLOGY

## 2022-04-25 PROCEDURE — 7100000010 HC PHASE II RECOVERY - FIRST 15 MIN: Performed by: OTOLARYNGOLOGY

## 2022-04-25 PROCEDURE — 3600000004 HC SURGERY LEVEL 4 BASE: Performed by: OTOLARYNGOLOGY

## 2022-04-25 PROCEDURE — 3700000000 HC ANESTHESIA ATTENDED CARE: Performed by: OTOLARYNGOLOGY

## 2022-04-25 PROCEDURE — 2500000003 HC RX 250 WO HCPCS: Performed by: OTOLARYNGOLOGY

## 2022-04-25 PROCEDURE — 6360000002 HC RX W HCPCS: Performed by: NURSE ANESTHETIST, CERTIFIED REGISTERED

## 2022-04-25 PROCEDURE — 7100000011 HC PHASE II RECOVERY - ADDTL 15 MIN: Performed by: OTOLARYNGOLOGY

## 2022-04-25 PROCEDURE — 7100000000 HC PACU RECOVERY - FIRST 15 MIN: Performed by: OTOLARYNGOLOGY

## 2022-04-25 PROCEDURE — 2709999900 HC NON-CHARGEABLE SUPPLY: Performed by: OTOLARYNGOLOGY

## 2022-04-25 PROCEDURE — 85610 PROTHROMBIN TIME: CPT

## 2022-04-25 PROCEDURE — 31256 EXPLORATION MAXILLARY SINUS: CPT | Performed by: OTOLARYNGOLOGY

## 2022-04-25 PROCEDURE — 3700000001 HC ADD 15 MINUTES (ANESTHESIA): Performed by: OTOLARYNGOLOGY

## 2022-04-25 PROCEDURE — 84703 CHORIONIC GONADOTROPIN ASSAY: CPT

## 2022-04-25 PROCEDURE — 31255 NSL/SINS NDSC W/TOT ETHMDCT: CPT | Performed by: OTOLARYNGOLOGY

## 2022-04-25 PROCEDURE — 3600000014 HC SURGERY LEVEL 4 ADDTL 15MIN: Performed by: OTOLARYNGOLOGY

## 2022-04-25 PROCEDURE — 2500000003 HC RX 250 WO HCPCS: Performed by: NURSE ANESTHETIST, CERTIFIED REGISTERED

## 2022-04-25 PROCEDURE — 7100000001 HC PACU RECOVERY - ADDTL 15 MIN: Performed by: OTOLARYNGOLOGY

## 2022-04-25 PROCEDURE — 85730 THROMBOPLASTIN TIME PARTIAL: CPT

## 2022-04-25 RX ORDER — OXYCODONE HYDROCHLORIDE 5 MG/1
10 TABLET ORAL PRN
Status: DISCONTINUED | OUTPATIENT
Start: 2022-04-25 | End: 2022-04-25 | Stop reason: HOSPADM

## 2022-04-25 RX ORDER — OXYMETAZOLINE HYDROCHLORIDE 0.05 G/100ML
SPRAY NASAL PRN
Status: DISCONTINUED | OUTPATIENT
Start: 2022-04-25 | End: 2022-04-25 | Stop reason: HOSPADM

## 2022-04-25 RX ORDER — SODIUM CHLORIDE 9 MG/ML
INJECTION, SOLUTION INTRAVENOUS PRN
Status: DISCONTINUED | OUTPATIENT
Start: 2022-04-25 | End: 2022-04-25 | Stop reason: HOSPADM

## 2022-04-25 RX ORDER — SODIUM CHLORIDE 0.9 % (FLUSH) 0.9 %
5-40 SYRINGE (ML) INJECTION EVERY 12 HOURS SCHEDULED
Status: DISCONTINUED | OUTPATIENT
Start: 2022-04-25 | End: 2022-04-25 | Stop reason: HOSPADM

## 2022-04-25 RX ORDER — DIPHENHYDRAMINE HYDROCHLORIDE 50 MG/ML
12.5 INJECTION INTRAMUSCULAR; INTRAVENOUS
Status: DISCONTINUED | OUTPATIENT
Start: 2022-04-25 | End: 2022-04-25 | Stop reason: HOSPADM

## 2022-04-25 RX ORDER — LABETALOL HYDROCHLORIDE 5 MG/ML
10 INJECTION, SOLUTION INTRAVENOUS
Status: DISCONTINUED | OUTPATIENT
Start: 2022-04-25 | End: 2022-04-25 | Stop reason: HOSPADM

## 2022-04-25 RX ORDER — HYDROCODONE BITARTRATE AND ACETAMINOPHEN 5; 325 MG/1; MG/1
1 TABLET ORAL EVERY 6 HOURS PRN
Qty: 20 TABLET | Refills: 0 | Status: SHIPPED | OUTPATIENT
Start: 2022-04-25 | End: 2022-04-30

## 2022-04-25 RX ORDER — PROPOFOL 10 MG/ML
INJECTION, EMULSION INTRAVENOUS PRN
Status: DISCONTINUED | OUTPATIENT
Start: 2022-04-25 | End: 2022-04-25 | Stop reason: SDUPTHER

## 2022-04-25 RX ORDER — SODIUM CHLORIDE 0.9 % (FLUSH) 0.9 %
5-40 SYRINGE (ML) INJECTION PRN
Status: DISCONTINUED | OUTPATIENT
Start: 2022-04-25 | End: 2022-04-25 | Stop reason: HOSPADM

## 2022-04-25 RX ORDER — ONDANSETRON 2 MG/ML
INJECTION INTRAMUSCULAR; INTRAVENOUS PRN
Status: DISCONTINUED | OUTPATIENT
Start: 2022-04-25 | End: 2022-04-25 | Stop reason: SDUPTHER

## 2022-04-25 RX ORDER — MEPERIDINE HYDROCHLORIDE 25 MG/ML
12.5 INJECTION INTRAMUSCULAR; INTRAVENOUS; SUBCUTANEOUS EVERY 5 MIN PRN
Status: DISCONTINUED | OUTPATIENT
Start: 2022-04-25 | End: 2022-04-25 | Stop reason: HOSPADM

## 2022-04-25 RX ORDER — PHENYLEPHRINE HYDROCHLORIDE 10 MG/ML
INJECTION INTRAVENOUS PRN
Status: DISCONTINUED | OUTPATIENT
Start: 2022-04-25 | End: 2022-04-25 | Stop reason: SDUPTHER

## 2022-04-25 RX ORDER — HYDRALAZINE HYDROCHLORIDE 20 MG/ML
10 INJECTION INTRAMUSCULAR; INTRAVENOUS
Status: DISCONTINUED | OUTPATIENT
Start: 2022-04-25 | End: 2022-04-25 | Stop reason: HOSPADM

## 2022-04-25 RX ORDER — SODIUM CHLORIDE, SODIUM LACTATE, POTASSIUM CHLORIDE, CALCIUM CHLORIDE 600; 310; 30; 20 MG/100ML; MG/100ML; MG/100ML; MG/100ML
INJECTION, SOLUTION INTRAVENOUS CONTINUOUS
Status: DISCONTINUED | OUTPATIENT
Start: 2022-04-25 | End: 2022-04-25 | Stop reason: HOSPADM

## 2022-04-25 RX ORDER — ROCURONIUM BROMIDE 10 MG/ML
INJECTION, SOLUTION INTRAVENOUS PRN
Status: DISCONTINUED | OUTPATIENT
Start: 2022-04-25 | End: 2022-04-25 | Stop reason: SDUPTHER

## 2022-04-25 RX ORDER — OXYCODONE HYDROCHLORIDE 5 MG/1
5 TABLET ORAL PRN
Status: DISCONTINUED | OUTPATIENT
Start: 2022-04-25 | End: 2022-04-25 | Stop reason: HOSPADM

## 2022-04-25 RX ORDER — SODIUM CHLORIDE 9 MG/ML
25 INJECTION, SOLUTION INTRAVENOUS PRN
Status: DISCONTINUED | OUTPATIENT
Start: 2022-04-25 | End: 2022-04-25 | Stop reason: HOSPADM

## 2022-04-25 RX ORDER — METOCLOPRAMIDE HYDROCHLORIDE 5 MG/ML
10 INJECTION INTRAMUSCULAR; INTRAVENOUS
Status: DISCONTINUED | OUTPATIENT
Start: 2022-04-25 | End: 2022-04-25 | Stop reason: HOSPADM

## 2022-04-25 RX ORDER — FENTANYL CITRATE 50 UG/ML
INJECTION, SOLUTION INTRAMUSCULAR; INTRAVENOUS PRN
Status: DISCONTINUED | OUTPATIENT
Start: 2022-04-25 | End: 2022-04-25 | Stop reason: SDUPTHER

## 2022-04-25 RX ORDER — DEXAMETHASONE SODIUM PHOSPHATE 4 MG/ML
INJECTION, SOLUTION INTRA-ARTICULAR; INTRALESIONAL; INTRAMUSCULAR; INTRAVENOUS; SOFT TISSUE PRN
Status: DISCONTINUED | OUTPATIENT
Start: 2022-04-25 | End: 2022-04-25 | Stop reason: SDUPTHER

## 2022-04-25 RX ORDER — ESMOLOL HYDROCHLORIDE 10 MG/ML
INJECTION INTRAVENOUS PRN
Status: DISCONTINUED | OUTPATIENT
Start: 2022-04-25 | End: 2022-04-25 | Stop reason: SDUPTHER

## 2022-04-25 RX ORDER — MIDAZOLAM HYDROCHLORIDE 1 MG/ML
INJECTION INTRAMUSCULAR; INTRAVENOUS PRN
Status: DISCONTINUED | OUTPATIENT
Start: 2022-04-25 | End: 2022-04-25 | Stop reason: SDUPTHER

## 2022-04-25 RX ORDER — LIDOCAINE HYDROCHLORIDE AND EPINEPHRINE 10; 10 MG/ML; UG/ML
INJECTION, SOLUTION INFILTRATION; PERINEURAL PRN
Status: DISCONTINUED | OUTPATIENT
Start: 2022-04-25 | End: 2022-04-25 | Stop reason: HOSPADM

## 2022-04-25 RX ORDER — LIDOCAINE HYDROCHLORIDE 20 MG/ML
INJECTION, SOLUTION INTRAVENOUS PRN
Status: DISCONTINUED | OUTPATIENT
Start: 2022-04-25 | End: 2022-04-25 | Stop reason: SDUPTHER

## 2022-04-25 RX ADMIN — SODIUM CHLORIDE, POTASSIUM CHLORIDE, SODIUM LACTATE AND CALCIUM CHLORIDE: 600; 310; 30; 20 INJECTION, SOLUTION INTRAVENOUS at 09:54

## 2022-04-25 RX ADMIN — FENTANYL CITRATE 100 MCG: 50 INJECTION, SOLUTION INTRAMUSCULAR; INTRAVENOUS at 08:57

## 2022-04-25 RX ADMIN — HYDROMORPHONE HYDROCHLORIDE 0.5 MG: 1 INJECTION, SOLUTION INTRAMUSCULAR; INTRAVENOUS; SUBCUTANEOUS at 10:49

## 2022-04-25 RX ADMIN — SODIUM CHLORIDE, POTASSIUM CHLORIDE, SODIUM LACTATE AND CALCIUM CHLORIDE: 600; 310; 30; 20 INJECTION, SOLUTION INTRAVENOUS at 07:53

## 2022-04-25 RX ADMIN — ROCURONIUM BROMIDE 40 MG: 10 INJECTION INTRAVENOUS at 08:57

## 2022-04-25 RX ADMIN — PROPOFOL 150 MG: 10 INJECTION, EMULSION INTRAVENOUS at 08:57

## 2022-04-25 RX ADMIN — ESMOLOL HYDROCHLORIDE 10 MG: 10 INJECTION, SOLUTION INTRAVENOUS at 09:12

## 2022-04-25 RX ADMIN — ONDANSETRON 4 MG: 2 INJECTION INTRAMUSCULAR; INTRAVENOUS at 09:06

## 2022-04-25 RX ADMIN — DEXAMETHASONE SODIUM PHOSPHATE 8 MG: 4 INJECTION, SOLUTION INTRAMUSCULAR; INTRAVENOUS at 09:06

## 2022-04-25 RX ADMIN — LIDOCAINE HYDROCHLORIDE 50 MG: 20 INJECTION, SOLUTION INTRAVENOUS at 08:57

## 2022-04-25 RX ADMIN — PHENYLEPHRINE HYDROCHLORIDE 50 MCG: 10 INJECTION INTRAVENOUS at 09:20

## 2022-04-25 RX ADMIN — PHENYLEPHRINE HYDROCHLORIDE 50 MCG: 10 INJECTION INTRAVENOUS at 09:16

## 2022-04-25 RX ADMIN — MIDAZOLAM HYDROCHLORIDE 2 MG: 2 INJECTION, SOLUTION INTRAMUSCULAR; INTRAVENOUS at 08:47

## 2022-04-25 RX ADMIN — SUGAMMADEX 100 MG: 100 INJECTION, SOLUTION INTRAVENOUS at 09:45

## 2022-04-25 RX ADMIN — PHENYLEPHRINE HYDROCHLORIDE 100 MCG: 10 INJECTION INTRAVENOUS at 09:11

## 2022-04-25 ASSESSMENT — PAIN SCALES - GENERAL
PAINLEVEL_OUTOF10: 0
PAINLEVEL_OUTOF10: 7

## 2022-04-25 ASSESSMENT — PULMONARY FUNCTION TESTS
PIF_VALUE: 17
PIF_VALUE: 16
PIF_VALUE: 17
PIF_VALUE: 12
PIF_VALUE: 2
PIF_VALUE: 6
PIF_VALUE: 14
PIF_VALUE: 17
PIF_VALUE: 15
PIF_VALUE: 17
PIF_VALUE: 3
PIF_VALUE: 18
PIF_VALUE: 1
PIF_VALUE: 17
PIF_VALUE: 17
PIF_VALUE: 0
PIF_VALUE: 16
PIF_VALUE: 17
PIF_VALUE: 15
PIF_VALUE: 17
PIF_VALUE: 5
PIF_VALUE: 17
PIF_VALUE: 12
PIF_VALUE: 17
PIF_VALUE: 17
PIF_VALUE: 14
PIF_VALUE: 17
PIF_VALUE: 17
PIF_VALUE: 1
PIF_VALUE: 17
PIF_VALUE: 16
PIF_VALUE: 16
PIF_VALUE: 1
PIF_VALUE: 17
PIF_VALUE: 17
PIF_VALUE: 1
PIF_VALUE: 12
PIF_VALUE: 0
PIF_VALUE: 26
PIF_VALUE: 26
PIF_VALUE: 0
PIF_VALUE: 17
PIF_VALUE: 3
PIF_VALUE: 17
PIF_VALUE: 16
PIF_VALUE: 17
PIF_VALUE: 14
PIF_VALUE: 17

## 2022-04-25 ASSESSMENT — PAIN - FUNCTIONAL ASSESSMENT: PAIN_FUNCTIONAL_ASSESSMENT: 0-10

## 2022-04-25 NOTE — OP NOTE
Patient Name: Francois Jackson  YOB: 1995  Medical Record Number:  [id number]  Billing Number:  [id number]  Date of Procedure: 4/25/2022  Time: 4917      Pre-operative Diagnosis: 1. Chronic pansinusitis 2. Left suzi bullosa  Post-operative Diagnosis: Same  Proc: 1. Bilaertal nasal endoscopy with maxillary antrosotomy  (46836-01)   2. Bilateral nasal endoscopy with total ethmoidectomy  (38977-13)   3. Excision suzi bullosa left (43894)    Surgeon:  Sharon Meléndez,   OR Staff: Circulator: Jordy Corral RN  Scrub Person First: Aubrey Parnell RN  Scrub Person Second: Yasemin Riggs RN  Circulator Assist: Pamela Mcdermott RN  Anesthesia: General endotracheal  1. Afrin and 4% lidocaine topical in the nasal cavity and on pledgets in the middle meatus    2. 1% lidocaine with 1:100,000 epinephrine injected in the uncinate and middle turbinate bilateral    EBL: <50ml  Specimens: Sinus contents    Findings: Thick inspissated secretions and mucus within the ethmoid and maxillary cavities. Moderate amount of mucosal inflammation. Polypoid inflammation of the ethmoid cells. Complications: none  Brief History: This is a 81-year-old female with a history of chronic sinusitis symptoms. Recent CT revealed chronic mucosal thickening of the ethmoid and maxillary sinuses. Given that she has been on maximum medical therapy with persistent symptoms I recommended endoscopic sinus surgery and excision of a left suzi bullosa. Risks and benefits discussed. Consent obtained. Description of procedure:    After consent was confirmed the patient came into the operating room and was placed in supine position. General IV anesthesia was obtained and the patient intubated by Anesthesiology without difficulty. The table was turned and 1% lidocaine with 1:100,000 epinephrine was injected into the uncinate, middle turbinate. Surgery began on the left nasal cavity.  Using a zero degree endoscope and and a [place] elevator the middle turbinate was medialized in its inferior third. The uncinate was in fractured with a frontal sinus seeker. A Julio's window was made with a back biting forceps. The uncinate was removed with a 4mm, 0 degree straight shot micro debrider in its entirety from the inferior turbinate to the skull base. The natural ostium of the maxillary sinus was identified and back-fractured through the fontanelle with a frontal sinus seeker. A large maxillary antrostomy was then performed with the 0 degree micro debrider. A 0 degree micro-debrider was used to remove the ethmoid bulla lateral to the lamina papyracea , superior to the skull base and posterior to the basal lamella. A large window was made in the lamella preserving an inferior 1 cm strut. The posterior ethmoids were then removed with the micro debrider lateral to the lamina, posterior to the anterior wall of the sphenoid sinus and superior to the skull base. 1:1000 topical epinephrine pledgets were occasionally placed for hemostasis. Lolly Matthew bullosa was removed on the lateral aspect of the middle turbinate using the 0 degree StraightShot microdebrider. Surgery continued on the right nasal cavity. Using a zero degree endoscope and and a caudal elevator the middle turbinate was medialized in its inferior third. The uncinate was in fractured with a frontal sinus seeker. A Julio's window was made with a back biting forceps. The uncinate was removed with a 4mm, 0 degree straight shot micro debrider in its entirety from the inferior turbinate to the skull base. The natural ostium of the maxillary sinus was identified and back-fractured through the fontanelle with a frontal sinus seeker. A large maxillary antrostomy was then performed with the 0 degree micro debrider. A 0 degree micro-debrider was used to remove the ethmoid bulla lateral to the lamina papyracea , superior to the skull base and posterior to the basal lamella.  A large window was made in the lamella preserving an inferior 1 cm strut. The posterior ethmoids were then removed with the micro debrider lateral to the lamina, posterior to the anterior wall of the sphenoid sinus and superior to the skull base. 1:1000 topical epinephrine pledgets were occasionally placed for hemostasis. Pledgets were removed and the wounds were thoroughly irrigated with sterile saline. Posisep sinus splints were placed between the middle turbinates and lateral sinus wall on the bilateral. The splints were saturated with sterile saline solution. There was no further significant bleeding. The patient was then awoken from general anesthesia, extubated and sent to the PACU in stable condition.      Electronically signed by Chetan Proctor DO on 4/25/22 at 9:57 AM EDT

## 2022-04-25 NOTE — PROGRESS NOTES
PACU Transfer to [facility]      Pt meets criteria to transfer to next phase of care per Calos Tamika and OBDULIA standards    No results for input(s): POCGLU in the last 72 hours. Vitals:    04/25/22 1115   BP: 130/81   Pulse: 87   Resp: 20   Temp: 97.5 °F (36.4 °C)   SpO2: 97%      BP within 20% of pt's admitting BP as per Bernie Score      Intake/Output Summary (Last 24 hours) at 4/25/2022 1139  Last data filed at 4/25/2022 0954  Gross per 24 hour   Intake 1000 ml   Output 15 ml   Net 985 ml             Patient transferred to care of [facility] RN.   Family updated and directed to [facility]    4/25/2022 11:39 AM

## 2022-04-25 NOTE — PROGRESS NOTES
Ambulatory Surgery/Procedure Discharge Note  Pt alert and stable  Mustache drsg changed -clean dry and intact  Verbal and written discharge instructions given to pt and boyfriend    Rxs sent to her pharmacy  Up to bathroom , voided qs amt in toilet  Supplies given for mustache drsg change  Minimal pain in teeth,tolerable for discharge  No nausea  dcd per wheelchair to car with boyfriend present      Vitals:    04/25/22 1115   BP: 130/81   Pulse: 87   Resp: 20   Temp: 97.5 °F (36.4 °C)   SpO2: 97%       In: 1000 [I.V.:1000]  Out: 15     Restroom use offered before discharge. Yes    Pain assessment:  level of pain (1-10, 10 severe),   Pain level 4        Patient discharged to home/self care.  Patient discharged via wheel chair by transporter to waiting family/S.O.       4/25/2022 12:13 PM

## 2022-04-25 NOTE — BRIEF OP NOTE
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]    Post-Op Diagnosis: Same       Procedure(s):  EXCISION ALBINO BULLOSA LEFT/ BILATERAL MAXILLARY ANTROSTOMY AND BILATERAL TOTAL ETHMOIDECTOMY    Surgeon(s):  Ofe Marin DO    Assistant:  * No surgical staff found *    Anesthesia: General    Estimated Blood Loss (mL): less than 50     Complications: None    Specimens:   ID Type Source Tests Collected by Time Destination   A : A) SINUS CONTENTS Tissue Tissue SURGICAL PATHOLOGY Ofe Marin DO 4/25/2022 0903        Implants:  * No implants in log *      Drains: * No LDAs found *    Findings: see op note    Electronically signed by Ofe Marin DO on 4/25/2022 at 9:56 AM

## 2022-04-25 NOTE — PROGRESS NOTES
S/p EXCISION ALBINO BULLOSA LEFT/ BILATERAL MAXILLARY ANTROSTOMY AND BILATERAL TOTAL ETHMOIDECTOMY. Admitted to PACU bed 5 from OR. Arrived at 1000 . Attached to PACU monitoring system. Alarms and parameters set. Report received from anesthesia personnel. No complication reported intraoperatively. Pt on nasal cannula with oxygen at 3 liters. Athrombic wraps in place. VSS; will continue to monitor.

## 2022-04-25 NOTE — ANESTHESIA PRE PROCEDURE
Department of Anesthesiology  Preprocedure Note       Name:  Kem Tatum   Age:  23 y.o.  :  1995                                          MRN:  [id number]         Date:  2022      Surgeon: Minal Garg):  Keila Burgos DO    Procedure: Procedure(s):  EXCISION ALBINO BULLOSA LEFT/ BILATERAL MAXILLARY ANTROSTOMY AND BILATERAL TOTAL ETHMOIDECTOMY    Medications prior to admission:   Prior to Admission medications    Medication Sig Start Date End Date Taking? Authorizing Provider   Vibegron (GEMTESA) 75 MG TABS 75 mg 3/22/22   Historical Provider, MD   Meth-Hyo-M Bl-Na Phos-Ph Sal (URO-MP) 118 MG CAPS TAKE 118 MG BY MOUTH 4 (FOUR) TIMES DAILY.  22   Historical Provider, MD   nitrofurantoin, macrocrystal-monohydrate, (MACROBID) 100 MG capsule Take 100 mg by mouth as needed 21   Historical Provider, MD   D-Mannose 500 MG CAPS Take by mouth    Historical Provider, MD   fluticasone (FLONASE) 50 MCG/ACT nasal spray INSTILL 2 SPRAYS BY NASAL ROUTE EVERY DAY 21   Angelica Carney MD   pantoprazole (PROTONIX) 40 MG tablet TAKE 1 TABLET BY MOUTH EVERY MORNING 9/3/21   Historical Provider, MD   vitamin B-12 (CYANOCOBALAMIN) 500 MCG tablet Take 1 tablet by mouth daily 21  Angelica Carney MD   NEXPLANON 68 MG implant  19   Historical Provider, MD       Current medications:    Current Facility-Administered Medications   Medication Dose Route Frequency Provider Last Rate Last Admin    lactated ringers infusion   IntraVENous Continuous Peggy Schmidt  mL/hr at 22 0753 New Bag at 22 0753    sodium chloride flush 0.9 % injection 5-40 mL  5-40 mL IntraVENous 2 times per day Peggy Schmidt MD        sodium chloride flush 0.9 % injection 5-40 mL  5-40 mL IntraVENous PRN Peggy Schmidt MD        0.9 % sodium chloride infusion   IntraVENous PRN Peggy Schmidt MD        sodium chloride flush 0.9 % injection 5-40 mL  5-40 mL IntraVENous 2 times per day Brenda Betancourt MD        sodium chloride flush 0.9 % injection 5-40 mL  5-40 mL IntraVENous PRN Brenda Betancourt MD        0.9 % sodium chloride infusion  25 mL IntraVENous PRN Brenda Betancourt MD        HYDROmorphone (DILAUDID) injection 0.5 mg  0.5 mg IntraVENous Q10 Min PRN Brenda Betancourt MD        HYDROmorphone (DILAUDID) injection 0.5 mg  0.5 mg IntraVENous Q5 Min PRN Brenda Betancourt MD        oxyCODONE (ROXICODONE) immediate release tablet 5 mg  5 mg Oral PRN Brenda Betancourt MD        Or    oxyCODONE (ROXICODONE) immediate release tablet 10 mg  10 mg Oral PRN Brenda Betancourt MD        diphenhydrAMINE (BENADRYL) injection 12.5 mg  12.5 mg IntraVENous Once PRN Brenda Betancourt MD        metoclopramide [facility]) injection 10 mg  10 mg IntraVENous Once PRN Brenda Betancourt MD        labetalol (NORMODYNE;TRANDATE) injection 10 mg  10 mg IntraVENous Q15 Min PRN Brenda Betancourt MD        Or    hydrALAZINE (APRESOLINE) injection 10 mg  10 mg IntraVENous Q15 Min PRN Brenda Betancourt MD        meperidine (DEMEROL) injection 12.5 mg  12.5 mg IntraVENous Q5 Min PRN Brenda Betancourt MD           Allergies:     Allergies   Allergen Reactions    Augmentin [Amoxicillin-Pot Clavulanate] Nausea And Vomiting       Problem List:    Patient Active Problem List   Diagnosis Code    Anxiety F41.9    Heart palpitations R00.2    HPV (human papilloma virus) anogenital infection A63.0    Tachycardia R00.0    Non-allergic rhinitis J31.0    B12 deficiency E53.8    Crohn's disease of small intestine ([facility] [address] K50.00    Chronic sinusitis J32.9       Past Medical History:        Diagnosis Date    Abdominal bloating     h pylori pos    Anxiety 03/01/2018    B12 deficiency     Chronic sinusitis     COVID-19 12/17/2020    Crohn's disease of small intestine ([facility] [address] 05/2021    GERD (gastroesophageal reflux disease)     Non-allergic rhinitis     saw allergist dr Marj Wong, negative for trees, grasses, molds    Overactive bladder     Tachycardia 01/28/208    ectopic atrial rhythm, inappropriate sinus tachycardia    Urge incontinence     interstial cystitis - dr Omkar pineda- urogyn-pelvic floor rehab       Past Surgical History:        Procedure Laterality Date    COLONOSCOPY  04/29/2021    terminal ileum ulcers, path pdg-possible crohn's    COLPOSCOPY      for ascus - [address] [place] [address]    CYSTOSCOPY      UPPER GASTROINTESTINAL ENDOSCOPY  04/29/2021    mild erythema-gastric antrum, grade a reflux esophagitis    WISDOM TOOTH EXTRACTION         Social History:    Social History     Tobacco Use    Smoking status: Never Smoker    Smokeless tobacco: Never Used   Substance Use Topics    Alcohol use: Yes     Comment: 4-5 times per month                                Counseling given: Not Answered      Vital Signs (Current):   Vitals:    04/25/22 0734   BP: 138/89   Pulse: 100   Resp: 16   Temp: 98.9 °F (37.2 °C)   TempSrc: Temporal   SpO2: 100%   Weight: 102 lb 12.8 oz (46.6 kg)   Height: 5' (1.524 m)                                              BP Readings from Last 3 Encounters:   04/25/22 138/89   04/01/22 112/71   02/01/22 124/78       NPO Status:                                                   Date of last liquid consumption: 04/24/22                        Date of last solid food consumption: 04/24/22    BMI:   Wt Readings from Last 3 Encounters:   04/25/22 102 lb 12.8 oz (46.6 kg)   04/01/22 103 lb 3.2 oz (46.8 kg)   02/01/22 98 lb (44.5 kg)     Body mass index is 20.08 kg/m².     CBC:   Lab Results   Component Value Date    WBC 6.9 04/01/2022    RBC 4.56 04/01/2022    HGB 13.5 04/01/2022    HCT 40.5 04/01/2022    MCV 88.8 04/01/2022    RDW 13.6 04/01/2022     04/01/2022       CMP:   Lab Results   Component Value Date     04/01/2022    K 4.8 04/01/2022     04/01/2022    CO2 23 04/01/2022    BUN 10 04/01/2022    CREATININE 0.6 04/01/2022    GFRAA >60 04/01/2022    AGRATIO 1.6 04/01/2022 LABGLOM >60 04/01/2022    GLUCOSE 86 04/01/2022    PROT 7.4 04/01/2022    CALCIUM 9.5 04/01/2022    BILITOT 0.6 04/01/2022    ALKPHOS 101 04/01/2022    AST 16 04/01/2022    ALT 13 04/01/2022       POC Tests: No results for input(s): POCGLU, POCNA, POCK, POCCL, POCBUN, POCHEMO, POCHCT in the last 72 hours. Coags:   Lab Results   Component Value Date    PROTIME 12.6 04/07/2022    INR 1.11 04/07/2022    APTT 36.2 04/07/2022       HCG (If Applicable):   Lab Results   Component Value Date    PREGTESTUR Negative 04/25/2022        ABGs: No results found for: PHART, PO2ART, HCH5ZMR, LPG3AZE, BEART, F1GSCABY     Type & Screen (If Applicable):  No results found for: LABABO, LABRH    Drug/Infectious Status (If Applicable):  No results found for: HIV, HEPCAB    COVID-19 Screening (If Applicable):   Lab Results   Component Value Date    COVID19 Detected 12/16/2020           Anesthesia Evaluation  Patient summary reviewed and Nursing notes reviewed no history of anesthetic complications:   Airway: Mallampati: II  TM distance: >3 FB   Neck ROM: full  Mouth opening: > = 3 FB Dental:          Pulmonary:Negative Pulmonary ROS                              Cardiovascular:Negative CV ROS    (+) dysrhythmias: SVT,                   Neuro/Psych:   Negative Neuro/Psych ROS              GI/Hepatic/Renal:   (+) GERD:,           Endo/Other: Negative Endo/Other ROS                    Abdominal:             Vascular: negative vascular ROS. Other Findings:             Anesthesia Plan      general     ASA 3    (55-year-old female presents for EXCISION ALBINO BULLOSA LEFT/ BILATERAL MAXILLARY ANTROSTOMY AND BILATERAL TOTAL ETHMOIDECTOMY . Plan general anesthesia with ASA standard monitors. Questions answered. Patient agreeable with anesthetic plan.  )  Induction: intravenous. Anesthetic plan and risks discussed with patient. Plan discussed with CRNA.     Attending anesthesiologist reviewed and agrees with Preprocedure Americo Landau, MD   4/25/2022

## 2022-04-25 NOTE — H&P
[facility]    [id number]    [facility] Same Day Surgery Update H & P  Department of General Surgery   Surgical Service   Pre-operative History and Physical  Last H & P within the last 30 days. 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 female with c/o chronic sinusitis, dizziness and headache. Their symptoms have been unrelieved with conservative therapy and they presents today for the above procedure. Illness Screening: Patient denies fever, chills, worsening cough, or close contact with sick individuals. Past Medical History:        Diagnosis Date    Abdominal bloating     h pylori pos    Anxiety 03/01/2018    B12 deficiency     Chronic sinusitis     COVID-19 12/17/2020    Crohn's disease of small intestine ([address] 05/2021    GERD (gastroesophageal reflux disease)     Non-allergic rhinitis     saw allergist dr Jenifer Simpson, negative for trees, grasses, molds    Overactive bladder     Tachycardia 01/28/208    ectopic atrial rhythm, inappropriate sinus tachycardia    Urge incontinence     interstial cystitis - dr Octavio pineda- urogyn-pelvic floor rehab     Past Surgical History:        Procedure Laterality Date    COLONOSCOPY  04/29/2021    terminal ileum ulcers, path pdg-possible crohn's    COLPOSCOPY      for ascus - [address] [place] [address]    CYSTOSCOPY      UPPER GASTROINTESTINAL ENDOSCOPY  04/29/2021    mild erythema-gastric antrum, grade a reflux esophagitis    WISDOM TOOTH EXTRACTION           Medications Prior to Admission:      Prior to Admission medications    Medication Sig Start Date End Date Taking?  Authorizing Provider   Vibegron (GEMTESA) 75 MG TABS 75 mg 3/22/22   Historical Provider, MD   Meth-Hyo-M Bl-Na Phos-Ph Sal (URO-MP) 118 MG CAPS TAKE 118 MG BY MOUTH 4 (FOUR) TIMES DAILY. 2/24/22   Historical Provider, MD   nitrofurantoin, macrocrystal-monohydrate, (MACROBID) 100 MG capsule Take 100 mg by mouth as needed 12/27/21   Historical Provider, MD   D-Mannose 500 MG CAPS Take by mouth    Historical Provider, MD   fluticasone (FLONASE) 50 MCG/ACT nasal spray INSTILL 2 SPRAYS BY NASAL ROUTE EVERY DAY 12/6/21   Deloria Severance, MD   pantoprazole (PROTONIX) 40 MG tablet TAKE 1 TABLET BY MOUTH EVERY MORNING 9/3/21   Historical Provider, MD   vitamin B-12 (CYANOCOBALAMIN) 500 MCG tablet Take 1 tablet by mouth daily 4/16/21 4/16/22  Deloria Severance, MD   NEXPLANON 68 MG implant  9/16/19   Historical Provider, MD         Allergies:  Augmentin [amoxicillin-pot clavulanate]    PHYSICAL EXAM:      /89   Pulse 100   Temp 98.9 °F (37.2 °C) (Temporal)   Resp 16   Ht 5' (1.524 m)   Wt 102 lb 12.8 oz (46.6 kg)   LMP  (LMP Unknown)   SpO2 100%   BMI 20.08 kg/m²      Airway:  Airway patent with no audible stridor    Heart:  Regular rate and rhythm, No murmur noted    Lungs:  No increased work of breathing, good air exchange, clear to auscultation bilaterally, no crackles or wheezing    Abdomen:  Soft, non-distended, non-tender, no rebound tenderness or guarding, and no masses palpated    ASSESSMENT AND PLAN     Patient is a 23 y.o. female with above specified procedure planned. 1.  The patients history and physical was obtained and signed off by the pre-admission testing department. Patient seen and focused exam done today- no new changes since last physical exam on 4/1/22    2. Access to ancillary services are available per request of the provider.     Alireza Orr, APRN - CNP     4/25/2022

## 2022-04-25 NOTE — ANESTHESIA POSTPROCEDURE EVALUATION
Department of Anesthesiology  Postprocedure Note    Patient: cT Garcia  MRN: [id number]  YOB: 1995  Date of evaluation: 4/25/2022  Time:  10:53 AM     Procedure Summary     Date: 04/25/22 Room / Location: [facility] 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        Anesthesia Post Evaluation    Patient location during evaluation: PACU  Patient participation: complete - patient participated  Level of consciousness: awake and alert  Pain score: 0  Airway patency: patent  Nausea & Vomiting: no nausea and no vomiting  Complications: no  Cardiovascular status: hemodynamically stable  Respiratory status: acceptable  Hydration status: euvolemic

## 2022-05-02 ENCOUNTER — TELEPHONE (OUTPATIENT)
Dept: ENT CLINIC | Age: 27
End: 2022-05-02

## 2022-05-02 NOTE — TELEPHONE ENCOUNTER
Pt called  To say she needed's her RTW  Papers  She will fax them but needs the paper filled out  ASAP she will also mychart Dr Claudia Juarez to help this process

## 2022-05-03 ENCOUNTER — OFFICE VISIT (OUTPATIENT)
Dept: ENT CLINIC | Age: 27
End: 2022-05-03
Payer: COMMERCIAL

## 2022-05-03 VITALS
WEIGHT: 103.6 LBS | HEIGHT: 60 IN | HEART RATE: 93 BPM | SYSTOLIC BLOOD PRESSURE: 103 MMHG | TEMPERATURE: 98.3 F | DIASTOLIC BLOOD PRESSURE: 63 MMHG | BODY MASS INDEX: 20.34 KG/M2

## 2022-05-03 DIAGNOSIS — G43.809 VESTIBULAR MIGRAINE: ICD-10-CM

## 2022-05-03 DIAGNOSIS — R42 DIZZINESS: ICD-10-CM

## 2022-05-03 DIAGNOSIS — R51.9 SINUS HEADACHE: ICD-10-CM

## 2022-05-03 DIAGNOSIS — J34.89 CONCHA BULLOSA: ICD-10-CM

## 2022-05-03 DIAGNOSIS — J32.4 CHRONIC PANSINUSITIS: Primary | ICD-10-CM

## 2022-05-03 PROCEDURE — 99212 OFFICE O/P EST SF 10 MIN: CPT | Performed by: OTOLARYNGOLOGY

## 2022-05-03 PROCEDURE — 31237 NSL/SINS NDSC SURG BX POLYPC: CPT | Performed by: OTOLARYNGOLOGY

## 2022-05-03 NOTE — PROGRESS NOTES
[facility]  [address], [address] [place] [address]  P: [phone]  F: [phone]       Patient     [facility]ana [facility]  1995    ChiefComplaint     Chief Complaint   Patient presents with    Post-Op Check     Since her last visit has not had any pain, no facial pressure and feels like breathing through her nose is easier       History of Present Illness     Chela Astudillo is a pleasant 23 y.o. female here for 1 week postop visit for endoscopic sinus surgery. Overall doing well. Using saline as instructed. Having some mild to moderate discomfort.     Past Medical History     Past Medical History:   Diagnosis Date    Abdominal bloating     h pylori pos    Anxiety 03/01/2018    B12 deficiency     Chronic sinusitis     path report - dense \"eosinophilic infiltrate\"    COVID-19 12/17/2020    Crohn's disease of small intestine ([facility] [address] 05/2021    GERD (gastroesophageal reflux disease)     Non-allergic rhinitis     saw allergist dr Pavel Mason, negative for trees, grasses, molds    Overactive bladder     Tachycardia 01/28/208    ectopic atrial rhythm, inappropriate sinus tachycardia    Urge incontinence     interstial cystitis - dr Jam pineda- urogyn-pelvic floor rehab       Past Surgical History     Past Surgical History:   Procedure Laterality Date    COLONOSCOPY  04/29/2021    terminal ileum ulcers, path pdg-possible crohn's    COLPOSCOPY      for ascus - [address] [place] [address]    CYSTOSCOPY      SEPTOPLASTY Bilateral 4/25/2022    EXCISION ALBINO BULLOSA LEFT/ BILATERAL MAXILLARY ANTROSTOMY AND BILATERAL TOTAL ETHMOIDECTOMY performed by Corrine Dobbins DO at [facility]  04/29/2021    mild erythema-gastric antrum, grade a reflux esophagitis    WISDOM TOOTH EXTRACTION         Family History     Family History   Problem Relation Age of Onset    High Blood Pressure Father     Breast Cancer Maternal Aunt     Diabetes type 2  Maternal Grandmother Social History     Social History     Socioeconomic History    Marital status: Single     Spouse name: Not on file    Number of children: 0    Years of education: Not on file    Highest education level: Not on file   Occupational History    Occupation: reading [place]    Tobacco Use    Smoking status: Never Smoker    Smokeless tobacco: Never Used   Vaping Use    Vaping Use: Never used   Substance and Sexual Activity    Alcohol use: Yes     Comment: 4-5 times per month    Drug use: No    Sexual activity: Yes     Partners: Male     Comment: boyfriend   Other Topics Concern    Not on file   Social History Narrative    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     Social Determinants of Health     Financial Resource Strain: Low Risk     Difficulty of Paying Living Expenses: Not hard at all   Food Insecurity: No Food Insecurity    Worried About [address] in the Last Year: Never true    [address] in the Last Year: Never true   Transportation Needs:     Lack of Transportation (Medical): Not on file    Lack of Transportation (Non-Medical):  Not on file   Physical Activity:     Days of Exercise per Week: Not on file    Minutes of Exercise per Session: Not on file   Stress:     Feeling of Stress : Not on file   Social Connections:     Frequency of Communication with Friends and Family: Not on file    Frequency of Social Gatherings with Friends and Family: Not on file    Attends Temple Services: Not on file    Active Member of Clubs or Organizations: Not on file    Attends Club or Organization Meetings: Not on file    Marital Status: Not on file   Intimate Partner Violence:     Fear of Current or Ex-Partner: Not on file    Emotionally Abused: Not on file    Physically Abused: Not on file    Sexually Abused: Not on file   Housing Stability:     Unable to Pay for Housing in the Last Year: Not on file    Number of [place] in the Last Year: Not on file    Unstable Housing in the Last Year: Not on file       Allergies     Allergies   Allergen Reactions    Augmentin [Amoxicillin-Pot Clavulanate] Nausea And Vomiting       Medications     Current Outpatient Medications   Medication Sig Dispense Refill    Vibegron (GEMTESA) 75 MG TABS 75 mg      Meth-Hyo-M Bl-Na Phos-Ph Sal (URO-MP) 118 MG CAPS TAKE 118 MG BY MOUTH 4 (FOUR) TIMES DAILY.  nitrofurantoin, macrocrystal-monohydrate, (MACROBID) 100 MG capsule Take 100 mg by mouth as needed      D-Mannose 500 MG CAPS Take by mouth      fluticasone (FLONASE) 50 MCG/ACT nasal spray INSTILL 2 SPRAYS BY NASAL ROUTE EVERY DAY 16 g 11    pantoprazole (PROTONIX) 40 MG tablet TAKE 1 TABLET BY MOUTH EVERY MORNING      NEXPLANON 68 MG implant       vitamin B-12 (CYANOCOBALAMIN) 500 MCG tablet Take 1 tablet by mouth daily 30 tablet 3     No current facility-administered medications for this visit. Review of Systems     Review of Systems      PhysicalExam     Vitals:    05/03/22 1412   BP: 103/63   Pulse: 93   Temp: 98.3 °F (36.8 °C)       Physical Exam      Procedure       Nasal Endoscopy with Debridement  Pre Op Dx: Nasal congestion, post operative sinus surgery  Post Op Dx: Same  Procedure: Nasal endoscopy with debridement   Anesthesia: 2% lidocaine with afrin tiopical  EBL: None  Specimens: None  Findings: Bilateral nasal cavity and sinus crusting and mucus. Procedure:    After the application of afrin and pontocaine the nasal sinus cavity was debrided utilizing a zero degree lauren endoscope with Kerrison rongeurs and rodas suctions on the left side. The sinus lining was healing well. No evidence of bleeding or rhinorrhea was noted. Tolerated well. Complications: None        Assessment and Plan     1. Chronic pansinusitis  Sinuses healing well. Debridement performed on left side. Right side with minimal mucus or crusting. Sinonasal mucosa well-healing. Continue saline for 2 more weeks.   Follow-up in 2 weeks. Return to normal activity in 1 week. 2. Dizziness      3. Vestibular migraine      4. Sinus headache      5. Aliza bullosa        Return in about 2 weeks (around 5/17/2022). Portions of this note were dictated using Dragon.  There may be linguistic errors secondary to the use of this program.

## 2022-05-20 ENCOUNTER — OFFICE VISIT (OUTPATIENT)
Dept: ENT CLINIC | Age: 27
End: 2022-05-20
Payer: COMMERCIAL

## 2022-05-20 VITALS — BODY MASS INDEX: 20.22 KG/M2 | HEIGHT: 60 IN | WEIGHT: 103 LBS

## 2022-05-20 DIAGNOSIS — J34.2 DNS (DEVIATED NASAL SEPTUM): ICD-10-CM

## 2022-05-20 DIAGNOSIS — G43.809 VESTIBULAR MIGRAINE: ICD-10-CM

## 2022-05-20 DIAGNOSIS — R42 DIZZINESS: ICD-10-CM

## 2022-05-20 DIAGNOSIS — J34.89 CONCHA BULLOSA: ICD-10-CM

## 2022-05-20 DIAGNOSIS — R51.9 SINUS HEADACHE: ICD-10-CM

## 2022-05-20 DIAGNOSIS — J32.4 CHRONIC PANSINUSITIS: Primary | ICD-10-CM

## 2022-05-20 PROCEDURE — 31231 NASAL ENDOSCOPY DX: CPT | Performed by: OTOLARYNGOLOGY

## 2022-05-20 PROCEDURE — 99213 OFFICE O/P EST LOW 20 MIN: CPT | Performed by: OTOLARYNGOLOGY

## 2022-05-20 RX ORDER — MOMETASONE FUROATE 50 UG/1
2 SPRAY, METERED NASAL DAILY
Qty: 1 EACH | Refills: 5 | Status: SHIPPED | OUTPATIENT
Start: 2022-05-20 | End: 2022-06-10 | Stop reason: SDUPTHER

## 2022-05-20 ASSESSMENT — ENCOUNTER SYMPTOMS
SHORTNESS OF BREATH: 0
STRIDOR: 0
NAUSEA: 0
VOICE CHANGE: 0
PHOTOPHOBIA: 0
CHOKING: 0
DIARRHEA: 0
FACIAL SWELLING: 0
SORE THROAT: 0
EYE ITCHING: 0
RHINORRHEA: 0
COUGH: 0
EYE PAIN: 0
SINUS PAIN: 0
SINUS PRESSURE: 0
EYE REDNESS: 0
COLOR CHANGE: 0
TROUBLE SWALLOWING: 0

## 2022-05-20 NOTE — PROGRESS NOTES
 High Blood Pressure Father     Breast Cancer Maternal Aunt     Diabetes type 2  Maternal Grandmother        Social History     Social History     Socioeconomic History    Marital status: Single     Spouse name: Not on file    Number of children: 0    Years of education: Not on file    Highest education level: Not on file   Occupational History    Occupation: reading [place]    Tobacco Use    Smoking status: Never Smoker    Smokeless tobacco: Never Used   Vaping Use    Vaping Use: Never used   Substance and Sexual Activity    Alcohol use: Yes     Comment: 4-5 times per month    Drug use: No    Sexual activity: Yes     Partners: Male     Comment: boyfriend   Other Topics Concern    Not on file   Social History Narrative    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     Social Determinants of Health     Financial Resource Strain: Low Risk     Difficulty of Paying Living Expenses: Not hard at all   Food Insecurity: No Food Insecurity    Worried About [address] in the Last Year: Never true    [address] [facility] [address] in the Last Year: Never true   Transportation Needs:     Lack of Transportation (Medical): Not on file    Lack of Transportation (Non-Medical):  Not on file   Physical Activity:     Days of Exercise per Week: Not on file    Minutes of Exercise per Session: Not on file   Stress:     Feeling of Stress : Not on file   Social Connections:     Frequency of Communication with Friends and Family: Not on file    Frequency of Social Gatherings with Friends and Family: Not on file    Attends Denominational Services: Not on file    Active Member of Clubs or Organizations: Not on file    Attends Club or Organization Meetings: Not on file    Marital Status: Not on file   Intimate Partner Violence:     Fear of Current or Ex-Partner: Not on file    Emotionally Abused: Not on file    Physically Abused: Not on file    Sexually Abused: Not on file   Housing Stability:     Unable to Pay for Housing in the Last Year: Not on file    Number of Places Lived in the Last Year: Not on file    Unstable Housing in the Last Year: Not on file       Allergies     Allergies   Allergen Reactions    Augmentin [Amoxicillin-Pot Clavulanate] Nausea And Vomiting       Medications     Current Outpatient Medications   Medication Sig Dispense Refill    mometasone (NASONEX) 50 MCG/ACT nasal spray 2 sprays by Nasal route daily 1 each 5    Vibegron (GEMTESA) 75 MG TABS 75 mg      Meth-Hyo-M Bl-Na Phos-Ph Sal (URO-MP) 118 MG CAPS TAKE 118 MG BY MOUTH 4 (FOUR) TIMES DAILY.  nitrofurantoin, macrocrystal-monohydrate, (MACROBID) 100 MG capsule Take 100 mg by mouth as needed      D-Mannose 500 MG CAPS Take by mouth      pantoprazole (PROTONIX) 40 MG tablet TAKE 1 TABLET BY MOUTH EVERY MORNING      NEXPLANON 68 MG implant       vitamin B-12 (CYANOCOBALAMIN) 500 MCG tablet Take 1 tablet by mouth daily 30 tablet 3     No current facility-administered medications for this visit. Review of Systems     Review of Systems   Constitutional: Negative for chills, fatigue and fever. HENT: Negative for congestion, ear discharge, ear pain, facial swelling, hearing loss, nosebleeds, postnasal drip, rhinorrhea, sinus pressure, sinus pain, sneezing, sore throat, tinnitus, trouble swallowing and voice change. Eyes: Negative for photophobia, pain, redness, itching and visual disturbance. Respiratory: Negative for cough, choking, shortness of breath and stridor. Gastrointestinal: Negative for diarrhea and nausea. Musculoskeletal: Negative for neck pain and neck stiffness. Skin: Negative for color change and rash. Neurological: Negative for dizziness, facial asymmetry and light-headedness. Hematological: Negative for adenopathy. Psychiatric/Behavioral: Negative for agitation and confusion. PhysicalExam     There were no vitals filed for this visit.     Physical Exam  Constitutional:       Appearance: She is well-developed. HENT:      Head: Normocephalic and atraumatic. Jaw: No trismus. Right Ear: Tympanic membrane, ear canal and external ear normal. No drainage. No middle ear effusion. Tympanic membrane is not perforated. Left Ear: Tympanic membrane, ear canal and external ear normal. No drainage. No middle ear effusion. Tympanic membrane is not perforated. Nose: No septal deviation, mucosal edema or rhinorrhea. Mouth/Throat:      Dentition: Normal dentition. Pharynx: Uvula midline. No oropharyngeal exudate. Eyes:      General: No scleral icterus. Right eye: No discharge. Left eye: No discharge. Pupils: Pupils are equal, round, and reactive to light. Neck:      Thyroid: No thyromegaly. Trachea: Phonation normal. No tracheal deviation. Pulmonary:      Effort: Pulmonary effort is normal. No respiratory distress. Breath sounds: No stridor. Musculoskeletal:      Cervical back: Neck supple. Lymphadenopathy:      Cervical: No cervical adenopathy. Skin:     General: Skin is warm and dry. Neurological:      Mental Status: She is alert and oriented to person, place, and time. Cranial Nerves: No cranial nerve deficit. Psychiatric:         Behavior: Behavior normal.           Procedure     Rigid Nasal Endoscopy    Preop: Chronic sinusitis  Anes: topical lidocaine with afrin  Consent: verbal  Description:  After obtaining verbal consent from the patient 4% lidocaine with afrin was sprayed into the nasal cavities. After allowing a time for anesthesia, a nasal endoscope was placed into the naris. The septum, inferior, and middle turbinates were examined. The middle meatus, and sphenoethmoid recess was examined bilaterally. Ethmoid cavities healing well bilaterally. Moderate amount of edema of the left ethmoid cavity. No mucopurulent drainage. Tolerated well without complication. Assessment and Plan     1.  Chronic pansinusitis  Patient overall doing well 3 weeks out from endoscopic sinus surgery. She does have some mild to moderate inflammation. I recommend starting a nasal steroid spray. She typically would get lightheaded with fluticasone. We will do a trial of Nasonex. Follow-up in 2 months. - mometasone (NASONEX) 50 MCG/ACT nasal spray; 2 sprays by Nasal route daily  Dispense: 1 each; Refill: 5    2. Dizziness      3. Vestibular migraine  Patient continues to experience some headaches. 4. Sinus headache      5. DNS (deviated nasal septum)      6. Aliza bullosa        Return in about 2 months (around 7/20/2022). Portions of this note were dictated using Dragon.  There may be linguistic errors secondary to the use of this program.

## 2022-05-23 DIAGNOSIS — J32.4 CHRONIC PANSINUSITIS: Primary | ICD-10-CM

## 2022-05-23 RX ORDER — FLUTICASONE PROPIONATE 50 MCG
2 SPRAY, SUSPENSION (ML) NASAL DAILY
Qty: 16 G | Refills: 5 | Status: SHIPPED | OUTPATIENT
Start: 2022-05-23 | End: 2022-07-19

## 2022-06-10 DIAGNOSIS — J32.4 CHRONIC PANSINUSITIS: ICD-10-CM

## 2022-06-10 RX ORDER — MOMETASONE FUROATE 50 UG/1
2 SPRAY, METERED NASAL DAILY
Qty: 3 EACH | Refills: 1 | Status: SHIPPED | OUTPATIENT
Start: 2022-06-10 | End: 2022-07-19

## 2022-07-05 ENCOUNTER — TELEPHONE (OUTPATIENT)
Dept: INTERNAL MEDICINE CLINIC | Age: 27
End: 2022-07-05

## 2022-07-05 RX ORDER — SERTRALINE HYDROCHLORIDE 25 MG/1
25 TABLET, FILM COATED ORAL DAILY
Qty: 30 TABLET | Refills: 1 | Status: SHIPPED | OUTPATIENT
Start: 2022-07-05 | End: 2022-08-29

## 2022-07-05 NOTE — TELEPHONE ENCOUNTER
Patient is calling regarding sertraline (ZOLOFT) 25 MG tablet. Our records indicate this has been discontinued. She still has the prescription she picked up in September? It was originally prescribed for anxiety/depression and patient just never took it because at the time she didn't feel she needed it but and now believes she does need it. She is wondering if it would be ok to start taking the medication she has since it is nearly a year old. And what should she look for in the way of side effects and are there any tips to avoid side effects? Please contact patient to advise.

## 2022-07-05 NOTE — TELEPHONE ENCOUNTER
Message left for pt to call and make appt for yearly exam. Ginger Brito RN      Patient is requesting another doctor please advise regarding her prescription request today since Dr. Bibi White is out of the office. Please contact patient @ phone # provided.

## 2022-07-06 NOTE — TELEPHONE ENCOUNTER
Patient is requesting Dr. Sadia Marina advice regarding the following:    Patient states Zoloft is such a tiny pill she doesn't think she can cut it in half. She is requesting Dr. Sadia Marina opinion. Please contact her @ phone # provided.

## 2022-07-13 LAB — PAP SMEAR, EXTERNAL: NEGATIVE

## 2022-07-18 ENCOUNTER — TELEPHONE (OUTPATIENT)
Dept: INTERNAL MEDICINE CLINIC | Age: 27
End: 2022-07-18

## 2022-07-18 NOTE — TELEPHONE ENCOUNTER
Patient is requesting to know that since she's now taking Zoloft 25 mg, is it OK to take the   Meth-Hyo-M Bl-Na Phos-Ph Sal (URO-MP) 118 MG CAPS TAKE 118 MG BY MOUTH 4 (FOUR) TIMES DAILY. Hilda Montilla She states Dr. Field Every said it was OK to take the URO-MP while she was on the lower dose of Zoloft 12.5 mg, but isn't sure if she can take the 25 mg with that medication. Please contact patient @ phone # provided.

## 2022-07-19 ENCOUNTER — OFFICE VISIT (OUTPATIENT)
Dept: ENT CLINIC | Age: 27
End: 2022-07-19
Payer: COMMERCIAL

## 2022-07-19 VITALS
SYSTOLIC BLOOD PRESSURE: 116 MMHG | HEART RATE: 101 BPM | BODY MASS INDEX: 20.22 KG/M2 | HEIGHT: 60 IN | DIASTOLIC BLOOD PRESSURE: 76 MMHG | WEIGHT: 103 LBS

## 2022-07-19 DIAGNOSIS — J34.3 NASAL TURBINATE HYPERTROPHY: ICD-10-CM

## 2022-07-19 DIAGNOSIS — R42 DIZZINESS: ICD-10-CM

## 2022-07-19 DIAGNOSIS — J32.4 CHRONIC PANSINUSITIS: Primary | ICD-10-CM

## 2022-07-19 DIAGNOSIS — G43.809 VESTIBULAR MIGRAINE: ICD-10-CM

## 2022-07-19 DIAGNOSIS — J34.2 DNS (DEVIATED NASAL SEPTUM): ICD-10-CM

## 2022-07-19 PROCEDURE — 31231 NASAL ENDOSCOPY DX: CPT | Performed by: OTOLARYNGOLOGY

## 2022-07-19 PROCEDURE — 99213 OFFICE O/P EST LOW 20 MIN: CPT | Performed by: OTOLARYNGOLOGY

## 2022-07-19 ASSESSMENT — ENCOUNTER SYMPTOMS
SINUS PRESSURE: 0
EYE ITCHING: 0
COUGH: 0
SORE THROAT: 0
PHOTOPHOBIA: 0
DIARRHEA: 0
RHINORRHEA: 0
VOICE CHANGE: 0
SINUS PAIN: 0
NAUSEA: 0
SHORTNESS OF BREATH: 0
FACIAL SWELLING: 0
CHOKING: 0
EYE REDNESS: 0
COLOR CHANGE: 0
EYE PAIN: 0
TROUBLE SWALLOWING: 0
STRIDOR: 0

## 2022-07-19 NOTE — PROGRESS NOTES
[facility]  [address] [address] [facility], [address] [place] [address], [address] [place] [address] Larrye  P: [phone]  F: [phone]       Patient     Robert Tomlin  1995    ChiefComplaint     Chief Complaint   Patient presents with    Follow-up     Patient is here today for her 2 month follow up, she is doing well with a little stuffiness and she has an oder when she tilts her head up or down       History of Present Illness     Robert Tomlin is a pleasant 33 y.o. female here for 3-month postop visit for endoscopic sinus surgery for chronic sinusitis. Has a little bit of stuffiness occasionally, mostly in the left nostril. Also has noticed an occasional odor in her nose. Continues to use flonase. Nasal obstruction has improved since surgery. Headaches and dizziness have improved as well.     Past Medical History     Past Medical History:   Diagnosis Date    Abdominal bloating     h pylori pos    Anxiety 03/01/2018    B12 deficiency     Chronic sinusitis     path report - dense \"eosinophilic infiltrate\"    COAOV-86 12/17/2020    Crohn's disease of small intestine ([address] 05/2021    GERD (gastroesophageal reflux disease)     Non-allergic rhinitis     saw allergist dr Ava Kebede, negative for trees, grasses, molds    Overactive bladder     Tachycardia 01/28/208    ectopic atrial rhythm, inappropriate sinus tachycardia    Urge incontinence     interstial cystitis - dr Rona pineda- urogyn-pelvic floor rehab       Past Surgical History     Past Surgical History:   Procedure Laterality Date    COLONOSCOPY  04/29/2021    terminal ileum ulcers, path pdg-possible crohn's    COLPOSCOPY      for ascus - [address] [place] [address]    CYSTOSCOPY      SEPTOPLASTY Bilateral 4/25/2022    EXCISION ALBINO BULLOSA LEFT/ BILATERAL MAXILLARY ANTROSTOMY AND BILATERAL TOTAL ETHMOIDECTOMY performed by Indira Barksdale DO at [address]  04/29/2021    mild erythema-gastric antrum, grade a reflux esophagitis    WISDOM TOOTH EXTRACTION         Family History     Family History   Problem Relation Age of Onset    High Blood Pressure Father     Breast Cancer Maternal Aunt     Diabetes type 2  Maternal Grandmother        Social History     Social History     Socioeconomic History    Marital status: Single     Spouse name: Not on file    Number of children: 0    Years of education: Not on file    Highest education level: Not on file   Occupational History    Occupation: reading [place]    Tobacco Use    Smoking status: Never    Smokeless tobacco: Never   Vaping Use    Vaping Use: Never used   Substance and Sexual Activity    Alcohol use: Yes     Comment: 4-5 times per month    Drug use: No    Sexual activity: Yes     Partners: Male     Comment: boyfriend   Other Topics Concern    Not on file   Social History Narrative    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     Social Determinants of Health     Financial Resource Strain: Low Risk     Difficulty of Paying Living Expenses: Not hard at all   Food Insecurity: No Food Insecurity    Worried About Running Out of Food in the Last Year: Never true    Ran Out of Food in the Last Year: Never true   Transportation Needs: Not on file   Physical Activity: Not on file   Stress: Not on file   Social Connections: Not on file   Intimate Partner Violence: Not on file   Housing Stability: Not on file       Allergies     Allergies   Allergen Reactions    Augmentin [Amoxicillin-Pot Clavulanate] Nausea And Vomiting       Medications     Current Outpatient Medications   Medication Sig Dispense Refill    sertraline (ZOLOFT) 25 MG tablet Take 1 tablet by mouth daily 30 tablet 1    Vibegron (GEMTESA) 75 MG TABS 75 mg      nitrofurantoin, macrocrystal-monohydrate, (MACROBID) 100 MG capsule Take 100 mg by mouth as needed      D-Mannose 500 MG CAPS Take by mouth      pantoprazole (PROTONIX) 40 MG tablet TAKE 1 TABLET BY MOUTH EVERY MORNING      NEXPLANON 68 MG implant       vitamin B-12 (CYANOCOBALAMIN) 500 Thyroid: No thyromegaly. Trachea: Phonation normal. No tracheal deviation. Pulmonary:      Effort: Pulmonary effort is normal. No respiratory distress. Breath sounds: No stridor. Musculoskeletal:      Cervical back: Neck supple. Lymphadenopathy:      Cervical: No cervical adenopathy. Skin:     General: Skin is warm and dry. Neurological:      Mental Status: She is alert and oriented to person, place, and time. Cranial Nerves: No cranial nerve deficit. Psychiatric:         Behavior: Behavior normal.         Procedure     Rigid Nasal Endoscopy    Preop: Chronic sinusitis  Anes: topical lidocaine with afrin  Consent: verbal  Description:  After obtaining verbal consent from the patient 4% lidocaine with afrin was sprayed into the nasal cavities. After allowing a time for anesthesia, a nasal endoscope was placed into the naris. The septum, inferior, and middle turbinates were examined. The middle meatus, and sphenoethmoid recess was examined bilaterally. Sinus cavities healing well bilaterally. Mild crusting in the right ethmoid removed with forcep. Tolerated well without complication. Assessment and Plan     1. Chronic pansinusitis  Patient doing status post endoscopic sinus surgery. Sinus cavities are healing well. Minimal inflammation noted. She is going to a trial off of her nasal steroid for couple weeks to see if it is helping at all and if she needs to continue it. If symptoms worsen, she should restart it. I do believe she has experienced some relief of her vestibular migraine symptoms from the surgery. Her nasal obstruction has improved as well. She does continue to complain of some mild congestion on the left side. She does have a rightward septal deviation and enlarged turbinates. Discussed possibility of septoplasty and turbinate reduction. If after 3 more months she is experiencing persistent symptoms and would like intervention I recommend follow-up. Otherwise follow-up as needed. 2. Vestibular migraine      3. Dizziness      4. DNS (deviated nasal septum)      5. Nasal turbinate hypertrophy        Return if symptoms worsen or fail to improve. Portions of this note were dictated using Dragon.  There may be linguistic errors secondary to the use of this program.

## 2022-08-31 NOTE — PROGRESS NOTES
Roya Rodriguez (:  1995) is a 33 y.o. female, here for evaluation of the following chief complaint(s):    Follow-up (Medication Follow up ( pt is doing well on the medication but still has concerns-increased )      ASSESSMENT/PLAN:  1. Generalized anxiety disorder  Doing well on Zoloft but want to keep her to a minimal dose due to the potential for drug interaction with Uro-MP  2. Crohn's disease of small intestine without complication ([address]  -     Vitamin B12; Future  -     Vitamin D 25 Hydroxy; Future  B12 deficiency  Has been inconsistent was supplement. Check labs consistently either on or off to determine if that is needed. Crohn's disease of small intestine without complication ([address]  Patient's main symptom is persistent bloating. Not currently taking any specific medication for Crohn's except Protonix    Interstitial cystitis-takes Gemtesa, Macrobid, Pyridium Hiprex and Uro-MP which can interfere with her Zoloft. Advised FU with Dr Wes Kaiser:  HPI  Patient is here to follow-up anxiety. She states Zoloft at 25 mg is working well for her. She notes more of an effect than 12.5 mg. She asked if it was possible to increase to 50 mg but I told her I had concerns due to the Uro-MP (Methanamine) that she takes and is very effective for her interstitial cystitis. She has been seeing a therapist.  She reports feelings of \"emptiness. \"  They seem to be somewhat worse around her menstrual cycle.     Rock-7  of 4  Phq-9 of 19    Review of Systems    Past Medical History:   Diagnosis Date    Abdominal bloating     h pylori pos    Anxiety 2018    B12 deficiency     Chronic sinusitis     path report - dense \"eosinophilic infiltrate\"    [id number]-51 2020    Crohn's disease of small intestine ([address] 2021    GERD (gastroesophageal reflux disease)     Non-allergic rhinitis     saw allergist dr Bean Cristina, negative for trees, grasses, molds    Overactive bladder

## 2022-09-01 ENCOUNTER — OFFICE VISIT (OUTPATIENT)
Dept: INTERNAL MEDICINE CLINIC | Age: 27
End: 2022-09-01
Payer: COMMERCIAL

## 2022-09-01 VITALS
HEART RATE: 108 BPM | WEIGHT: 102.2 LBS | SYSTOLIC BLOOD PRESSURE: 120 MMHG | BODY MASS INDEX: 19.96 KG/M2 | DIASTOLIC BLOOD PRESSURE: 74 MMHG | OXYGEN SATURATION: 97 %

## 2022-09-01 DIAGNOSIS — F41.1 GENERALIZED ANXIETY DISORDER: Primary | ICD-10-CM

## 2022-09-01 DIAGNOSIS — N30.10 INTERSTITIAL CYSTITIS: ICD-10-CM

## 2022-09-01 DIAGNOSIS — K50.00 CROHN'S DISEASE OF SMALL INTESTINE WITHOUT COMPLICATION (HCC): ICD-10-CM

## 2022-09-01 PROCEDURE — 99214 OFFICE O/P EST MOD 30 MIN: CPT | Performed by: INTERNAL MEDICINE

## 2022-09-01 RX ORDER — SERTRALINE HYDROCHLORIDE 25 MG/1
25 TABLET, FILM COATED ORAL DAILY
Qty: 30 TABLET | Refills: 0
Start: 2022-09-01 | End: 2022-09-06

## 2022-09-01 RX ORDER — ESTRADIOL 0.1 MG/G
0.5 CREAM VAGINAL
COMMUNITY
Start: 2022-06-22

## 2022-09-01 RX ORDER — PHENAZOPYRIDINE HYDROCHLORIDE 200 MG/1
200 TABLET, FILM COATED ORAL DAILY PRN
COMMUNITY
Start: 2022-07-21

## 2022-09-01 RX ORDER — METHENAMINE HIPPURATE 1000 MG/1
1 TABLET ORAL 2 TIMES DAILY
COMMUNITY
Start: 2022-06-23

## 2022-09-01 NOTE — PATIENT INSTRUCTIONS
Check labs either 4 wks on or off b12    Rubin Rehman urogyAzucena Anderson -gyn    HPV vaccines at pharmacy    Labs in 2- 4 weeks

## 2022-09-06 RX ORDER — SERTRALINE HYDROCHLORIDE 25 MG/1
TABLET, FILM COATED ORAL
Qty: 30 TABLET | Refills: 1 | Status: SHIPPED | OUTPATIENT
Start: 2022-09-06 | End: 2022-09-08 | Stop reason: SDUPTHER

## 2022-09-08 RX ORDER — SERTRALINE HYDROCHLORIDE 25 MG/1
TABLET, FILM COATED ORAL
Qty: 90 TABLET | Refills: 0 | Status: SHIPPED | OUTPATIENT
Start: 2022-09-08 | End: 2022-10-03

## 2022-09-08 NOTE — TELEPHONE ENCOUNTER
Pt's pharmacy is requesting a 90 day refill on the pended medication    The refill was sent on 9/6/2022

## 2022-09-11 PROBLEM — N30.10 INTERSTITIAL CYSTITIS: Status: ACTIVE | Noted: 2022-09-11
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
## 2022-10-03 ENCOUNTER — OFFICE VISIT (OUTPATIENT)
Dept: PSYCHIATRY | Age: 27
End: 2022-10-03
Payer: COMMERCIAL

## 2022-10-03 VITALS
WEIGHT: 107 LBS | DIASTOLIC BLOOD PRESSURE: 70 MMHG | OXYGEN SATURATION: 98 % | BODY MASS INDEX: 20.9 KG/M2 | SYSTOLIC BLOOD PRESSURE: 118 MMHG | HEART RATE: 100 BPM

## 2022-10-03 DIAGNOSIS — F40.10 SOCIAL ANXIETY DISORDER: Primary | ICD-10-CM

## 2022-10-03 DIAGNOSIS — F33.1 MODERATE EPISODE OF RECURRENT MAJOR DEPRESSIVE DISORDER (HCC): ICD-10-CM

## 2022-10-03 PROCEDURE — 90833 PSYTX W PT W E/M 30 MIN: CPT | Performed by: STUDENT IN AN ORGANIZED HEALTH CARE EDUCATION/TRAINING PROGRAM

## 2022-10-03 PROCEDURE — 99204 OFFICE O/P NEW MOD 45 MIN: CPT | Performed by: STUDENT IN AN ORGANIZED HEALTH CARE EDUCATION/TRAINING PROGRAM

## 2022-10-03 ASSESSMENT — ANXIETY QUESTIONNAIRES
7. FEELING AFRAID AS IF SOMETHING AWFUL MIGHT HAPPEN: 1
5. BEING SO RESTLESS THAT IT IS HARD TO SIT STILL: 0
1. FEELING NERVOUS, ANXIOUS, OR ON EDGE: 0
IF YOU CHECKED OFF ANY PROBLEMS ON THIS QUESTIONNAIRE, HOW DIFFICULT HAVE THESE PROBLEMS MADE IT FOR YOU TO DO YOUR WORK, TAKE CARE OF THINGS AT HOME, OR GET ALONG WITH OTHER PEOPLE: SOMEWHAT DIFFICULT
2. NOT BEING ABLE TO STOP OR CONTROL WORRYING: 1
6. BECOMING EASILY ANNOYED OR IRRITABLE: 1
GAD7 TOTAL SCORE: 4
4. TROUBLE RELAXING: 0
3. WORRYING TOO MUCH ABOUT DIFFERENT THINGS: 1

## 2022-10-03 ASSESSMENT — PATIENT HEALTH QUESTIONNAIRE - PHQ9
5. POOR APPETITE OR OVEREATING: 1
8. MOVING OR SPEAKING SO SLOWLY THAT OTHER PEOPLE COULD HAVE NOTICED. OR THE OPPOSITE, BEING SO FIGETY OR RESTLESS THAT YOU HAVE BEEN MOVING AROUND A LOT MORE THAN USUAL: 0
SUM OF ALL RESPONSES TO PHQ9 QUESTIONS 1 & 2: 2
6. FEELING BAD ABOUT YOURSELF - OR THAT YOU ARE A FAILURE OR HAVE LET YOURSELF OR YOUR FAMILY DOWN: 2
2. FEELING DOWN, DEPRESSED OR HOPELESS: 1
SUM OF ALL RESPONSES TO PHQ QUESTIONS 1-9: 10
10. IF YOU CHECKED OFF ANY PROBLEMS, HOW DIFFICULT HAVE THESE PROBLEMS MADE IT FOR YOU TO DO YOUR WORK, TAKE CARE OF THINGS AT HOME, OR GET ALONG WITH OTHER PEOPLE: 1
7. TROUBLE CONCENTRATING ON THINGS, SUCH AS READING THE NEWSPAPER OR WATCHING TELEVISION: 1
4. FEELING TIRED OR HAVING LITTLE ENERGY: 1
SUM OF ALL RESPONSES TO PHQ QUESTIONS 1-9: 10
SUM OF ALL RESPONSES TO PHQ QUESTIONS 1-9: 10
9. THOUGHTS THAT YOU WOULD BE BETTER OFF DEAD, OR OF HURTING YOURSELF: 0
SUM OF ALL RESPONSES TO PHQ QUESTIONS 1-9: 10
3. TROUBLE FALLING OR STAYING ASLEEP: 3
1. LITTLE INTEREST OR PLEASURE IN DOING THINGS: 1

## 2022-10-03 ASSESSMENT — ENCOUNTER SYMPTOMS
EYES NEGATIVE: 1
ALLERGIC/IMMUNOLOGIC NEGATIVE: 1
RESPIRATORY NEGATIVE: 1
GASTROINTESTINAL NEGATIVE: 1

## 2022-10-26 ENCOUNTER — HOSPITAL ENCOUNTER (EMERGENCY)
Age: 27
Discharge: HOME | End: 2022-10-26
Attending: EMERGENCY MEDICINE
Payer: COMMERCIAL

## 2022-10-26 VITALS
RESPIRATION RATE: 14 BRPM | TEMPERATURE: 98.1 F | DIASTOLIC BLOOD PRESSURE: 76 MMHG | OXYGEN SATURATION: 100 % | HEART RATE: 98 BPM | WEIGHT: 105.8 LBS | BODY MASS INDEX: 20.77 KG/M2 | HEIGHT: 60 IN | SYSTOLIC BLOOD PRESSURE: 140 MMHG

## 2022-10-26 DIAGNOSIS — T15.92XA FOREIGN BODY OF LEFT EYE, INITIAL ENCOUNTER: Primary | ICD-10-CM

## 2022-10-26 PROCEDURE — 65205 REMOVE FOREIGN BODY FROM EYE: CPT

## 2022-10-26 PROCEDURE — 99282 EMERGENCY DEPT VISIT SF MDM: CPT

## 2022-10-26 ASSESSMENT — PAIN DESCRIPTION - DESCRIPTORS: DESCRIPTORS: BURNING;THROBBING

## 2022-10-26 ASSESSMENT — PAIN - FUNCTIONAL ASSESSMENT: PAIN_FUNCTIONAL_ASSESSMENT: 0-10

## 2022-10-26 ASSESSMENT — PAIN DESCRIPTION - FREQUENCY: FREQUENCY: CONTINUOUS

## 2022-10-26 ASSESSMENT — PAIN SCALES - GENERAL: PAINLEVEL_OUTOF10: 7

## 2022-10-26 ASSESSMENT — PAIN DESCRIPTION - PAIN TYPE: TYPE: ACUTE PAIN

## 2022-10-26 ASSESSMENT — PAIN DESCRIPTION - ORIENTATION: ORIENTATION: LEFT

## 2022-10-27 NOTE — ED PROVIDER NOTES
[address] PROVIDER NOTE    Patient Identification  Pt Name: Duane Quest  MRN: [id number]  [place] 1995  Date of evaluation: 10/26/2022  Provider: Javier Costa MD  PCP: Herminia Amanda MD    HPI  Duane Quest is a 33 y.o. female who presents to the ED for left eye injury, had put pepper flakes on her pizza and it may have gotten into her eye. .     No other complaints, modifying factors or associated symptoms. Nursing notes reviewed.      Allergies: Augmentin [amoxicillin-pot clavulanate]    Past medical history:   Past Medical History:   Diagnosis Date    Abdominal bloating     h pylori pos    Anxiety 03/01/2018    B12 deficiency     Chronic sinusitis     path report - dense \"eosinophilic infiltrate\"    COUPX-10 12/17/2020    Crohn's disease of small intestine ([facility] [address] 05/2021    GERD (gastroesophageal reflux disease)     Interstitial cystitis     Moderate episode of recurrent major depressive disorder ([facility] [address] 10/3/2022    Non-allergic rhinitis     saw allergist dr Kavon Russell, negative for trees, grasses, molds    Overactive bladder     Tachycardia 01/28/208    ectopic atrial rhythm, inappropriate sinus tachycardia    Urge incontinence     interstial cystitis - dr Oma pineda- urogyn-pelvic floor rehab       Past surgical history:   Past Surgical History:   Procedure Laterality Date    COLONOSCOPY  04/29/2021    terminal ileum ulcers, path pdg-possible crohn's    COLPOSCOPY      for ascus - [facility]    CYSTOSCOPY      SEPTOPLASTY Bilateral 4/25/2022    EXCISION ALBINO BULLOSA LEFT/ BILATERAL MAXILLARY ANTROSTOMY AND BILATERAL TOTAL ETHMOIDECTOMY performed by Lilli Merino DO at [address]  04/29/2021    mild erythema-gastric antrum, grade a reflux esophagitis    WISDOM TOOTH EXTRACTION         Home medications:   Discharge Medication List as of 10/26/2022 10:33 PM        CONTINUE these medications which have NOT CHANGED Details   sertraline (ZOLOFT) 50 MG tablet TAKE 1 TABLET BY MOUTH EVERY DAY, Disp-90 tablet, R-2Normal      phenazopyridine (PYRIDIUM) 200 MG tablet Take 200 mg by mouth daily as neededHistorical Med      estradiol (ESTRACE) 0.1 MG/GM vaginal cream Place 0.5 g vaginally Twice a WeekHistorical Med      methenamine (HIPREX) 1 g tablet Take 1 g by mouth in the morning and at bedtimeHistorical Med      Vibegron (GEMTESA) 75 MG TABS 75 mgHistorical Med      nitrofurantoin, macrocrystal-monohydrate, (MACROBID) 100 MG capsule Take 100 mg by mouth as neededHistorical Med      D-Mannose 500 MG CAPS Take by mouthHistorical Med      pantoprazole (PROTONIX) 40 MG tablet TAKE 1 TABLET BY MOUTH EVERY MORNINGHistorical Med      vitamin B-12 (CYANOCOBALAMIN) 500 MCG tablet Take 1 tablet by mouth daily, Disp-30 tablet, R-3OTC      NEXPLANON 68 MG implant DAWHistorical Med             Social history:  reports that she has never smoked. She has never used smokeless tobacco. She reports current alcohol use of about 4.0 - 6.0 standard drinks per week. She reports that she does not use drugs. Family history:    Family History   Problem Relation Age of Onset    High Blood Pressure Father     Depression Father     Diabetes type 2  Maternal Grandmother     Breast Cancer Maternal Aunt     Depression Paternal Uncle        REVIEW OF SYSTEMS  Review of Systems      PHYSICAL EXAM  BP (!) 140/76   Pulse 98   Temp 98.1 °F (36.7 °C) (Oral)   Resp 14   Ht 5' (1.524 m)   Wt 105 lb 12.8 oz (48 kg)   SpO2 100%   BMI 20.66 kg/m²     Physical Exam  Vitals and nursing note reviewed. Constitutional:       Appearance: Normal appearance. She is well-developed. She is not ill-appearing. HENT:      Head: Normocephalic and atraumatic. Right Ear: External ear normal.      Left Ear: External ear normal.      Nose: Nose normal.   Eyes:      General: No scleral icterus. Right eye: No discharge. Left eye: No discharge. Extraocular Movements: Extraocular movements intact. Pupils: Pupils are equal, round, and reactive to light. Comments: Conjunctival injection, pH is normal and no corneal abrasions   Pulmonary:      Effort: Pulmonary effort is normal. No respiratory distress. Musculoskeletal:      Cervical back: Neck supple. Skin:     Coloration: Skin is not pale. Neurological:      Mental Status: She is alert. Psychiatric:         Mood and Affect: Mood normal.         Behavior: Behavior normal.         PROCEDURES    Foreign body removal:  The left eyes irrigated with normal saline after numbing with tetracaine solution. The eyelid is everted using a Q-tip. Initially I do not appreciate any foreign body. Patient later felt like a foreign body had moved lateral canthus region. Upon reexamination I do remove what appears to be a pepper flake from her eye. The eye is again irrigated. She tolerated the procedure well with no complications. Her symptoms did improve    EKG:    RADIOLOGY  No orders to display          LABS  Labs Reviewed - No data to display    ED COURSE/MDM  Patient seen and evaluated. Foreign body was removed from the eye with improvement of her symptoms. No signs of corneal abrasion and normal pH of the eye I do feel that Hu Hidalgo can be safely discharged home due to being low risk for serious or life-threatening conditions. Outpatient follow-up  recommended. Return instructions discussed. Patient and or family expresses understanding and is in agreement with plan. Is this patient to be included in the SEP-1 Core Measure due to severe sepsis or septic shock? No   Exclusion criteria - the patient is NOT to be included for SEP-1 Core Measure due to:   Infection is not suspected       Patient Referrals:  [address] [place] [address]  [phone]    Schedule an appointment as soon as possible for a visit       Discharge Medications:  Discharge Medication List as of 10/26/2022 10:33 PM          FINAL IMPRESSION  1. Foreign body of left eye, initial encounter        Blood pressure (!) 140/76, pulse 98, temperature 98.1 °F (36.7 °C), temperature source Oral, resp. rate 14, height 5' (1.524 m), weight 105 lb 12.8 oz (48 kg), SpO2 100 %, not currently breastfeeding. DISPOSITION  DISPOSITION Decision To Discharge 10/26/2022 10:24:24 PM        Electronically signed by: Lorie Lennon M.D. I am the primary clinician of record. This chart was generated using the [address] [place] [address] dictation system. I created this record but it may contain dictation errors given the limitations of this technology.         Heaven Bravo MD  10/27/22 [id number]

## 2022-11-16 ENCOUNTER — TELEPHONE (OUTPATIENT)
Dept: INTERNAL MEDICINE CLINIC | Age: 27
End: 2022-11-16

## 2022-11-16 NOTE — TELEPHONE ENCOUNTER
----- Message from Cecily Schilling sent at 11/16/2022  4:52 PM EST -----  Subject: Message to Provider    QUESTIONS  Information for Provider? pt is needing to cancel her appt with Dr Uli Cardozo, unable to cancel.   ---------------------------------------------------------------------------  --------------  Jacky MUELLER  [phone]; OK to respond with electronic message via [employer] portal (only   for patients who have registered [employer] account)  ---------------------------------------------------------------------------  --------------  SCRIPT ANSWERS  Relationship to Patient?  Self

## 2023-01-13 ENCOUNTER — HOSPITAL ENCOUNTER (OUTPATIENT)
Dept: NUCLEAR MEDICINE | Age: 28
Discharge: HOME | End: 2023-01-13

## 2023-01-13 ENCOUNTER — HOSPITAL ENCOUNTER (OUTPATIENT)
Dept: NUCLEAR MEDICINE | Age: 28
Discharge: HOME | End: 2023-01-13
Payer: COMMERCIAL

## 2023-01-13 DIAGNOSIS — R63.4 UNINTENTIONAL WEIGHT LOSS: ICD-10-CM

## 2023-01-13 DIAGNOSIS — R10.10 UPPER ABDOMINAL PAIN: ICD-10-CM

## 2023-01-13 DIAGNOSIS — R68.81 EARLY SATIETY: ICD-10-CM

## 2023-01-13 PROCEDURE — A9541 TC99M SULFUR COLLOID: HCPCS | Performed by: INTERNAL MEDICINE

## 2023-01-13 PROCEDURE — 78264 GASTRIC EMPTYING IMG STUDY: CPT

## 2023-01-13 PROCEDURE — 3430000000 HC RX DIAGNOSTIC RADIOPHARMACEUTICAL: Performed by: INTERNAL MEDICINE

## 2023-01-13 RX ADMIN — Medication 0.8 MILLICURIE: at 09:00

## 2023-05-08 DIAGNOSIS — J32.4 CHRONIC PANSINUSITIS: ICD-10-CM

## 2023-05-08 NOTE — TELEPHONE ENCOUNTER
Left message for patient to call back because she was going to try to stay off of the flonase for a couple of weeks

## 2023-05-11 RX ORDER — FLUTICASONE PROPIONATE 50 MCG
SPRAY, SUSPENSION (ML) NASAL
Qty: 1 EACH | Refills: 11 | Status: SHIPPED | OUTPATIENT
Start: 2023-05-11

## 2023-05-26 ENCOUNTER — TELEPHONE (OUTPATIENT)
Dept: ENT CLINIC | Age: 28
End: 2023-05-26

## 2023-05-26 DIAGNOSIS — J32.4 CHRONIC PANSINUSITIS: Primary | ICD-10-CM

## 2023-05-26 RX ORDER — DOXYCYCLINE HYCLATE 100 MG
100 TABLET ORAL 2 TIMES DAILY
Qty: 28 TABLET | Refills: 0 | Status: SHIPPED | OUTPATIENT
Start: 2023-05-26 | End: 2023-06-09

## 2023-05-26 NOTE — TELEPHONE ENCOUNTER
Spoke with patient and called elliot and asked them to call the [facility] for the prescription

## 2023-05-26 NOTE — TELEPHONE ENCOUNTER
Sinuses pressure and light headed, bloody nose left nostril past couple of days, she is concerned since having sinus surgery last year. Last nose bleed this morning, when she blows her nose she will see blood or if it is running.  She would like to know when she should come in or go to the ED, she is at work until 6 so either voice mail or my chart will work    Please advise

## 2023-05-31 DIAGNOSIS — J32.4 CHRONIC PANSINUSITIS: ICD-10-CM

## 2023-05-31 RX ORDER — FLUTICASONE PROPIONATE 50 MCG
SPRAY, SUSPENSION (ML) NASAL
Qty: 3 EACH | Refills: 2 | Status: SHIPPED | OUTPATIENT
Start: 2023-05-31

## 2023-09-20 ENCOUNTER — OFFICE VISIT (OUTPATIENT)
Dept: ENT CLINIC | Age: 28
End: 2023-09-20
Payer: COMMERCIAL

## 2023-09-20 VITALS
TEMPERATURE: 97.8 F | BODY MASS INDEX: 23.44 KG/M2 | WEIGHT: 119.4 LBS | DIASTOLIC BLOOD PRESSURE: 79 MMHG | HEIGHT: 60 IN | HEART RATE: 106 BPM | SYSTOLIC BLOOD PRESSURE: 138 MMHG

## 2023-09-20 DIAGNOSIS — J30.9 ALLERGIC RHINITIS, UNSPECIFIED SEASONALITY, UNSPECIFIED TRIGGER: ICD-10-CM

## 2023-09-20 DIAGNOSIS — J31.0 NON-ALLERGIC RHINITIS: ICD-10-CM

## 2023-09-20 DIAGNOSIS — Z98.890 HISTORY OF ENDOSCOPIC SINUS SURGERY: ICD-10-CM

## 2023-09-20 DIAGNOSIS — R04.0 EPISTAXIS: Primary | ICD-10-CM

## 2023-09-20 DIAGNOSIS — R42 LIGHT HEADED: ICD-10-CM

## 2023-09-20 PROCEDURE — 95028 IQ TSTS ALLERGY DELAYED RXN: CPT | Performed by: OTOLARYNGOLOGY

## 2023-09-20 PROCEDURE — 95024 IQ TESTS W/ALLERGENIC XTRCS: CPT | Performed by: OTOLARYNGOLOGY

## 2023-09-20 PROCEDURE — 99213 OFFICE O/P EST LOW 20 MIN: CPT | Performed by: OTOLARYNGOLOGY

## 2023-09-20 RX ORDER — UBIDECARENONE 75 MG
50 CAPSULE ORAL DAILY
COMMUNITY

## 2023-09-20 RX ORDER — MOMETASONE FUROATE 50 UG/1
2 SPRAY, METERED NASAL DAILY
Qty: 3 EACH | Refills: 3 | Status: SHIPPED | OUTPATIENT
Start: 2023-09-20

## 2023-09-20 ASSESSMENT — ENCOUNTER SYMPTOMS
SINUS PAIN: 0
EYE ITCHING: 0
EYE PAIN: 0
COUGH: 0
DIARRHEA: 0
SORE THROAT: 0
NAUSEA: 0
EYE REDNESS: 0
SINUS PRESSURE: 1
PHOTOPHOBIA: 0
TROUBLE SWALLOWING: 0
SHORTNESS OF BREATH: 0
COLOR CHANGE: 0
STRIDOR: 0
VOICE CHANGE: 0
CHOKING: 0
RHINORRHEA: 0
FACIAL SWELLING: 0

## 2023-09-20 NOTE — PROGRESS NOTES
[facility]  [address] [place], [address] [facility], [address] [place] [address]  P: [phone]  F: [phone]       Patient     Jania Rust  1995    ChiefComplaint     Chief Complaint   Patient presents with    Epistaxis     Her last nosebleed was about 1 week ago. She just applied tissues to stop the bleeding. The bleeding lasted less than 5 minutes. She is also having some nasal congestion    Dizziness     She is feeling lightheaded. No room spinning but she feels like she is spinning more lightheaded. History of Present Illness     Chela Villagran is a pleasant 33 y.o. female known to me presents for issue of epistaxis. About a week ago, she had a nosebleed that lasted about 5 minutes. From the left nostril. She been experiencing some congestion. She uses fluticasone daily. She was allergy tested about 5 years ago which was negative. She is not taking any antihistamines. Has additional complaint of feeling lightheaded and slightly off balance. She also experiences some brain fog. She is never been diagnosed with migraine.     Past Medical History     Past Medical History:   Diagnosis Date    Abdominal bloating     h pylori pos    Anxiety 03/01/2018    B12 deficiency     Chronic sinusitis     path report - dense \"eosinophilic infiltrate\"    [facility]    Crohn's disease of small intestine ([address]) 05/2021    Dizziness     GERD (gastroesophageal reflux disease)     Interstitial cystitis     Moderate episode of recurrent major depressive disorder ([address]) 10/03/2022    Non-allergic rhinitis     saw allergist dr Anai Chauhan, negative for trees, grasses, molds    Nosebleed     Overactive bladder     Tachycardia 01/28/208    ectopic atrial rhythm, inappropriate sinus tachycardia    Tinnitus     Urge incontinence     interstial cystitis - dr Clari pineda- urogyn-pelvic floor rehab       Past Surgical History     Past Surgical History:   Procedure Laterality Date    COLONOSCOPY 98

## 2023-09-28 ENCOUNTER — TELEPHONE (OUTPATIENT)
Dept: ENT CLINIC | Age: 28
End: 2023-09-28

## 2023-09-28 RX ORDER — PYRITHIONE ZINC 1 G/ML
1 LOTION/SHAMPOO TOPICAL DAILY
COMMUNITY

## 2023-09-28 NOTE — TELEPHONE ENCOUNTER
Asked by Dr. Cintia Valdez to discuss allergy testing with this patient. Allergy Welcome Packet given to patient in the office and contents reviewed over the phone. Patient will call to check insurance coverage. Pt verb understanding to stop taking all antihistamines 7 days prior to testing. Not currently taking any. Allergy testing scheduled for 10/12/23.

## 2023-10-10 DIAGNOSIS — F33.1 MODERATE EPISODE OF RECURRENT MAJOR DEPRESSIVE DISORDER (HCC): ICD-10-CM

## 2023-10-11 NOTE — PROGRESS NOTES
PSYCHIATRY PROGRESS NOTE    Nata Alonso  1995  10/12/2023  Face to Face time: 55 minutes, of which 30 minutes were spent in supportive psychotherapy  PCP: Juan Garcia MD    CC:   Chief Complaint   Patient presents with    Follow-up       Patient is a 33 y.o. female with a past medical history significant for Crohn's disease and chronic pansinusitis who presents to the outpatient psychiatric clinic today for evaluation management of depression and anxiety. A:  Patient's presentation today is indicative of interval worsening of her depression despite an increase to her medication. There is concern that the patient could be having AE secondary to the medication change. She additionally has concerns of ADHD that were not fully addressed during the visit today. We will continue to follow and provide support. Diagnosis:  Social anxiety disorder, mild  MDD-R moderate    P:   Decrease sertraline to 25mg daily. Patient to contact Dr. Shine Medina via [place] if this intervention does not alleviate depressive sx. If they still remain, would strongly consider either swapping to Lexapro or usage of bupropion. Medication Monitoring:    - PDMP reviewed: No current prescriptions     Follow-up: 5 weeks    Safety: Pt was counseled on the potential for increased suicidal ideations and advised on potential options for dealing with these including hotlines, calling the office, or going to the nearest emergency room. __________________________________________________________________________    S:   Patient indicated that a lot had gone on since she was last seen. She's in a new location for her job, struggling a little bit with that. She started taking some classes in August, doing ok with those but they're on top of the job. She's talking about moving in with her boyfriend here shortly, excited for that.     The patient has noted that in the past 2-3 months that things have gotten markedly

## 2023-10-12 ENCOUNTER — OFFICE VISIT (OUTPATIENT)
Dept: PSYCHIATRY | Age: 28
End: 2023-10-12

## 2023-10-12 VITALS
BODY MASS INDEX: 23.56 KG/M2 | HEART RATE: 80 BPM | WEIGHT: 120 LBS | HEIGHT: 60 IN | DIASTOLIC BLOOD PRESSURE: 76 MMHG | SYSTOLIC BLOOD PRESSURE: 106 MMHG

## 2023-10-12 DIAGNOSIS — F33.1 MODERATE EPISODE OF RECURRENT MAJOR DEPRESSIVE DISORDER (HCC): Primary | ICD-10-CM

## 2023-10-12 DIAGNOSIS — F40.10 SOCIAL ANXIETY DISORDER: ICD-10-CM

## 2023-10-12 ASSESSMENT — ANXIETY QUESTIONNAIRES
GAD7 TOTAL SCORE: 9
3. WORRYING TOO MUCH ABOUT DIFFERENT THINGS: 2
6. BECOMING EASILY ANNOYED OR IRRITABLE: 2
7. FEELING AFRAID AS IF SOMETHING AWFUL MIGHT HAPPEN: 1
5. BEING SO RESTLESS THAT IT IS HARD TO SIT STILL: 0
1. FEELING NERVOUS, ANXIOUS, OR ON EDGE: 1
2. NOT BEING ABLE TO STOP OR CONTROL WORRYING: 2
4. TROUBLE RELAXING: 1

## 2023-10-12 ASSESSMENT — PATIENT HEALTH QUESTIONNAIRE - PHQ9
SUM OF ALL RESPONSES TO PHQ QUESTIONS 1-9: 11
1. LITTLE INTEREST OR PLEASURE IN DOING THINGS: 1
3. TROUBLE FALLING OR STAYING ASLEEP: 3
2. FEELING DOWN, DEPRESSED OR HOPELESS: 2
7. TROUBLE CONCENTRATING ON THINGS, SUCH AS READING THE NEWSPAPER OR WATCHING TELEVISION: 1
5. POOR APPETITE OR OVEREATING: 1
6. FEELING BAD ABOUT YOURSELF - OR THAT YOU ARE A FAILURE OR HAVE LET YOURSELF OR YOUR FAMILY DOWN: 1
9. THOUGHTS THAT YOU WOULD BE BETTER OFF DEAD, OR OF HURTING YOURSELF: 1
SUM OF ALL RESPONSES TO PHQ QUESTIONS 1-9: 11
SUM OF ALL RESPONSES TO PHQ QUESTIONS 1-9: 11
SUM OF ALL RESPONSES TO PHQ9 QUESTIONS 1 & 2: 3
8. MOVING OR SPEAKING SO SLOWLY THAT OTHER PEOPLE COULD HAVE NOTICED. OR THE OPPOSITE, BEING SO FIGETY OR RESTLESS THAT YOU HAVE BEEN MOVING AROUND A LOT MORE THAN USUAL: 0
SUM OF ALL RESPONSES TO PHQ QUESTIONS 1-9: 10
10. IF YOU CHECKED OFF ANY PROBLEMS, HOW DIFFICULT HAVE THESE PROBLEMS MADE IT FOR YOU TO DO YOUR WORK, TAKE CARE OF THINGS AT HOME, OR GET ALONG WITH OTHER PEOPLE: 1
4. FEELING TIRED OR HAVING LITTLE ENERGY: 1

## 2023-10-12 ASSESSMENT — ENCOUNTER SYMPTOMS
GASTROINTESTINAL NEGATIVE: 1
RESPIRATORY NEGATIVE: 1
EYES NEGATIVE: 1
ALLERGIC/IMMUNOLOGIC NEGATIVE: 1

## 2023-10-12 NOTE — PROGRESS NOTES
Patient presents for an OV. Patient has not been seen in awhile and complains of depression. Patient stated that she wants to change the dosage of her Zoloft.   She stated that she isn't sure if that's the reason of the depression getting worst.  She admits to starting a new job at [employer] and is a little stressed about that     PHQ:11  RUTH:9

## 2023-10-20 ENCOUNTER — TELEPHONE (OUTPATIENT)
Dept: INTERNAL MEDICINE CLINIC | Age: 28
End: 2023-10-20

## 2023-10-20 DIAGNOSIS — Z00.00 ROUTINE CHECK-UP: ICD-10-CM

## 2023-10-20 DIAGNOSIS — Z11.59 SCREENING FOR VIRAL DISEASE: Primary | ICD-10-CM

## 2023-10-20 DIAGNOSIS — E53.8 B12 DEFICIENCY: ICD-10-CM

## 2023-10-20 NOTE — TELEPHONE ENCOUNTER
Patient has an appointment for a physical in late November. She is requesting to have labs done prior to her visit. Please advise if these can ordered. Patient is aware that Dr. Silvia Goodell is out of the office.

## 2023-11-16 ENCOUNTER — OFFICE VISIT (OUTPATIENT)
Dept: PSYCHIATRY | Age: 28
End: 2023-11-16

## 2023-11-16 VITALS
DIASTOLIC BLOOD PRESSURE: 76 MMHG | HEART RATE: 76 BPM | WEIGHT: 123.2 LBS | SYSTOLIC BLOOD PRESSURE: 120 MMHG | BODY MASS INDEX: 24.06 KG/M2 | RESPIRATION RATE: 12 BRPM

## 2023-11-16 DIAGNOSIS — F40.10 SOCIAL ANXIETY DISORDER: ICD-10-CM

## 2023-11-16 DIAGNOSIS — F33.1 MODERATE EPISODE OF RECURRENT MAJOR DEPRESSIVE DISORDER (HCC): Primary | ICD-10-CM

## 2023-11-16 ASSESSMENT — PATIENT HEALTH QUESTIONNAIRE - PHQ9
SUM OF ALL RESPONSES TO PHQ QUESTIONS 1-9: 5
3. TROUBLE FALLING OR STAYING ASLEEP: 1
7. TROUBLE CONCENTRATING ON THINGS, SUCH AS READING THE NEWSPAPER OR WATCHING TELEVISION: 0
8. MOVING OR SPEAKING SO SLOWLY THAT OTHER PEOPLE COULD HAVE NOTICED. OR THE OPPOSITE, BEING SO FIGETY OR RESTLESS THAT YOU HAVE BEEN MOVING AROUND A LOT MORE THAN USUAL: 0
SUM OF ALL RESPONSES TO PHQ9 QUESTIONS 1 & 2: 1
10. IF YOU CHECKED OFF ANY PROBLEMS, HOW DIFFICULT HAVE THESE PROBLEMS MADE IT FOR YOU TO DO YOUR WORK, TAKE CARE OF THINGS AT HOME, OR GET ALONG WITH OTHER PEOPLE: 1
2. FEELING DOWN, DEPRESSED OR HOPELESS: 0
9. THOUGHTS THAT YOU WOULD BE BETTER OFF DEAD, OR OF HURTING YOURSELF: 1
SUM OF ALL RESPONSES TO PHQ QUESTIONS 1-9: 5
SUM OF ALL RESPONSES TO PHQ QUESTIONS 1-9: 4
1. LITTLE INTEREST OR PLEASURE IN DOING THINGS: 1
4. FEELING TIRED OR HAVING LITTLE ENERGY: 1
SUM OF ALL RESPONSES TO PHQ QUESTIONS 1-9: 5
5. POOR APPETITE OR OVEREATING: 1
6. FEELING BAD ABOUT YOURSELF - OR THAT YOU ARE A FAILURE OR HAVE LET YOURSELF OR YOUR FAMILY DOWN: 0

## 2023-11-16 ASSESSMENT — ANXIETY QUESTIONNAIRES
3. WORRYING TOO MUCH ABOUT DIFFERENT THINGS: 1
IF YOU CHECKED OFF ANY PROBLEMS ON THIS QUESTIONNAIRE, HOW DIFFICULT HAVE THESE PROBLEMS MADE IT FOR YOU TO DO YOUR WORK, TAKE CARE OF THINGS AT HOME, OR GET ALONG WITH OTHER PEOPLE: SOMEWHAT DIFFICULT
4. TROUBLE RELAXING: 1
GAD7 TOTAL SCORE: 6
5. BEING SO RESTLESS THAT IT IS HARD TO SIT STILL: 0
6. BECOMING EASILY ANNOYED OR IRRITABLE: 1
2. NOT BEING ABLE TO STOP OR CONTROL WORRYING: 1
1. FEELING NERVOUS, ANXIOUS, OR ON EDGE: 1
7. FEELING AFRAID AS IF SOMETHING AWFUL MIGHT HAPPEN: 1

## 2023-11-16 NOTE — PATIENT INSTRUCTIONS
[email or web address]  Click \"For Adults\"  Click \"Living with ADHD: A Lifespan Disorder\" on the left side column menu

## 2023-11-16 NOTE — PROGRESS NOTES
09/01/2022   Component Date Value Ref Range Status    PAP Smear, External 07/13/2022 negative   Final       EKG: 10/1/2019 QTc 432        Natasha Sparks MD  Psychiatrist

## 2023-12-05 SDOH — ECONOMIC STABILITY: FOOD INSECURITY: WITHIN THE PAST 12 MONTHS, YOU WORRIED THAT YOUR FOOD WOULD RUN OUT BEFORE YOU GOT MONEY TO BUY MORE.: NEVER TRUE

## 2023-12-05 SDOH — ECONOMIC STABILITY: HOUSING INSECURITY
IN THE LAST 12 MONTHS, WAS THERE A TIME WHEN YOU DID NOT HAVE A STEADY PLACE TO SLEEP OR SLEPT IN A SHELTER (INCLUDING NOW)?: NO

## 2023-12-05 SDOH — ECONOMIC STABILITY: TRANSPORTATION INSECURITY
IN THE PAST 12 MONTHS, HAS LACK OF TRANSPORTATION KEPT YOU FROM MEETINGS, WORK, OR FROM GETTING THINGS NEEDED FOR DAILY LIVING?: NO

## 2023-12-05 SDOH — ECONOMIC STABILITY: FOOD INSECURITY: WITHIN THE PAST 12 MONTHS, THE FOOD YOU BOUGHT JUST DIDN'T LAST AND YOU DIDN'T HAVE MONEY TO GET MORE.: NEVER TRUE

## 2023-12-05 SDOH — ECONOMIC STABILITY: INCOME INSECURITY: HOW HARD IS IT FOR YOU TO PAY FOR THE VERY BASICS LIKE FOOD, HOUSING, MEDICAL CARE, AND HEATING?: NOT HARD AT ALL

## 2023-12-06 ENCOUNTER — OFFICE VISIT (OUTPATIENT)
Dept: INTERNAL MEDICINE CLINIC | Age: 28
End: 2023-12-06
Payer: COMMERCIAL

## 2023-12-06 VITALS
DIASTOLIC BLOOD PRESSURE: 72 MMHG | WEIGHT: 123 LBS | BODY MASS INDEX: 24.02 KG/M2 | OXYGEN SATURATION: 99 % | SYSTOLIC BLOOD PRESSURE: 108 MMHG | HEART RATE: 110 BPM

## 2023-12-06 DIAGNOSIS — E53.8 B12 DEFICIENCY: ICD-10-CM

## 2023-12-06 DIAGNOSIS — R04.0 EPISTAXIS: ICD-10-CM

## 2023-12-06 DIAGNOSIS — N93.9 VAGINAL SPOTTING: ICD-10-CM

## 2023-12-06 DIAGNOSIS — K50.00 CROHN'S DISEASE OF SMALL INTESTINE WITHOUT COMPLICATION (HCC): ICD-10-CM

## 2023-12-06 DIAGNOSIS — Z11.59 SCREENING FOR VIRAL DISEASE: ICD-10-CM

## 2023-12-06 DIAGNOSIS — R09.81 NASAL CONGESTION: ICD-10-CM

## 2023-12-06 DIAGNOSIS — Z00.00 ROUTINE CHECK-UP: ICD-10-CM

## 2023-12-06 DIAGNOSIS — Z00.00 ENCOUNTER FOR WELL ADULT EXAM WITHOUT ABNORMAL FINDINGS: Primary | ICD-10-CM

## 2023-12-06 PROCEDURE — 99395 PREV VISIT EST AGE 18-39: CPT | Performed by: INTERNAL MEDICINE

## 2023-12-06 RX ORDER — M-VIT,TX,IRON,MINS/CALC/FOLIC 27MG-0.4MG
1 TABLET ORAL DAILY
COMMUNITY

## 2023-12-06 NOTE — PATIENT INSTRUCTIONS
Dr. Gonzalez Najera  Phone: [phone]    Allergist referral    Labs  Stool cups  ---       Well Visit, Ages 25 to 72: Care Instructions  Well visits can help you stay healthy. Your doctor has checked your overall health and may have suggested ways to take good care of yourself. Your doctor also may have recommended tests. You can help prevent illness with healthy eating, good sleep, vaccinations, regular exercise, and other steps. Get the tests that you and your doctor decide on. Depending on your age and risks, examples might include screening for diabetes; hepatitis C; HIV; and cervical, breast, lung, and colon cancer. Screening helps find diseases before any symptoms appear. Eat healthy foods. Choose fruits, vegetables, whole grains, lean protein, and low-fat dairy foods. Limit saturated fat and reduce salt. Limit alcohol. Men should have no more than 2 drinks a day. Women should have no more than 1. For some people, no alcohol is the best choice. Exercise. Get at least 30 minutes of exercise on most days of the week. Walking can be a good choice. Reach and stay at your healthy weight. This will lower your risk for many health problems. Take care of your mental health. Try to stay connected with friends, family, and community, and find ways to manage stress. If you're feeling depressed or hopeless, talk to someone. A counselor can help. If you don't have a counselor, talk to your doctor. Talk to your doctor if you think you may have a problem with alcohol or drug use. This includes prescription medicines and illegal drugs. Avoid tobacco and nicotine: Don't smoke, vape, or chew. If you need help quitting, talk to your doctor. Practice safer sex. Getting tested, using condoms or dental dams, and limiting sex partners can help prevent STIs. Use birth control if it's important to you to prevent pregnancy. Talk with your doctor about your choices and what might be best for you.    Prevent

## 2023-12-06 NOTE — PROGRESS NOTES
Well Adult Note  Name: Julissa Lopez Date: 2023   MRN: [id number] Sex: Female   Age: 33 y.o. Ethnicity: Non- / Non    : 1995 Race: Other      Kj Brian is here for well adult exam.  History:    Overall patient's lightheadedness has improved since his sinus surgery but not entirely gone and may be getting worse lately. She still gets some fatigue as well. She has also had nosebleeds. She has tried a different nasal spray on recommendation of ENT. He also advised that she see an allergist and she requests referral.    Patient states from a gastroenterology standpoint, she is not feeling her best.  She thinks that stress may be part of it. She states Dr. Raiza Lowe suggested she go back on budesonide. She has had some nausea and bloating. She may be due for colonoscopy and EGD. She does not think Zoloft made her symptoms worse. She does think it caused some weight gain. Patient requests pregnancy test.    Patient does report a history of a negative breath test but positive H. pylori. She has concerns regarding ADHD and requests psychiatry referral.    Review of Systems  Wt up 18 lbs since 10/22    Allergies   Allergen Reactions    Augmentin [Amoxicillin-Pot Clavulanate] Nausea And Vomiting         Prior to Visit Medications    Medication Sig Taking?  Authorizing Provider   Multiple Vitamins-Minerals (THERAPEUTIC MULTIVITAMIN-MINERALS) tablet Take 1 tablet by mouth daily Yes Yaquelin Harman MD   Metamucil Fiber CHEW Take 1 tablet by mouth daily Yes Yaquelin Harman MD   vitamin B-12 (CYANOCOBALAMIN) 100 MCG tablet Take 0.5 tablets by mouth daily Yes Yaquelin Harman MD   mometasone (NASONEX) 50 MCG/ACT nasal spray 2 sprays by Nasal route daily Yes Sriram Lucio DO   sertraline (ZOLOFT) 50 MG tablet TAKE 1 TABLET BY MOUTH EVERY DAY  Patient taking differently: Take 0.5 tablets by mouth daily Yes Petra Juarez MD   Vibegron (GEMTESA) 75 MG TABS 1

## 2023-12-07 LAB
25(OH)D3 SERPL-MCNC: 28.7 NG/ML
ALBUMIN SERPL-MCNC: 5.1 G/DL (ref 3.4–5)
ALBUMIN/GLOB SERPL: 1.9 {RATIO} (ref 1.1–2.2)
ALP SERPL-CCNC: 120 U/L (ref 40–129)
ALT SERPL-CCNC: 17 U/L (ref 10–40)
ANION GAP SERPL CALCULATED.3IONS-SCNC: 11 MMOL/L (ref 3–16)
AST SERPL-CCNC: 20 U/L (ref 15–37)
B-HCG SERPL EIA 3RD IS-ACNC: <5 MIU/ML
BASOPHILS # BLD: 0.1 K/UL (ref 0–0.2)
BASOPHILS NFR BLD: 0.7 %
BILIRUB SERPL-MCNC: 0.3 MG/DL (ref 0–1)
BUN SERPL-MCNC: 14 MG/DL (ref 7–20)
CALCIUM SERPL-MCNC: 9.7 MG/DL (ref 8.3–10.6)
CHLORIDE SERPL-SCNC: 101 MMOL/L (ref 99–110)
CHOLEST SERPL-MCNC: 199 MG/DL (ref 0–199)
CO2 SERPL-SCNC: 25 MMOL/L (ref 21–32)
CREAT SERPL-MCNC: 0.6 MG/DL (ref 0.6–1.1)
DEPRECATED RDW RBC AUTO: 15.3 % (ref 12.4–15.4)
EOSINOPHIL # BLD: 0 K/UL (ref 0–0.6)
EOSINOPHIL NFR BLD: 0.3 %
ERYTHROCYTE [SEDIMENTATION RATE] IN BLOOD BY WESTERGREN METHOD: 23 MM/HR (ref 0–20)
GFR SERPLBLD CREATININE-BSD FMLA CKD-EPI: >60 ML/MIN/{1.73_M2}
GLUCOSE SERPL-MCNC: 103 MG/DL (ref 70–99)
HBV SURFACE AB SERPL IA-ACNC: 56.77 MIU/ML
HCT VFR BLD AUTO: 36 % (ref 36–48)
HDLC SERPL-MCNC: 89 MG/DL (ref 40–60)
HGB BLD-MCNC: 12.4 G/DL (ref 12–16)
LDLC SERPL CALC-MCNC: 100 MG/DL
LYMPHOCYTES # BLD: 1 K/UL (ref 1–5.1)
LYMPHOCYTES NFR BLD: 13.7 %
MAGNESIUM SERPL-MCNC: 2.1 MG/DL (ref 1.8–2.4)
MCH RBC QN AUTO: 30.1 PG (ref 26–34)
MCHC RBC AUTO-ENTMCNC: 34.5 G/DL (ref 31–36)
MCV RBC AUTO: 87.2 FL (ref 80–100)
MONOCYTES # BLD: 0.3 K/UL (ref 0–1.3)
MONOCYTES NFR BLD: 4.6 %
NEUTROPHILS # BLD: 5.8 K/UL (ref 1.7–7.7)
NEUTROPHILS NFR BLD: 80.7 %
PLATELET # BLD AUTO: 420 K/UL (ref 135–450)
PLATELET BLD QL SMEAR: NORMAL
PMV BLD AUTO: 9 FL (ref 5–10.5)
POTASSIUM SERPL-SCNC: 4.3 MMOL/L (ref 3.5–5.1)
PROT SERPL-MCNC: 7.8 G/DL (ref 6.4–8.2)
RBC # BLD AUTO: 4.12 M/UL (ref 4–5.2)
SLIDE REVIEW: NORMAL
SODIUM SERPL-SCNC: 137 MMOL/L (ref 136–145)
TRIGL SERPL-MCNC: 50 MG/DL (ref 0–150)
VIT B12 SERPL-MCNC: 1075 PG/ML (ref 211–911)
VLDLC SERPL CALC-MCNC: 10 MG/DL
WBC # BLD AUTO: 7.2 K/UL (ref 4–11)

## 2023-12-14 ENCOUNTER — SCHEDULED TELEPHONE ENCOUNTER (OUTPATIENT)
Dept: INTERNAL MEDICINE CLINIC | Age: 28
End: 2023-12-14
Payer: COMMERCIAL

## 2023-12-14 ENCOUNTER — TELEPHONE (OUTPATIENT)
Dept: INTERNAL MEDICINE CLINIC | Age: 28
End: 2023-12-14

## 2023-12-14 DIAGNOSIS — K50.00 CROHN'S DISEASE OF SMALL INTESTINE WITHOUT COMPLICATION (HCC): Primary | ICD-10-CM

## 2023-12-14 DIAGNOSIS — R73.9 HYPERGLYCEMIA: ICD-10-CM

## 2023-12-14 PROCEDURE — 99441 PR PHYS/QHP TELEPHONE EVALUATION 5-10 MIN: CPT | Performed by: INTERNAL MEDICINE

## 2023-12-14 NOTE — PROGRESS NOTES
Sterling Dumont is a 33 y.o. female evaluated via telephone on 12/14/2023 for Results (Discuss lab results )  . Documentation:  I communicated with the patient and/or health care decision maker about lab results. Details of this discussion including any medical advice provided:   Patient has concerns about elevated glucose and albumin and sed rate. Also it was noted that her hemoglobin had declined by 1 point from before. The plan is to repeat labs and further evaluate for iron deficiency, given her history of Crohn's disease. Encouraged her to complete stool studies. Total Time: minutes: 5-10 minutes    Sterling Dumont was evaluated through a synchronous (real-time) audio encounter. Patient identification was verified at the start of the visit. She (or guardian if applicable) is aware that this is a billable service, which includes applicable co-pays. This visit was conducted with the patient's (and/or legal guardian's) verbal consent. She has not had a related appointment within my department in the past 7 days or scheduled within the next 24 hours. The patient was located at Home: [address] [place] [address]. The provider was located at [facility] (Appt Dept): [address] [facility],  [address]       Note: not billable if this call serves to triage the patient into an appointment for the relevant concern        Ceci Da Silva MD

## 2023-12-15 ENCOUNTER — OFFICE VISIT (OUTPATIENT)
Dept: ENT CLINIC | Age: 28
End: 2023-12-15
Payer: COMMERCIAL

## 2023-12-15 VITALS
HEIGHT: 60 IN | DIASTOLIC BLOOD PRESSURE: 77 MMHG | SYSTOLIC BLOOD PRESSURE: 115 MMHG | TEMPERATURE: 97.1 F | HEART RATE: 91 BPM | BODY MASS INDEX: 23.75 KG/M2 | WEIGHT: 121 LBS

## 2023-12-15 DIAGNOSIS — R04.0 EPISTAXIS: Primary | ICD-10-CM

## 2023-12-15 DIAGNOSIS — J06.9 VIRAL URI: ICD-10-CM

## 2023-12-15 DIAGNOSIS — J34.89 NASAL VESTIBULITIS: ICD-10-CM

## 2023-12-15 PROCEDURE — 99213 OFFICE O/P EST LOW 20 MIN: CPT | Performed by: OTOLARYNGOLOGY

## 2023-12-15 NOTE — PROGRESS NOTES
Pulmonary:      Effort: Pulmonary effort is normal. No respiratory distress. Breath sounds: No stridor. Musculoskeletal:      Cervical back: Neck supple. Lymphadenopathy:      Cervical: No cervical adenopathy. Skin:     General: Skin is warm and dry. Neurological:      Mental Status: She is alert and oriented to person, place, and time. Cranial Nerves: No cranial nerve deficit. Psychiatric:         Behavior: Behavior normal.           Procedure           Assessment and Plan     1. Epistaxis  Patient likely has nasal vestibulitis secondary to viral URI. I recommend a course of Bactroban ointment. Additionally recommend increasing hydration with either Pedialyte or liquid IV. Hold off on using Nasonex for a few days to let the nose heal.  If symptoms do not improve over the next 1 to 2 weeks she should call. There is no obvious source of concerning epistaxis on examination. 2. Viral URI      3. Nasal vestibulitis    - mupirocin (BACTROBAN) 2 % ointment; Apply pea sized amount to each nostril using a q-tip 3 times daily for 3 weeks. Dispense: 22 g; Refill: 0      Return if symptoms worsen or fail to improve. [ ] Review/order radiology tests   [ ] Independent interpretation of diagnostic test by another provider  [ ] Discussed case with another provider  [ ] High risk of loss of major body function  [ ] Elective major surgery with risk factors    Portions of this note were dictated using Dragon.  There may be linguistic errors secondary to the use of this program.

## 2023-12-28 ENCOUNTER — E-VISIT (OUTPATIENT)
Dept: OTHER | Facility: CLINIC | Age: 28
End: 2023-12-28

## 2023-12-28 ENCOUNTER — TELEPHONE (OUTPATIENT)
Dept: INTERNAL MEDICINE CLINIC | Age: 28
End: 2023-12-28

## 2023-12-28 DIAGNOSIS — U07.1 COVID-19: Primary | ICD-10-CM

## 2023-12-28 RX ORDER — BENZONATATE 200 MG/1
200 CAPSULE ORAL 3 TIMES DAILY PRN
Qty: 30 CAPSULE | Refills: 0 | Status: SHIPPED | OUTPATIENT
Start: 2023-12-28 | End: 2023-12-28 | Stop reason: SDUPTHER

## 2023-12-28 RX ORDER — BENZONATATE 200 MG/1
200 CAPSULE ORAL 3 TIMES DAILY PRN
Qty: 30 CAPSULE | Refills: 0 | Status: SHIPPED | OUTPATIENT
Start: 2023-12-28 | End: 2024-01-07

## 2023-12-28 ASSESSMENT — LIFESTYLE VARIABLES: SMOKING_STATUS: NO, I HAVE NEVER SMOKED

## 2023-12-28 NOTE — PROGRESS NOTES
Called patient and her cold symptoms started 12/24. She thought was improving but yesterday lost smell/taste and that is when she tested positive. Tylenol helps fever and body aches and headache. Has sore throat and on/off cough and congestion. --    On this date  I have spent 15 minutes reviewing previous notes, test results and face to face with the patient discussing the diagnosis and importance of compliance with the treatment plan as well as documenting on the day of the visit.

## 2023-12-28 NOTE — TELEPHONE ENCOUNTER
Patient attempted to do an e-visit but was kicked out. Patient started feeling ill on Christmas; cough, sore throat, congestion with colored mucus & headache. Felt like she had a fever but did not take temperature. Loss of taste & smell started yesterday. Tested positive for covid last night. Has been taking 2 extra strenth Tylenol every 6 hrs, since Christmas. Tried Dayquil yesterday which has 325 mg of acetaminophen & it did not help as much. Got flu & covid vaccine in October. Advised patient to isolate for 5 days from onset of symptoms & stay hydrated. Advised if she she has to go our after the 5 days, she should wear N-95. Any other advice on treating the symptoms?
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
## 2024-01-26 ENCOUNTER — PATIENT MESSAGE (OUTPATIENT)
Dept: INTERNAL MEDICINE CLINIC | Age: 29
End: 2024-01-26

## 2024-01-29 NOTE — TELEPHONE ENCOUNTER
From: Chela Mondragon  To: Dr. Amanda Mina  Sent: 1/26/2024 9:19 PM EST  Subject: Cloudy Urine     Hi Dr. Mina,     I’m currently being treated for a UTI and am almost finished with my antibiotic. I have been noticing that my urine has been cloudy lately which I asked my urogynecologist about but it’s something they don’t treat or monitor so I wanted to see if you had any thoughts on reasons this could be occurring or what I could do. I have been hydrating well.     Thank you,  Chela

## 2024-02-08 NOTE — TELEPHONE ENCOUNTER
LVM for patient to confirm 2pm today appointment.  The noon spot as promised has been taken by another patient but, 2:00 just opened up today.

## 2024-02-09 ENCOUNTER — OFFICE VISIT (OUTPATIENT)
Dept: INTERNAL MEDICINE CLINIC | Age: 29
End: 2024-02-09
Payer: COMMERCIAL

## 2024-02-09 DIAGNOSIS — F33.1 MODERATE EPISODE OF RECURRENT MAJOR DEPRESSIVE DISORDER (HCC): ICD-10-CM

## 2024-02-09 DIAGNOSIS — R39.9 UTI SYMPTOMS: Primary | ICD-10-CM

## 2024-02-09 DIAGNOSIS — K50.00 CROHN'S DISEASE OF SMALL INTESTINE WITHOUT COMPLICATION (HCC): ICD-10-CM

## 2024-02-09 DIAGNOSIS — R42 DIZZINESS: ICD-10-CM

## 2024-02-09 DIAGNOSIS — R73.9 HYPERGLYCEMIA: ICD-10-CM

## 2024-02-09 PROCEDURE — 99214 OFFICE O/P EST MOD 30 MIN: CPT | Performed by: INTERNAL MEDICINE

## 2024-02-09 NOTE — PATIENT INSTRUCTIONS
[facility]    Dr Hairston ID     --    Labs and UA    CT Urogram    Stay well hydrated    Otc probiotics - align or florastor    Consider MRA if vertigo persists

## 2024-02-09 NOTE — PROGRESS NOTES
Chela Mondragon (:  1995) is a 28 y.o. female, here for evaluation of the following chief complaint(s):    Dizziness      ASSESSMENT/PLAN:  1. UTI symptoms  Patient with recurrent UTI symptoms but does not think she will be able to urinate here in the office so she will return with a sample next week.  She already has a CT urogram ordered from her urogynecologist to evaluate for kidney stones.  Advised continued good hydration, cranberry pills and d-mannose for prophylaxis.  Also advised starting probiotics.  We discussed that she may want to see an infectious disease doctor due to the recurrent antibiotics causing drug resistance.  We also discussed possibly seeing a urologist to further evaluate  -     Culture, Urine  -     Urinalysis  2. Dizziness  Generalized lightheadedness seems to relate to UTI symptoms.  Will check related labs.  Also describes symptoms more consistent with vertigo.  Reviewed workup to date.  Might consider MRA Head and Neck in the future  3. Moderate episode of recurrent major depressive disorder (HCC)  Stable on sertraline   4. Crohn's disease of small intestine without complication (HCC)  Generally well-controlled on Metamucil    Return if symptoms worsen or fail to improve.    SUBJECTIVE/OBJECTIVE:  HPI  Patient is here regarding her recurrent UTI symptoms.  She has seen a urogynecologist to has stated that she may have interstitial cystitis, pelvic floor dysfunction, and recurrent UTI.  They have tried to differentiate these by straight cathing the patient prior to urine samples.  She has been on recurrent antibiotics and based on review of recent urine cultures has developed drug resistance.    She states a new symptom is cloudy urine.  She states that she feels lightheaded/dizzy when she has her urinary tract infections.  She has not taken Cipro which can cause dizziness.  Her recent antibiotics have included Bactrim and fosfomycin.    Patient takes d-mannose and cranberry

## 2024-02-10 LAB
ALBUMIN SERPL-MCNC: 4.8 G/DL (ref 3.4–5)
EST. AVERAGE GLUCOSE BLD GHB EST-MCNC: 108.3 MG/DL
FERRITIN SERPL IA-MCNC: 10.4 NG/ML (ref 15–150)
HBA1C MFR BLD: 5.4 %
IRON SATN MFR SERPL: 22 % (ref 15–50)
IRON SERPL-MCNC: 96 UG/DL (ref 37–145)
TIBC SERPL-MCNC: 435 UG/DL (ref 260–445)

## 2024-02-11 VITALS
BODY MASS INDEX: 23.75 KG/M2 | TEMPERATURE: 98.3 F | WEIGHT: 121 LBS | DIASTOLIC BLOOD PRESSURE: 68 MMHG | HEIGHT: 60 IN | OXYGEN SATURATION: 99 % | HEART RATE: 79 BPM | SYSTOLIC BLOOD PRESSURE: 104 MMHG

## 2024-02-12 RX ORDER — FERROUS SULFATE 325(65) MG
325 TABLET ORAL
Qty: 30 TABLET | Refills: 5 | Status: SHIPPED | OUTPATIENT
Start: 2024-02-12

## 2024-03-08 ENCOUNTER — TELEPHONE (OUTPATIENT)
Dept: INTERNAL MEDICINE CLINIC | Age: 29
End: 2024-03-08

## 2024-03-08 DIAGNOSIS — N39.0 RECURRENT UTI: Primary | ICD-10-CM

## 2024-03-08 NOTE — TELEPHONE ENCOUNTER
Patient is calling in for  in regards to calling Dr.Richard Hairston for Infectious disease and trying to set up an appointment per  recommendation. Per patient they are needing a referral to be sent to their office before being able to schedule the patient.     Please send referral and call patient once ready to:    Dr.Richard Hairston  Infectious disease    [address], [place] [address]   P# [phone]   F# [phone]

## 2024-03-13 ENCOUNTER — TELEPHONE (OUTPATIENT)
Dept: INFECTIOUS DISEASES | Age: 29
End: 2024-03-13

## 2024-03-13 NOTE — TELEPHONE ENCOUNTER
Left  for pt stating we received a referral from Dr Mina.  Our [place] office has openings on Monday, March 19th, with Dr Shell who is a female.  We also have Dr Hairston, who is male, at this time I do not have an opening for him next week.  Please contact our office to schedule with either of these doctors  Left our office number [phone]

## 2024-03-13 NOTE — TELEPHONE ENCOUNTER
Spoke with pt  She is going to look at her work schedule and call me back.  She is looking to schedule with Dr Hairston  He has opened up Next Tuesday 3/19 I gave her 11am, 1140am and 1220 as appt times

## 2024-03-15 NOTE — TELEPHONE ENCOUNTER
Left vm asking pt to return call to office to schedule a new patient appt either Tuesday 3/19 or Thursday 3/21  (Times available Tuesday for new patient - 11, 1140, 1220.  Time available Thursday 1400 or 1420)  Dr Hairston's name, specialty and affiliation  Pt name   Office [phone]

## 2024-03-18 NOTE — TELEPHONE ENCOUNTER
WARREN Garcia's office is calling in for  in regards to not being able to schedule the patient for a new patient and wanted to inform .

## 2024-03-18 NOTE — TELEPHONE ENCOUNTER
3rd attempt to contact pt to schedule new pt appt  Availability is Thursday at 1420 (2:40pm) in the [place] office  Will contact referring MD to notify tim of inability to get pt scheduled for appt

## 2024-03-22 ENCOUNTER — PATIENT MESSAGE (OUTPATIENT)
Dept: INTERNAL MEDICINE CLINIC | Age: 29
End: 2024-03-22

## 2024-04-04 ENCOUNTER — SCHEDULED TELEPHONE ENCOUNTER (OUTPATIENT)
Dept: INTERNAL MEDICINE CLINIC | Age: 29
End: 2024-04-04
Payer: COMMERCIAL

## 2024-04-04 DIAGNOSIS — K50.00 CROHN'S DISEASE OF SMALL INTESTINE WITHOUT COMPLICATION (HCC): ICD-10-CM

## 2024-04-04 DIAGNOSIS — R42 DIZZINESS: Primary | ICD-10-CM

## 2024-04-04 PROCEDURE — 99442 PR PHYS/QHP TELEPHONE EVALUATION 11-20 MIN: CPT | Performed by: INTERNAL MEDICINE

## 2024-04-04 NOTE — PROGRESS NOTES
Chela Mondragon is a 28 y.o. female evaluated via telephone on 4/4/2024 for No chief complaint on file.  .        Documentation:  I communicated with the patient and/or health care decision maker about Crohn's related symptoms, and dizziness/migraines.   Details of this discussion including any medical advice provided:   Discussed work accommodations she requests.  FMLA form completed.    She states that she has a colonoscopy pending in 2 weeks.  She states taking extra iron did not help her dizziness.    Total Time: minutes: 11-20 minutes    Chela Mondragon was evaluated through a synchronous (real-time) audio encounter. Patient identification was verified at the start of the visit. She (or guardian if applicable) is aware that this is a billable service, which includes applicable co-pays. This visit was conducted with the patient's (and/or legal guardian's) verbal consent. She has not had a related appointment within my department in the past 7 days or scheduled within the next 24 hours.   The patient was located at Home: [address] [place] [address].  The provider was located at Facility (Appt Dept): [address] [place] [address].    Note: not billable if this call serves to triage the patient into an appointment for the relevant concern  Yes, I confirm.     ELMO BROWN MD

## 2024-04-05 ENCOUNTER — TELEPHONE (OUTPATIENT)
Dept: INTERNAL MEDICINE CLINIC | Age: 29
End: 2024-04-05

## 2024-04-05 DIAGNOSIS — F33.1 MODERATE EPISODE OF RECURRENT MAJOR DEPRESSIVE DISORDER (HCC): ICD-10-CM

## 2024-04-05 NOTE — TELEPHONE ENCOUNTER
----- Message from Amanda Mina MD sent at 4/4/2024  5:29 PM EDT -----  Fax fmla form to her  department - [phone]

## 2024-04-05 NOTE — TELEPHONE ENCOUNTER
Patient is calling in for /Rubin due to her running out of medication and is needing a refill for :      sertraline (ZOLOFT) 50 MG tablet   Last appointment: 11/16/2023  Next appointment: Visit date not found  Last refill: 10/03/22      Please advise if  would be able to fill for patient due to no longer seeing

## 2024-04-18 ENCOUNTER — OFFICE VISIT (OUTPATIENT)
Dept: INFECTIOUS DISEASES | Age: 29
End: 2024-04-18
Payer: COMMERCIAL

## 2024-04-18 VITALS
HEIGHT: 60 IN | SYSTOLIC BLOOD PRESSURE: 125 MMHG | TEMPERATURE: 97.3 F | HEART RATE: 111 BPM | DIASTOLIC BLOOD PRESSURE: 77 MMHG | BODY MASS INDEX: 24.85 KG/M2 | OXYGEN SATURATION: 98 % | WEIGHT: 126.6 LBS

## 2024-04-18 DIAGNOSIS — Z16.24 NEWLY DIAGNOSED INFECTION DUE TO MULTIDRUG RESISTANT ORGANISM: ICD-10-CM

## 2024-04-18 DIAGNOSIS — N39.0 RECURRENT UTI: Primary | ICD-10-CM

## 2024-04-18 PROCEDURE — 99205 OFFICE O/P NEW HI 60 MIN: CPT | Performed by: INTERNAL MEDICINE

## 2024-04-18 NOTE — PROGRESS NOTES
EXAM:    Vitals:  See intake vitals including weight    GENERAL: No apparent distress.    HEENT: Membranes moist, no oral lesion  NECK:  Supple  LYMPH: No adenopathy   LUNGS: Clear b/l, no rales, no dullness  CARDIAC: RRR, no murmur appreciated  ABD:  + BS, soft / NT  EXT:  No rash, no edema, no lesions  NEURO: No focal neurologic findings  PSYCH: Orientation, sensorium, mood normal  Wound:      DATA:    See EPIC    2/2/24 Urine cult - MDR E coli  Lab Results - (ABNORMAL) CULTURE URINE (02/02/2024 1:07 PM EST)  Organism Antibiotic Method Susceptibility   Escherichia coli Ampicillin CARL >16: Resistant      Comment: RESISTANT   Escherichia coli Ampicillin + Sulbactam CARL 8/4: Sensitive      Comment: SUSCEPTIBLE   Escherichia coli Aztreonam CARL >16: Resistant      Comment: RESISTANT   Escherichia coli Cefepime CARL >16: Resistant      Comment: RESISTANT   Escherichia coli Cefotaxime CARL >32: Resistant      Comment: RESISTANT   Escherichia coli Ceftazidime CARL >16: Resistant      Comment: RESISTANT   Escherichia coli Ceftriaxone CARL >32: Resistant      Comment: RESISTANT   Escherichia coli Cefuroxime CARL >16: Resistant      Comment: RESISTANT   Escherichia coli Ciprofloxacin CARL >2: Resistant      Comment: RESISTANT   Escherichia coli Ertapenem CARL <=0.5: Sensitive      Comment: SUSCEPTIBLE   Escherichia coli Gentamicin CARL <=2: Sensitive      Comment: SUSCEPTIBLE   Escherichia coli Imipenem CARL <=1: Sensitive      Comment: SUSCEPTIBLE   Escherichia coli Levofloxacin CARL >4: Resistant      Comment: RESISTANT   Escherichia coli Meropenem CARL <=1: Sensitive      Comment: SUSCEPTIBLE   Escherichia coli Nitrofurantoin CARL <=32: Sensitive      Comment: SUSCEPTIBLE   Escherichia coli PIP Tazo CARL <=8: Sensitive      Comment: SUSCEPTIBLE   Escherichia coli Tetracycline CARL <=4: Sensitive      Comment: SUSCEPTIBLE   Escherichia coli Tobramycin CARL <=2: Sensitive      Comment: SUSCEPTIBLE   Escherichia coli Cefazolin CARL >16:

## 2024-04-19 DIAGNOSIS — R42 DIZZINESS: Primary | ICD-10-CM

## 2024-04-23 DIAGNOSIS — R42 DIZZINESS: Primary | ICD-10-CM

## 2024-05-07 ENCOUNTER — PATIENT MESSAGE (OUTPATIENT)
Dept: INTERNAL MEDICINE CLINIC | Age: 29
End: 2024-05-07

## 2024-05-07 NOTE — TELEPHONE ENCOUNTER
From: Chela Mondragon  To: Dr. Amanda Mina  Sent: 5/7/2024 12:27 AM EDT  Subject: Lumbar Spine MRI    Hi, Dr. Mina    The test results for my lumbar spine MRI came back; does everything look alright to you?

## 2024-05-08 NOTE — TELEPHONE ENCOUNTER
Patient is calling in for  in regards to getting the MRI result's.     Please call patient to discuss or advise if appt needs to be scheduled.

## 2024-05-10 ENCOUNTER — SCHEDULED TELEPHONE ENCOUNTER (OUTPATIENT)
Dept: INTERNAL MEDICINE CLINIC | Age: 29
End: 2024-05-10
Payer: COMMERCIAL

## 2024-05-10 DIAGNOSIS — Q76.0 SPINA BIFIDA OCCULTA: Primary | ICD-10-CM

## 2024-05-10 PROCEDURE — 99442 PR PHYS/QHP TELEPHONE EVALUATION 11-20 MIN: CPT | Performed by: INTERNAL MEDICINE

## 2024-05-10 ASSESSMENT — PATIENT HEALTH QUESTIONNAIRE - PHQ9
1. LITTLE INTEREST OR PLEASURE IN DOING THINGS: SEVERAL DAYS
4. FEELING TIRED OR HAVING LITTLE ENERGY: SEVERAL DAYS
3. TROUBLE FALLING OR STAYING ASLEEP: SEVERAL DAYS
8. MOVING OR SPEAKING SO SLOWLY THAT OTHER PEOPLE COULD HAVE NOTICED. OR THE OPPOSITE, BEING SO FIGETY OR RESTLESS THAT YOU HAVE BEEN MOVING AROUND A LOT MORE THAN USUAL: NOT AT ALL
10. IF YOU CHECKED OFF ANY PROBLEMS, HOW DIFFICULT HAVE THESE PROBLEMS MADE IT FOR YOU TO DO YOUR WORK, TAKE CARE OF THINGS AT HOME, OR GET ALONG WITH OTHER PEOPLE: SOMEWHAT DIFFICULT
SUM OF ALL RESPONSES TO PHQ9 QUESTIONS 1 & 2: 2
SUM OF ALL RESPONSES TO PHQ QUESTIONS 1-9: 7
5. POOR APPETITE OR OVEREATING: MORE THAN HALF THE DAYS
9. THOUGHTS THAT YOU WOULD BE BETTER OFF DEAD, OR OF HURTING YOURSELF: NOT AT ALL
2. FEELING DOWN, DEPRESSED OR HOPELESS: SEVERAL DAYS
SUM OF ALL RESPONSES TO PHQ QUESTIONS 1-9: 7
6. FEELING BAD ABOUT YOURSELF - OR THAT YOU ARE A FAILURE OR HAVE LET YOURSELF OR YOUR FAMILY DOWN: SEVERAL DAYS
7. TROUBLE CONCENTRATING ON THINGS, SUCH AS READING THE NEWSPAPER OR WATCHING TELEVISION: NOT AT ALL

## 2024-05-10 NOTE — PROGRESS NOTES
Chela Mondragon is a  female evaluated via telephone on 5/10/2024 for Results  .      Documentation:  I communicated with the patient and/or health care decision maker about lumbar MRI.     1.  Spina bifida occulta from L5 through upper sacrum with low-lying conus terminating at L3-L4 disc space with tiny fatty filum.  No definite sinus tract extending to skin surface.  2.  No degenerative disc disease, disc herniation, or canal/neural foraminal narrowing at any level  Exam End: 05/03/24 18:24       Details of this discussion including any medical advice provided:   Advised patient to discuss with your gynecologist whether there were any clinical findings with urodynamic studies of urinary retention as cause of her recurrent UTIs that could be attributed to spina bifida occulta.  If yes, then would agree she should see the recommended neurosurgeon at [facility] to determine if the benefits of reparative procedures exceeded any risks.    From uptodate:  \"Surgeries to correct various comorbid conditions associated with CSD, such as urinary retention, incontinence, impotence, constipation, anorectal malformations, tumors, and dorsal cutaneous lesions. These interventions involve the fields of urologic, gastroenterologic, colorectal, oncologic, plastic, and general surgery. Urologic surgery for urinary incontinence includes implantation of an artificial urinary sphincter [59], and bladder augmentation surgery [60]. \"  Total Time: minutes: 11-20 minutes    Chela Mondragon was evaluated through a synchronous (real-time) audio encounter. Patient identification was verified at the start of the visit. She (or guardian if applicable) is aware that this is a billable service, which includes applicable co-pays. This visit was conducted with the patient's (and/or legal guardian's) verbal consent. She has not had a related appointment within my department in the past 7 days or scheduled within the next 24 hours.   The patient was

## 2024-08-23 ENCOUNTER — TELEPHONE (OUTPATIENT)
Dept: INTERNAL MEDICINE CLINIC | Age: 29
End: 2024-08-23

## 2024-08-23 NOTE — TELEPHONE ENCOUNTER
Patient has been instructed to call the office to schedule to be evaluated.     Per Dr Mina patient may be scheduled with alternate provider if she would like to be seen today.             Hi Dr. Mina,      I’ve been feeling unwell for almost 3 weeks now (feeling feverish, body aches, weakness, headaches, and sinus issues) In the beginning I took a few covid tests which were all negative, thought it was a common cold so I used Tylenol for 2-3 days which slightly helped but the symptoms still remained so I went to a clinic. They thought it was a UTI but my urine came back clear and they thought it could be a sinus infection so they prescribed Cephalexin which I finished today. My symptoms still remain; do you have any ideas on what I should do or what it could be?

## 2024-08-30 ENCOUNTER — OFFICE VISIT (OUTPATIENT)
Dept: INTERNAL MEDICINE CLINIC | Age: 29
End: 2024-08-30
Payer: COMMERCIAL

## 2024-08-30 VITALS
WEIGHT: 129.8 LBS | OXYGEN SATURATION: 99 % | SYSTOLIC BLOOD PRESSURE: 122 MMHG | HEART RATE: 93 BPM | DIASTOLIC BLOOD PRESSURE: 80 MMHG | BODY MASS INDEX: 25.35 KG/M2 | TEMPERATURE: 98.1 F

## 2024-08-30 DIAGNOSIS — B34.9 VIRAL ILLNESS: ICD-10-CM

## 2024-08-30 DIAGNOSIS — B34.9 VIRAL ILLNESS: Primary | ICD-10-CM

## 2024-08-30 LAB
BASOPHILS # BLD: 0.1 K/UL (ref 0–0.2)
BASOPHILS NFR BLD: 1 %
DEPRECATED RDW RBC AUTO: 12.9 % (ref 12.4–15.4)
EOSINOPHIL # BLD: 0.3 K/UL (ref 0–0.6)
EOSINOPHIL NFR BLD: 3.7 %
HCT VFR BLD AUTO: 40.4 % (ref 36–48)
HGB BLD-MCNC: 13.6 G/DL (ref 12–16)
LYMPHOCYTES # BLD: 2.5 K/UL (ref 1–5.1)
LYMPHOCYTES NFR BLD: 33.4 %
MCH RBC QN AUTO: 31.4 PG (ref 26–34)
MCHC RBC AUTO-ENTMCNC: 33.7 G/DL (ref 31–36)
MCV RBC AUTO: 93.3 FL (ref 80–100)
MONOCYTES # BLD: 0.6 K/UL (ref 0–1.3)
MONOCYTES NFR BLD: 8.2 %
NEUTROPHILS # BLD: 4.1 K/UL (ref 1.7–7.7)
NEUTROPHILS NFR BLD: 53.7 %
PLATELET # BLD AUTO: 365 K/UL (ref 135–450)
PMV BLD AUTO: 8.9 FL (ref 5–10.5)
RBC # BLD AUTO: 4.33 M/UL (ref 4–5.2)
WBC # BLD AUTO: 7.6 K/UL (ref 4–11)

## 2024-08-30 PROCEDURE — 99213 OFFICE O/P EST LOW 20 MIN: CPT | Performed by: INTERNAL MEDICINE

## 2024-08-30 NOTE — PROGRESS NOTES
Chela Mondragon (:  1995) is a 29 y.o. female, here for evaluation of the following chief complaint(s):    Cough (3 weeks now (feeling feverish, body aches, weakness, headaches, and sinus issues)      ASSESSMENT/PLAN:  1. Viral illness  Differential diagnosis also includes allergic rhinitis.  Advised a trial of Allegra-D for several days and call early next week with results.  -     Comprehensive Metabolic Panel; Future  -     CBC with Auto Differential; Future  -     Sedimentation Rate; Future  -     C-Reactive Protein; Future      Return if symptoms worsen or fail to improve.    SUBJECTIVE/OBJECTIVE:  HPI  Patient has been feeling unwell for about 3 weeks including fevers, myalgias, weakness, headaches, sinus congestion.  She states she had negative COVID test.  She states Tylenol helped slightly.  Due to lack of improvement, she went to an urgent care clinic and got Keflex.  But still with headaches, fatigue, chills but not always, nasal and chest congestion.  Patient denies rash, recent travel   except that she did hike this past weekend.  She has not been feeling too unwell to work.  She thinks she may be getting better but is so exhausted.    Also notes scattered moles on her neck and arms recently.     Review of Systems    Past Medical History:   Diagnosis Date    Abdominal bloating     h pylori pos    Anxiety 2018    B12 deficiency     Chronic sinusitis     path report - dense \"eosinophilic infiltrate\"    COVID-19 2020    Crohn's disease of small intestine (HCC) 2021    Dizziness     GERD (gastroesophageal reflux disease)     Interstitial cystitis     Moderate episode of recurrent major depressive disorder (HCC) 10/03/2022    Non-allergic rhinitis     saw allergist dr wilson, negative for trees, grasses, molds    Nosebleed     Overactive bladder     Tachycardia     ectopic atrial rhythm, inappropriate sinus tachycardia    Tinnitus     Urge incontinence     interstial

## 2024-08-30 NOTE — PATIENT INSTRUCTIONS
Try Allegra D for several days and call Tuesday with results    Labs    [facility]  [phone]  Dr Brownlee    [facility]    [facility]  Tereza Rabago

## 2024-08-31 LAB
ALBUMIN SERPL-MCNC: 4.6 G/DL (ref 3.4–5)
ALBUMIN/GLOB SERPL: 1.8 {RATIO} (ref 1.1–2.2)
ALP SERPL-CCNC: 97 U/L (ref 40–129)
ALT SERPL-CCNC: 25 U/L (ref 10–40)
ANION GAP SERPL CALCULATED.3IONS-SCNC: 11 MMOL/L (ref 3–16)
AST SERPL-CCNC: 21 U/L (ref 15–37)
BILIRUB SERPL-MCNC: 0.5 MG/DL (ref 0–1)
BUN SERPL-MCNC: 11 MG/DL (ref 7–20)
CALCIUM SERPL-MCNC: 9.8 MG/DL (ref 8.3–10.6)
CHLORIDE SERPL-SCNC: 104 MMOL/L (ref 99–110)
CO2 SERPL-SCNC: 26 MMOL/L (ref 21–32)
CREAT SERPL-MCNC: 0.7 MG/DL (ref 0.6–1.1)
CRP SERPL-MCNC: <3 MG/L (ref 0–5.1)
ERYTHROCYTE [SEDIMENTATION RATE] IN BLOOD BY WESTERGREN METHOD: 7 MM/HR (ref 0–20)
GFR SERPLBLD CREATININE-BSD FMLA CKD-EPI: >90 ML/MIN/{1.73_M2}
GLUCOSE SERPL-MCNC: 81 MG/DL (ref 70–99)
POTASSIUM SERPL-SCNC: 4.5 MMOL/L (ref 3.5–5.1)
PROT SERPL-MCNC: 7.1 G/DL (ref 6.4–8.2)
SODIUM SERPL-SCNC: 141 MMOL/L (ref 136–145)

## 2024-10-30 ENCOUNTER — APPOINTMENT (OUTPATIENT)
Dept: GENERAL RADIOLOGY | Age: 29
End: 2024-10-30
Payer: OTHER MISCELLANEOUS

## 2024-10-30 ENCOUNTER — HOSPITAL ENCOUNTER (EMERGENCY)
Age: 29
Discharge: HOME | End: 2024-10-30
Payer: OTHER MISCELLANEOUS

## 2024-10-30 VITALS
RESPIRATION RATE: 16 BRPM | HEIGHT: 60 IN | TEMPERATURE: 97.8 F | BODY MASS INDEX: 25.4 KG/M2 | SYSTOLIC BLOOD PRESSURE: 141 MMHG | OXYGEN SATURATION: 100 % | DIASTOLIC BLOOD PRESSURE: 94 MMHG | WEIGHT: 129.4 LBS | HEART RATE: 78 BPM

## 2024-10-30 DIAGNOSIS — R07.89 OTHER CHEST PAIN: ICD-10-CM

## 2024-10-30 DIAGNOSIS — R42 LIGHTHEADED: ICD-10-CM

## 2024-10-30 DIAGNOSIS — S46.812A STRAIN OF LEFT TRAPEZIUS MUSCLE, INITIAL ENCOUNTER: ICD-10-CM
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
## 2024-10-30 PROCEDURE — 71045 X-RAY EXAM CHEST 1 VIEW: CPT

## 2024-10-30 PROCEDURE — 99283 EMERGENCY DEPT VISIT LOW MDM: CPT

## 2024-10-30 ASSESSMENT — ENCOUNTER SYMPTOMS
SHORTNESS OF BREATH: 0
VOMITING: 0
ABDOMINAL PAIN: 0
FACIAL SWELLING: 0
RESPIRATORY NEGATIVE: 1
SORE THROAT: 0
DIARRHEA: 0
EYES NEGATIVE: 1
GASTROINTESTINAL NEGATIVE: 1
BACK PAIN: 0
NAUSEA: 0
WHEEZING: 0
PHOTOPHOBIA: 0

## 2024-10-30 ASSESSMENT — PAIN DESCRIPTION - FREQUENCY: FREQUENCY: CONTINUOUS

## 2024-10-30 ASSESSMENT — PAIN DESCRIPTION - LOCATION: LOCATION: CHEST

## 2024-10-30 ASSESSMENT — PAIN SCALES - GENERAL: PAINLEVEL_OUTOF10: 4

## 2024-10-30 ASSESSMENT — PAIN DESCRIPTION - DESCRIPTORS: DESCRIPTORS: SORE

## 2024-10-30 ASSESSMENT — PAIN DESCRIPTION - ONSET: ONSET: SUDDEN

## 2024-10-30 ASSESSMENT — PAIN DESCRIPTION - PAIN TYPE: TYPE: ACUTE PAIN

## 2024-10-30 ASSESSMENT — PAIN - FUNCTIONAL ASSESSMENT: PAIN_FUNCTIONAL_ASSESSMENT: 0-10
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
## 2024-10-30 NOTE — DISCHARGE INSTRUCTIONS
Alternate tylenol and ibuprofen for pain.  IF vision changes, vomiting, confusion, worsening headaches, or symptoms return to ED

## 2024-11-19 ENCOUNTER — TELEPHONE (OUTPATIENT)
Dept: INTERNAL MEDICINE CLINIC | Age: 29
End: 2024-11-19

## 2024-12-03 NOTE — PROGRESS NOTES
Phone call to patient. Her  answered the phone. Discussed our [facility]. He was very upset and asked if he can be put on a cancellation list. Talked with Jason at the . Dr. Shaffer has a slot open tomorrow and patient will be scheduled. They have completed prior testing at [facility].  Ladan Wick 12/03/24 1:48 PM    murmur, no rub  ABDOMEN: Soft, non-tender, non-distended. No masses  EXTREMETIES: Normal movement of all extremities. No edema. No joint swelling. SKIN:  No rash, no cellulitis, no bruising, no petechiae/purpura/vesicles/pustules/abscess  PSYCH:  A+O x 3; normal affect  NEURO:  GCS 15, CN2-12 grossly intact, no focal motor/sensory deficits, no cerebellar deficits, normal gait, normal speech      Assessment/Plan     78-year-old female here for annual exam.  She is up-to-date with her health screening except for fluid T dap. She is also due for routine blood tests. We'll check lipids, glucose, TSH. Patient also requests hormonal blood work due to her irregular periods. We'll check LH, FSH, progesterone, testosterone. Her irregular periods are likely due to loss of regulatory control. This was the assessment of her gynecologist in recent months. Recommend continuing oral contraceptives for at least 6 months, and then evaluating at that point. Discussed with patient medication/s dosage, instructions, potential S/E, A/R and Drug Interaction  Educational material provided regarding patient's condition  Tylenol or Motrin as needed for pain or fever  Advise to return here if worse or go to nearest ER  Encourage fluids  Pt discharged in stable condition at 14:20      1. Annual physical exam    - Lipid, Fasting; Future  - Glucose, Fasting; Future  - CBC Auto Differential; Future  - TSH with Reflex; Future    2. Irregular menstruation    - Testosterone, free, total; Future  - LUTEINIZING HORMONE; Future  - FOLLICLE STIMULATING HORMONE; Future  - PROGESTERONE; Future    3. Need for influenza vaccination    - Tdap (age 10y-63y) IM (ADACEL)    4.  Need for Tdap vaccination    - INFLUENZA, QUADV, 18-64 YRS, ID, PF, MICRO INJ, 0.1ML (FLUZONE QUADV, PF)      Orders Placed This Encounter   Procedures    Tdap (age 10y-63y) IM (ADACEL)    INFLUENZA, QUADV, 18-64 YRS, ID, PF, MICRO INJ, 0.1ML (FLUZONE QUADV, PF)    Lipid, Fasting     Standing Status:   Future     Standing Expiration Date:   12/4/2018    Glucose, Fasting     Standing Status:   Future     Standing Expiration Date:   12/4/2018    CBC Auto Differential     Standing Status:   Future     Standing Expiration Date:   12/4/2018    TSH with Reflex     Standing Status:   Future     Standing Expiration Date:   12/4/2018    Testosterone, free, total     Standing Status:   Future     Standing Expiration Date:   12/4/2018    LUTEINIZING HORMONE     Standing Status:   Future     Standing Expiration Date:   93/5/8706    FOLLICLE STIMULATING HORMONE     Standing Status:   Future     Standing Expiration Date:   12/4/2018    PROGESTERONE     Standing Status:   Future     Standing Expiration Date:   12/4/2018       No Follow-up on file.     Travis Sheehan MD    12/4/2017  2:19 PM

## 2024-12-17 NOTE — PROGRESS NOTES
Chela Mondragon is a 29 y.o. female evaluated via telephone on 12/18/2024 for FMLA re: depression  .   Documentation:  I communicated with the patient and/or health care decision maker about depression.   Details of this discussion including any medical advice provided:   --  Patient catches me up on what has been going on.  She is currently taking Zoloft 25 mg daily for depression.  She had been advised to switch to Pristiq and to start Deplin but has not done so yet.  She went to [facility] for partial hospitalization in 11/24 and this date in the program for 2 weeks.  She then returned to work full-time but found it very difficult so she returned to a part-time schedule of 20 hours/week which she would like to keep until mid to end January.  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  She was supposed to follow-up with me in the office but has not yet done so.  She is seeing a therapist.  She has not yet established with a new psychiatrist.  I encouraged her to do so.  Told her to follow-up with me in 4 weeks.  Asked her to send me results of [employer] testing.    Total Time: minutes: 21-30 minutes    Chela Mondragon was evaluated through a synchronous (real-time) audio encounter. Patient identification was verified at the start of the visit. She (or guardian if applicable) is aware that this is a billable service, which includes applicable co-pays. This visit was conducted with the patient's (and/or legal guardian's) verbal consent. She has not had a related appointment within my department in the past 7 days or scheduled within the next 24 hours.   The patient was located at Home: [address] [place] [address].  The provider was located at Facility (Appt Dept): [address] [place] [address].  Confirm you are appropriately licensed, registered, or certified to deliver care in the state where the patient is located as indicated above. If you are

## 2024-12-18 ENCOUNTER — TELEPHONE (OUTPATIENT)
Dept: INTERNAL MEDICINE CLINIC | Age: 29
End: 2024-12-18

## 2024-12-18 ENCOUNTER — SCHEDULED TELEPHONE ENCOUNTER (OUTPATIENT)
Dept: INTERNAL MEDICINE CLINIC | Age: 29
End: 2024-12-18
Payer: COMMERCIAL

## 2024-12-18 DIAGNOSIS — F33.1 MODERATE EPISODE OF RECURRENT MAJOR DEPRESSIVE DISORDER (HCC): Primary | ICD-10-CM

## 2024-12-18 DIAGNOSIS — E53.8 FOLIC ACID DEFICIENCY: ICD-10-CM

## 2024-12-18 PROCEDURE — 99443 PR PHYS/QHP TELEPHONE EVALUATION 21-30 MIN: CPT | Performed by: INTERNAL MEDICINE

## 2024-12-18 NOTE — TELEPHONE ENCOUNTER
----- Message from Dr. Amanda Mina MD sent at 12/18/2024 12:37 PM EST -----  Get genesight test from [facility]

## 2024-12-18 NOTE — PATIENT INSTRUCTIONS
Get established with psychiatry- send me list from  your insurance company or see me soon for an appointment  --  Send Nanorex test

## 2024-12-25 ENCOUNTER — TELEPHONE (OUTPATIENT)
Dept: INTERNAL MEDICINE CLINIC | Age: 29
End: 2024-12-25

## 2024-12-26 NOTE — TELEPHONE ENCOUNTER
Let patient know I got a copy of the GeneSight test results from [facility] and it will be scanned into the chart.  If she wants to further discuss with me, please make an appointment.

## 2024-12-30 ENCOUNTER — PATIENT MESSAGE (OUTPATIENT)
Dept: INTERNAL MEDICINE CLINIC | Age: 29
End: 2024-12-30

## 2024-12-30 ENCOUNTER — TELEPHONE (OUTPATIENT)
Dept: INTERNAL MEDICINE CLINIC | Age: 29
End: 2024-12-30

## 2024-12-30 ENCOUNTER — TELEMEDICINE (OUTPATIENT)
Dept: INTERNAL MEDICINE CLINIC | Age: 29
End: 2024-12-30
Payer: COMMERCIAL

## 2024-12-30 DIAGNOSIS — A08.4 VIRAL GASTROENTERITIS: Primary | ICD-10-CM

## 2024-12-30 PROCEDURE — 99442 PR PHYS/QHP TELEPHONE EVALUATION 11-20 MIN: CPT | Performed by: INTERNAL MEDICINE

## 2024-12-30 RX ORDER — PROMETHAZINE HYDROCHLORIDE 25 MG/1
25 TABLET ORAL 4 TIMES DAILY PRN
Qty: 20 TABLET | Refills: 0 | Status: SHIPPED | OUTPATIENT
Start: 2024-12-30 | End: 2025-01-06

## 2024-12-30 NOTE — TELEPHONE ENCOUNTER
----- Message from Dr. Amanda Mina MD sent at 12/18/2024 12:46 PM EST -----  Call pt for appt in 4 weeks   [JVD] : no jugular venous distention  [Normal Breath Sounds] : Normal breath sounds [2+] : left 2+ [Ankle Swelling (On Exam)] : not present [Varicose Veins Of Lower Extremities] : not present [] : not present [No Rash or Lesion] : No rash or lesion [Skin Ulcer] : no ulcer [Alert] : alert [Calm] : calm [de-identified] : appears well

## 2024-12-31 NOTE — PROGRESS NOTES
Chela Mondragon is a 29 y.o. female evaluated via telephone on 12/30/2024 for stomach bug.  .    Documentation:  I communicated with the patient and/or health care decision maker about stomach bug.   Details of this discussion including any medical advice provided:   \"I may have gotten a stomach bug that is going around. My symptoms are vomiting, diarrhea, fever, chills, and body aches which I wasn’t sure is a part of getting a stomach bug.\"   Her symptoms started last Friday. They included fever and body aches and vomiting and diarrhea, feverish. Had flu shot this year. With others over the holidays who have similar symptoms.    Viral gastroenteritis likely - possible norovirus.  Advised fluids including Pedialyte, imodium otc for diarrhea, and Rx sent for Phenergan to take as needed for vomiting. Let me know if not improving.    Total Time: minutes: 11-20 minutes    Chela Mondragon was evaluated through a synchronous (real-time) audio encounter. Patient identification was verified at the start of the visit. She (or guardian if applicable) is aware that this is a billable service, which includes applicable co-pays. This visit was conducted with the patient's (and/or legal guardian's) verbal consent. She has not had a related appointment within my department in the past 7 days or scheduled within the next 24 hours.   The patient was located at Home: [address] [place] [id number].  The provider was located at Home (Appt Dept State): [place].  Confirm you are appropriately licensed, registered, or certified to deliver care in the state where the patient is located as indicated above. If you are not or unsure, please re-schedule the visit: Yes, I confirm.     Note: not billable if this call serves to triage the patient into an appointment for the relevant concern        ELMO BROWN MD

## 2025-01-15 NOTE — PROGRESS NOTES
Chela Mondragon (:  1995) is a 29 y.o. female, here for evaluation of the following chief complaint(s):    1 Month Follow-Up      ASSESSMENT/PLAN:  1. Moderate episode of recurrent major depressive disorder (HCC)  -   Stable on current dose of sertraline (ZOLOFT) 25 MG tablet; TAKE 1 TABLET BY MOUTH EVERY DAY, Disp-90 tablet, R-0Normal  Especially if folic acid level is low, will encourage Deplin.  She was found to be homozygous for MTHFR mutation.  2. Microcytosis  -     CBC; Future  -     Ferritin; Future  -     Iron and TIBC; Future  3. Vitamin D deficiency  -     Vitamin D 25 Hydroxy; Future  4. MTHFR mutation  -     Homocysteine; Future  5. Vertigo  -     Comprehensive Metabolic Panel; Future  Discussed a trial of meclizine and also a trial of Florinef.  Patient will call with results.    Return in about 3 months (around 2025).    SUBJECTIVE/OBJECTIVE:  HPI  Patient is here to follow-up anxiety and depression.  She remains on sertraline at the 25 mg dose.  When she was at [facility] they had advised Pristiq and Deplin but she has not started either of those.  She is considering starting Deplin and prefers to remain on Zoloft.  We did review her [employer] testing.  She has seen the neurologist at [facility] regarding her vestibular migraines.  He tried her on Topamax which she did not tolerate.  It made her tired.  She has seen the electrophysiologist regarding orthostatic hypotension and had a negative tilt table test.  She has seen ENT regarding vertigo and he had recommended VNG testing but she has not done that yet.    Patient was offered a promotion and hesitates on following through due to feeling unwell.  Encouraged her to utilize FMLA.    She does not necessarily stay well-hydrated.    PHQ-9 score of 5 today.    Her new gastroenterologist is Dr. Smith.    She remains on Protonix 40 mg daily.  We discussed her last EGD was normal so this would be a good time to try to wean the dose

## 2025-01-16 ENCOUNTER — OFFICE VISIT (OUTPATIENT)
Dept: INTERNAL MEDICINE CLINIC | Age: 30
End: 2025-01-16
Payer: COMMERCIAL

## 2025-01-16 VITALS
DIASTOLIC BLOOD PRESSURE: 66 MMHG | WEIGHT: 128 LBS | OXYGEN SATURATION: 98 % | SYSTOLIC BLOOD PRESSURE: 110 MMHG | HEART RATE: 96 BPM | BODY MASS INDEX: 25 KG/M2

## 2025-01-16 DIAGNOSIS — Z15.89 MTHFR MUTATION: ICD-10-CM

## 2025-01-16 DIAGNOSIS — R71.8 MICROCYTOSIS: ICD-10-CM

## 2025-01-16 DIAGNOSIS — E55.9 VITAMIN D DEFICIENCY: ICD-10-CM

## 2025-01-16 DIAGNOSIS — F33.1 MODERATE EPISODE OF RECURRENT MAJOR DEPRESSIVE DISORDER (HCC): ICD-10-CM

## 2025-01-16 DIAGNOSIS — R42 VERTIGO: ICD-10-CM

## 2025-01-16 DIAGNOSIS — F33.1 MODERATE EPISODE OF RECURRENT MAJOR DEPRESSIVE DISORDER (HCC): Primary | ICD-10-CM

## 2025-01-16 DIAGNOSIS — E53.8 FOLIC ACID DEFICIENCY: ICD-10-CM

## 2025-01-16 LAB
ALBUMIN SERPL-MCNC: 4.7 G/DL (ref 3.4–5)
ALBUMIN/GLOB SERPL: 1.8 {RATIO} (ref 1.1–2.2)
ALP SERPL-CCNC: 108 U/L (ref 40–129)
ALT SERPL-CCNC: 26 U/L (ref 10–40)
ANION GAP SERPL CALCULATED.3IONS-SCNC: 12 MMOL/L (ref 3–16)
AST SERPL-CCNC: 24 U/L (ref 15–37)
BILIRUB SERPL-MCNC: 0.4 MG/DL (ref 0–1)
BUN SERPL-MCNC: 12 MG/DL (ref 7–20)
CALCIUM SERPL-MCNC: 9.8 MG/DL (ref 8.3–10.6)
CHLORIDE SERPL-SCNC: 102 MMOL/L (ref 99–110)
CO2 SERPL-SCNC: 26 MMOL/L (ref 21–32)
CREAT SERPL-MCNC: 0.7 MG/DL (ref 0.6–1.1)
DEPRECATED RDW RBC AUTO: 12.8 % (ref 12.4–15.4)
FERRITIN SERPL IA-MCNC: 50.1 NG/ML (ref 15–150)
GFR SERPLBLD CREATININE-BSD FMLA CKD-EPI: >90 ML/MIN/{1.73_M2}
GLUCOSE SERPL-MCNC: 92 MG/DL (ref 70–99)
HCT VFR BLD AUTO: 41.1 % (ref 36–48)
HCYS SERPL-SCNC: 15 UMOL/L (ref 0–10)
HGB BLD-MCNC: 14 G/DL (ref 12–16)
IRON SATN MFR SERPL: 23 % (ref 15–50)
IRON SERPL-MCNC: 68 UG/DL (ref 37–145)
MCH RBC QN AUTO: 31.6 PG (ref 26–34)
MCHC RBC AUTO-ENTMCNC: 34.1 G/DL (ref 31–36)
MCV RBC AUTO: 92.6 FL (ref 80–100)
PLATELET # BLD AUTO: 350 K/UL (ref 135–450)
PMV BLD AUTO: 8.8 FL (ref 5–10.5)
POTASSIUM SERPL-SCNC: 3.9 MMOL/L (ref 3.5–5.1)
PROT SERPL-MCNC: 7.3 G/DL (ref 6.4–8.2)
RBC # BLD AUTO: 4.44 M/UL (ref 4–5.2)
SODIUM SERPL-SCNC: 140 MMOL/L (ref 136–145)
TIBC SERPL-MCNC: 300 UG/DL (ref 260–445)
WBC # BLD AUTO: 7 K/UL (ref 4–11)

## 2025-01-16 PROCEDURE — 99215 OFFICE O/P EST HI 40 MIN: CPT | Performed by: INTERNAL MEDICINE

## 2025-01-16 RX ORDER — MECLIZINE HCL 12.5 MG 12.5 MG/1
12.5 TABLET ORAL 3 TIMES DAILY PRN
Qty: 15 TABLET | Refills: 0 | Status: SHIPPED | OUTPATIENT
Start: 2025-01-16 | End: 2025-01-26

## 2025-01-16 RX ORDER — FLUDROCORTISONE ACETATE 0.1 MG/1
0.05 TABLET ORAL DAILY
Qty: 15 TABLET | Refills: 0 | Status: SHIPPED | OUTPATIENT
Start: 2025-01-16 | End: 2025-02-15

## 2025-01-16 RX ORDER — SERTRALINE HYDROCHLORIDE 25 MG/1
TABLET, FILM COATED ORAL
Qty: 90 TABLET | Refills: 0 | Status: SHIPPED | OUTPATIENT
Start: 2025-01-16

## 2025-01-16 RX ORDER — PANTOPRAZOLE SODIUM 20 MG/1
20 TABLET, DELAYED RELEASE ORAL EVERY MORNING
Qty: 90 TABLET | Refills: 0 | Status: SHIPPED | OUTPATIENT
Start: 2025-01-16 | End: 2025-04-16

## 2025-01-16 SDOH — ECONOMIC STABILITY: FOOD INSECURITY: WITHIN THE PAST 12 MONTHS, THE FOOD YOU BOUGHT JUST DIDN'T LAST AND YOU DIDN'T HAVE MONEY TO GET MORE.: NEVER TRUE

## 2025-01-16 SDOH — ECONOMIC STABILITY: FOOD INSECURITY: WITHIN THE PAST 12 MONTHS, YOU WORRIED THAT YOUR FOOD WOULD RUN OUT BEFORE YOU GOT MONEY TO BUY MORE.: NEVER TRUE

## 2025-01-16 ASSESSMENT — PATIENT HEALTH QUESTIONNAIRE - PHQ9
SUM OF ALL RESPONSES TO PHQ QUESTIONS 1-9: 5
7. TROUBLE CONCENTRATING ON THINGS, SUCH AS READING THE NEWSPAPER OR WATCHING TELEVISION: NOT AT ALL
2. FEELING DOWN, DEPRESSED OR HOPELESS: SEVERAL DAYS
10. IF YOU CHECKED OFF ANY PROBLEMS, HOW DIFFICULT HAVE THESE PROBLEMS MADE IT FOR YOU TO DO YOUR WORK, TAKE CARE OF THINGS AT HOME, OR GET ALONG WITH OTHER PEOPLE: SOMEWHAT DIFFICULT
9. THOUGHTS THAT YOU WOULD BE BETTER OFF DEAD, OR OF HURTING YOURSELF: NOT AT ALL
1. LITTLE INTEREST OR PLEASURE IN DOING THINGS: SEVERAL DAYS
SUM OF ALL RESPONSES TO PHQ QUESTIONS 1-9: 5
SUM OF ALL RESPONSES TO PHQ QUESTIONS 1-9: 5
SUM OF ALL RESPONSES TO PHQ9 QUESTIONS 1 & 2: 2
3. TROUBLE FALLING OR STAYING ASLEEP: SEVERAL DAYS
5. POOR APPETITE OR OVEREATING: NOT AT ALL
6. FEELING BAD ABOUT YOURSELF - OR THAT YOU ARE A FAILURE OR HAVE LET YOURSELF OR YOUR FAMILY DOWN: SEVERAL DAYS
4. FEELING TIRED OR HAVING LITTLE ENERGY: SEVERAL DAYS
8. MOVING OR SPEAKING SO SLOWLY THAT OTHER PEOPLE COULD HAVE NOTICED. OR THE OPPOSITE, BEING SO FIGETY OR RESTLESS THAT YOU HAVE BEEN MOVING AROUND A LOT MORE THAN USUAL: NOT AT ALL
SUM OF ALL RESPONSES TO PHQ QUESTIONS 1-9: 5

## 2025-01-16 NOTE — PATIENT INSTRUCTIONS
Take pantoprazole 40 mg alternating with 20 mg every other day for 2 weeks and then 20 mg daily    Eventually - 20 mg every other day for 2 weeks, then every 3d day for 2 weeks, then 1/week for 2 weeks, then stop    Go back to ENT - [facility] - VNG test    Meantime -- try meclizine and/or florinef

## 2025-01-17 ENCOUNTER — PATIENT MESSAGE (OUTPATIENT)
Dept: INTERNAL MEDICINE CLINIC | Age: 30
End: 2025-01-17

## 2025-01-17 LAB
25(OH)D3 SERPL-MCNC: 24.7 NG/ML
FOLATE SERPL-MCNC: 12 NG/ML (ref 4.78–24.2)
VIT B12 SERPL-MCNC: 364 PG/ML (ref 211–911)

## 2025-01-17 NOTE — TELEPHONE ENCOUNTER
Levels are normal and of no concern.  Specifically, no evidence of deficiency.  She can discuss further with Dr. Mina.

## 2025-02-04 ENCOUNTER — PROCEDURE VISIT (OUTPATIENT)
Dept: AUDIOLOGY | Age: 30
End: 2025-02-04
Payer: COMMERCIAL

## 2025-02-04 ENCOUNTER — OFFICE VISIT (OUTPATIENT)
Dept: ENT CLINIC | Age: 30
End: 2025-02-04
Payer: COMMERCIAL

## 2025-02-04 VITALS
RESPIRATION RATE: 16 BRPM | DIASTOLIC BLOOD PRESSURE: 74 MMHG | WEIGHT: 129 LBS | HEART RATE: 78 BPM | SYSTOLIC BLOOD PRESSURE: 133 MMHG | BODY MASS INDEX: 25.32 KG/M2 | HEIGHT: 60 IN | TEMPERATURE: 97.3 F

## 2025-02-04 DIAGNOSIS — Z01.10 NORMAL HEARING TEST OF BOTH EARS: ICD-10-CM

## 2025-02-04 DIAGNOSIS — G43.809 VESTIBULAR MIGRAINE: ICD-10-CM

## 2025-02-04 DIAGNOSIS — J30.9 ALLERGIC RHINITIS, UNSPECIFIED SEASONALITY, UNSPECIFIED TRIGGER: ICD-10-CM

## 2025-02-04 DIAGNOSIS — Z01.10 NORMAL HEARING TEST: ICD-10-CM

## 2025-02-04 DIAGNOSIS — H93.13 TINNITUS OF BOTH EARS: ICD-10-CM

## 2025-02-04 DIAGNOSIS — R42 DIZZINESS: Primary | ICD-10-CM

## 2025-02-04 PROCEDURE — 99213 OFFICE O/P EST LOW 20 MIN: CPT | Performed by: OTOLARYNGOLOGY

## 2025-02-04 PROCEDURE — 92567 TYMPANOMETRY: CPT

## 2025-02-04 PROCEDURE — 92557 COMPREHENSIVE HEARING TEST: CPT

## 2025-02-04 ASSESSMENT — ENCOUNTER SYMPTOMS
COUGH: 0
EYE PAIN: 0
DIARRHEA: 0
TROUBLE SWALLOWING: 0
COLOR CHANGE: 0
SORE THROAT: 0
SINUS PAIN: 0
EYE ITCHING: 0
PHOTOPHOBIA: 1
STRIDOR: 0
SINUS PRESSURE: 0
FACIAL SWELLING: 0
SHORTNESS OF BREATH: 0
VOICE CHANGE: 0
RHINORRHEA: 0
CHOKING: 0
EYE REDNESS: 0
NAUSEA: 0

## 2025-02-04 NOTE — PROGRESS NOTES
[facility]  [address], [address]  [place] [address]  P: [phone]  F: [phone]       Patient     Chela Mondragon  1995    ChiefComplaint     Chief Complaint   Patient presents with    Dizziness     Having a lot of dizziness off balance it feels like a boat rocking does have vestibular migraines        History of Present Illness     Chela Mondragon is a pleasant 29 y.o. female known to me presents for issues for vestibular migraine.  Having dizziness, light sensitivity, sound sensitivity, nausea.  Patient also has a history of Crohn's, chronic sinusitis and allergic rhinitis.  She was supposed to undergo allergy testing but did not.  She recently saw neurology and was placed on Topamax.  She stopped the medication due to side effects.  She does take sertraline daily.  She does have vitamin D deficiency.    Audiogram performed the office today is normal.    Past Medical History     Past Medical History:   Diagnosis Date    Abdominal bloating     h pylori pos    Anxiety 03/01/2018    B12 deficiency     Chronic sinusitis     path report - dense \"eosinophilic infiltrate\"    COVID-19 12/17/2020    Crohn's disease of small intestine (HCC) 05/2021    Dizziness     GERD (gastroesophageal reflux disease)     Interstitial cystitis     Moderate episode of recurrent major depressive disorder (HCC) 10/03/2022    MTHFR mutation     genesight testing    Non-allergic rhinitis     saw allergist dr wilson, negative for trees, grasses, molds    Nosebleed     Overactive bladder     Tachycardia 01/28/208    ectopic atrial rhythm, inappropriate sinus tachycardia    Tinnitus     Urge incontinence     interstial cystitis - dr britton pineda- urogyn-pelvic floor rehab       Past Surgical History     Past Surgical History:   Procedure Laterality Date    COLONOSCOPY  04/29/2021    terminal ileum ulcers, path pdg-possible crohn's    COLPOSCOPY      for ascus - [facility]    CYSTOSCOPY

## 2025-02-04 NOTE — PATIENT INSTRUCTIONS
Vitamin D daily.  Migraine supplements daily.  Ask PCP about switching sertraline to venlafaxine.  Allergy testing.

## 2025-02-04 NOTE — PROGRESS NOTES
Chela Mondragon   1995, 29 y.o. female   [id number]       Referring Provider: Sriram Lucio DO  Referral Type: In an order in Epic    Reason for Visit: Evaluation of suspected change in hearing, tinnitus, or balance.    ADULT AUDIOLOGIC EVALUATION      Chela Mondragon is a 29 y.o. female seen today, 2/4/2025 , for an initial audiologic evaluation.  Patient was seen by Sriram Lucio DO following today's evaluation.    AUDIOLOGIC AND OTHER PERTINENT MEDICAL HISTORY:      Chela Mondragon reports dizziness and vertigo for a while.  She stated that she saw a neurologist last year who diagnosed her with vestibular migraines.  Patient reported that the dizziness has gotten worse over the past few months so her PCP recommended she see ENT.  Patient stated that the dizziness occasionally feels like vertigo, but more often just feels like imbalance.  She noted that when she walks she feels as if she is rocking patient also endorsed brain fog during her episodes of dizziness.  She denied any concerns for her hearing.  She endorsed occasional tinnitus    She denied otalgia, otorrhea, history of falls, history of noise exposure, history of head trauma, history of ear surgery, and family history of hearing loss    Date: 2/4/2025     IMPRESSIONS:      Today's results revealed Normal hearing 250-8000 Hz bilaterally. Excellent speech understanding when in quiet. Tympanometry indicates normal middle ear function. Discussed test results and implications with patient.    Follow medical recommendations of Sriram Lucio DO.     ASSESSMENT AND FINDINGS:     Otoscopy unremarkable.    RIGHT EAR:  Hearing Sensitivity: Normal hearing sensitivity 250-8000 Hz  Speech Recognition Threshold: 10 dB HL  Word Recognition: Excellent 100%, based on NU-6 by-difficulty list at 50 dBHL using recorded speech stimuli.    Tympanometry: Normal peak pressure and compliance, Type A tympanogram, consistent with normal middle ear function.       LEFT

## 2025-02-25 DIAGNOSIS — F33.1 MODERATE EPISODE OF RECURRENT MAJOR DEPRESSIVE DISORDER (HCC): ICD-10-CM

## 2025-02-25 RX ORDER — SERTRALINE HYDROCHLORIDE 25 MG/1
TABLET, FILM COATED ORAL
Qty: 90 TABLET | Refills: 0 | Status: CANCELLED | OUTPATIENT
Start: 2025-02-25

## 2025-02-27 ENCOUNTER — PATIENT MESSAGE (OUTPATIENT)
Dept: INTERNAL MEDICINE CLINIC | Age: 30
End: 2025-02-27

## 2025-02-27 DIAGNOSIS — F33.1 MODERATE EPISODE OF RECURRENT MAJOR DEPRESSIVE DISORDER (HCC): ICD-10-CM

## 2025-02-27 RX ORDER — SERTRALINE HYDROCHLORIDE 25 MG/1
TABLET, FILM COATED ORAL
Qty: 90 TABLET | Refills: 0 | Status: CANCELLED | OUTPATIENT
Start: 2025-02-27

## 2025-02-27 RX ORDER — FERROUS SULFATE 325(65) MG
325 TABLET ORAL
Qty: 90 TABLET | Refills: 1 | Status: SHIPPED | OUTPATIENT
Start: 2025-02-27

## 2025-02-27 NOTE — TELEPHONE ENCOUNTER
Called pharmacy, and sertraline 25mg daily qty 90    Did not go over electronically on 1/16 with the pantoprazole     I gave pharmacist verbal to fill according to provider already approved script.     They are getting this ready for her now.       I did call patient to make her aware,   However I had to leave a message for return call.       I am also placing the message in reply to her original [employer] message to us.

## 2025-02-27 NOTE — TELEPHONE ENCOUNTER
Patient is calling in fo refill request that she has been waiting on since 2.25.25.     Please advise.

## 2025-02-27 NOTE — TELEPHONE ENCOUNTER
Last appointment: 1/16/2025  Next appointment: Visit date not found  Return in about 3 months (around 4/16/2025).      Sent [employer] message to schedule next appointment due in April.    Last refill: 2/12/24

## 2025-03-24 ENCOUNTER — HOSPITAL ENCOUNTER (EMERGENCY)
Age: 30
Discharge: HOME | End: 2025-03-24

## 2025-03-24 VITALS
OXYGEN SATURATION: 99 % | BODY MASS INDEX: 25.19 KG/M2 | HEIGHT: 60 IN | HEART RATE: 82 BPM | TEMPERATURE: 98 F | RESPIRATION RATE: 14 BRPM | DIASTOLIC BLOOD PRESSURE: 83 MMHG | SYSTOLIC BLOOD PRESSURE: 134 MMHG

## 2025-03-24 DIAGNOSIS — M25.512 ACUTE PAIN OF LEFT SHOULDER: ICD-10-CM

## 2025-03-24 DIAGNOSIS — M54.2 NECK PAIN: Primary | ICD-10-CM

## 2025-03-24 PROCEDURE — 6370000000 HC RX 637 (ALT 250 FOR IP): Performed by: PHYSICIAN ASSISTANT

## 2025-03-24 PROCEDURE — 96372 THER/PROPH/DIAG INJ SC/IM: CPT

## 2025-03-24 PROCEDURE — 6360000002 HC RX W HCPCS: Performed by: PHYSICIAN ASSISTANT

## 2025-03-24 PROCEDURE — 99284 EMERGENCY DEPT VISIT MOD MDM: CPT

## 2025-03-24 RX ORDER — LIDOCAINE 4 G/G
1 PATCH TOPICAL DAILY
Status: DISCONTINUED | OUTPATIENT
Start: 2025-03-24 | End: 2025-03-24 | Stop reason: HOSPADM

## 2025-03-24 RX ORDER — LIDOCAINE 50 MG/G
1 PATCH TOPICAL DAILY
Qty: 30 PATCH | Refills: 0 | Status: SHIPPED | OUTPATIENT
Start: 2025-03-24

## 2025-03-24 RX ORDER — METHOCARBAMOL 750 MG/1
750 TABLET, FILM COATED ORAL 3 TIMES DAILY
Qty: 21 TABLET | Refills: 0 | Status: SHIPPED | OUTPATIENT
Start: 2025-03-24 | End: 2025-03-31

## 2025-03-24 RX ORDER — KETOROLAC TROMETHAMINE 30 MG/ML
15 INJECTION, SOLUTION INTRAMUSCULAR; INTRAVENOUS ONCE
Status: COMPLETED | OUTPATIENT
Start: 2025-03-24 | End: 2025-03-24

## 2025-03-24 RX ORDER — METHOCARBAMOL 500 MG/1
750 TABLET, FILM COATED ORAL 4 TIMES DAILY
Status: DISCONTINUED | OUTPATIENT
Start: 2025-03-24 | End: 2025-03-24 | Stop reason: HOSPADM

## 2025-03-24 RX ADMIN — KETOROLAC TROMETHAMINE 15 MG: 30 INJECTION, SOLUTION INTRAMUSCULAR at 20:12

## 2025-03-24 RX ADMIN — METHOCARBAMOL 750 MG: 500 TABLET ORAL at 20:12

## 2025-03-24 ASSESSMENT — PAIN DESCRIPTION - DESCRIPTORS: DESCRIPTORS: BURNING;SHARP;SHOOTING

## 2025-03-24 ASSESSMENT — PAIN DESCRIPTION - DIRECTION: RADIATING_TOWARDS: ARM

## 2025-03-24 ASSESSMENT — PAIN DESCRIPTION - ORIENTATION: ORIENTATION: LEFT

## 2025-03-24 ASSESSMENT — PAIN DESCRIPTION - PAIN TYPE: TYPE: ACUTE PAIN

## 2025-03-24 ASSESSMENT — LIFESTYLE VARIABLES
HOW OFTEN DO YOU HAVE A DRINK CONTAINING ALCOHOL: 2-4 TIMES A MONTH
HOW MANY STANDARD DRINKS CONTAINING ALCOHOL DO YOU HAVE ON A TYPICAL DAY: 1 OR 2

## 2025-03-24 ASSESSMENT — PAIN - FUNCTIONAL ASSESSMENT: PAIN_FUNCTIONAL_ASSESSMENT: 0-10

## 2025-03-24 ASSESSMENT — PAIN DESCRIPTION - LOCATION: LOCATION: NECK;SHOULDER

## 2025-03-24 ASSESSMENT — PAIN DESCRIPTION - FREQUENCY: FREQUENCY: CONTINUOUS

## 2025-03-24 ASSESSMENT — ENCOUNTER SYMPTOMS
COLOR CHANGE: 0
BACK PAIN: 0

## 2025-03-24 ASSESSMENT — PAIN SCALES - GENERAL: PAINLEVEL_OUTOF10: 8
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
## 2025-03-25 ENCOUNTER — TELEPHONE (OUTPATIENT)
Dept: INTERNAL MEDICINE CLINIC | Age: 30
End: 2025-03-25

## 2025-03-25 NOTE — DISCHARGE INSTRUCTIONS
As we discussed, avoid strenuous activity until symptoms improve.  You can take over-the-counter pain medication as directed as needed for discomfort.  You can apply Lidoderm patches as directed as needed for discomfort.  You can take Robaxin as directed as needed. Note that this medication can cause sedation and you should not operate heavy machinery including driving a vehicle while taking this medication  You can apply heat to the area for relief. Do not apply heat over Lidoderm patches as this can cause burn injury  Return to the emergency department with loss of bowel or bladder control, numbness or tingling in your groin, intolerable pain, new injuries, inability to walk, fevers or other concerning symptoms

## 2025-03-25 NOTE — TELEPHONE ENCOUNTER
Patient went to ER due to neck pain.  Please offer her a follow-up visit if symptoms persist.  Tomorrow at noon.

## 2025-03-25 NOTE — ED PROVIDER NOTES
THE [facility]  EMERGENCY DEPARTMENT ENCOUNTER          PHYSICIAN ASSISTANT NOTE       Date of evaluation: 3/24/2025    Chief Complaint     Shoulder Pain and Neck Pain      History of Present Illness     Chela Mondragon is a 29 y.o. female with a past medical history as detailed in her chart who comes to the emergency department today complaining of left-sided neck, left shoulder, left arm pain that has been ongoing for the past 24 hours.  Pain is worse when she raises her arm, turns her neck to the left.  Patient denies any preceding injury or prior similar symptoms.  She does also note some intermittent tingling in her left arm and hand.  She also describes some intermittent feelings of lack of strength in her left arm since symptoms began.  She denies new pain elsewhere.  She does note that she has been ill recently with a mildly productive cough, nasal congestion, fatigue.  Her partner is with her who has had similar symptoms.  She was tested for influenza which was negative.  She has not been tested for COVID.  She declines COVID/influenza testing at this time as symptoms are improving.  Patient denies new pain elsewhere, other complaints.  She denies oral birth control, recent surgeries or immobilization, pain or swelling, hormone replacement therapy, prior blood clots.  She denies focal weakness, difficulty walking.  Review of Systems     Review of Systems   Cardiovascular:  Negative for chest pain and leg swelling.   Musculoskeletal:  Positive for neck pain. Negative for back pain and joint swelling.   Skin:  Negative for color change, rash and wound.   Neurological:  Positive for numbness.       Past Medical, Surgical, Family, and Social History     She has a past medical history of Abdominal bloating, Anxiety, B12 deficiency, Chronic sinusitis, COVID-19, Crohn's disease of small intestine (HCC), Dizziness, GERD (gastroesophageal reflux disease), Interstitial cystitis, Moderate episode of recurrent

## 2025-03-26 ENCOUNTER — SCHEDULED TELEPHONE ENCOUNTER (OUTPATIENT)
Dept: INTERNAL MEDICINE CLINIC | Age: 30
End: 2025-03-26
Payer: COMMERCIAL

## 2025-03-26 DIAGNOSIS — M54.2 NECK PAIN: Primary | ICD-10-CM

## 2025-03-26 PROCEDURE — 99447 NTRPROF PH1/NTRNET/EHR 11-20: CPT | Performed by: INTERNAL MEDICINE

## 2025-03-26 RX ORDER — METHYLPREDNISOLONE 4 MG/1
TABLET ORAL
Qty: 1 KIT | Refills: 0 | Status: SHIPPED | OUTPATIENT
Start: 2025-03-26 | End: 2025-04-01

## 2025-03-26 NOTE — PATIENT INSTRUCTIONS
[facility]  [address]  No appt needed    Medrol dose pack    Physical therapy --   [facility]  [address]  [address]  [facility] [address]         Loc/POS:                Phone: [phone]       Tylenol up to 500 mg every 8 hours
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
## 2025-03-27 NOTE — PLAN OF CARE
Manual [phone]  Estim Unattended (85033)     Ther. Act (08381)    Mech. Traction (46269)     Gait (19553)    Dry Needle 1-2 muscle (96593)     Aquatic Therex (86802)    Dry Needle 3+ muscle (70571)     Iontophoresis (25675)    VASO (83484)     Ultrasound (49761)    Group Therapy (83864)     Estim Attended (68090)    Canalith Repositioning (33081)     Physical Performance Test (94416)    Custom orthotic ()     Other:    Other:    Total Timed Code Tx Minutes 25 2  1     Total Treatment Minutes 45        Charge Justification:  (18520) THERAPEUTIC EXERCISE - Provided verbal/tactile cueing for HEP and/or activities related to strengthening, flexibility, endurance, ROM performed to prevent loss of range of motion, maintain or improve muscular strength or increase flexibility, following either an injury or surgery.   (79201) MANUAL THERAPY -  Manual therapy techniques, 1 or more regions, each 15 minutes (Mobilization/manipulation, manual lymphatic drainage, manual traction) for the purpose of modulating pain, promoting relaxation,  increasing ROM, reducing/eliminating soft tissue swelling/inflammation/restriction, improving soft tissue extensibility and allowing for proper ROM for normal function with self care, mobility, lifting and ambulation    GOALS     Patient stated goal: no pain at work and sleeping  [] Progressing: [] Met: [] Not Met: [] Adjusted    Therapist goals for Patient:   Short Term Goals: To be achieved in: 2 weeks  1Independent in HEP and progression per patient tolerance, in order to prevent re-injury.   [] Progressing: [] Met: [] Not Met: [] Adjusted  Patient will have a decrease in pain to <2/10 to facilitate improvement in movement, function, and ADLs as indicated by Functional Deficits.  [] Progressing: [] Met: [] Not Met: [] Adjusted    Long Term Goals: To be achieved in: 6 weeks  Disability index score of 20% or less for the Neck Disability Index to assist with reaching prior level of

## 2025-03-28 ENCOUNTER — HOSPITAL ENCOUNTER (OUTPATIENT)
Dept: PHYSICAL THERAPY | Age: 30
Setting detail: THERAPIES SERIES
Discharge: HOME | End: 2025-03-28
Payer: OTHER MISCELLANEOUS

## 2025-03-28 DIAGNOSIS — M54.2 CERVICAL SPINE PAIN: Primary | ICD-10-CM

## 2025-03-28 DIAGNOSIS — M25.512 LEFT SHOULDER PAIN, UNSPECIFIED CHRONICITY: ICD-10-CM

## 2025-03-28 PROCEDURE — 97110 THERAPEUTIC EXERCISES: CPT

## 2025-03-28 PROCEDURE — 97140 MANUAL THERAPY 1/> REGIONS: CPT

## 2025-03-28 PROCEDURE — 97161 PT EVAL LOW COMPLEX 20 MIN: CPT

## 2025-04-01 ENCOUNTER — HOSPITAL ENCOUNTER (OUTPATIENT)
Dept: PHYSICAL THERAPY | Age: 30
Setting detail: THERAPIES SERIES
Discharge: HOME | End: 2025-04-01
Payer: COMMERCIAL

## 2025-04-01 PROCEDURE — 97140 MANUAL THERAPY 1/> REGIONS: CPT

## 2025-04-01 PROCEDURE — 97110 THERAPEUTIC EXERCISES: CPT
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
## 2025-04-09 ENCOUNTER — HOSPITAL ENCOUNTER (OUTPATIENT)
Dept: PHYSICAL THERAPY | Age: 30
Setting detail: THERAPIES SERIES
End: 2025-04-09
Payer: OTHER MISCELLANEOUS

## 2025-04-16 ENCOUNTER — HOSPITAL ENCOUNTER (OUTPATIENT)
Dept: PHYSICAL THERAPY | Age: 30
Setting detail: THERAPIES SERIES
Discharge: HOME | End: 2025-04-16
Payer: COMMERCIAL

## 2025-04-16 PROCEDURE — 97110 THERAPEUTIC EXERCISES: CPT

## 2025-04-16 PROCEDURE — 97140 MANUAL THERAPY 1/> REGIONS: CPT
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
## 2025-04-30 ENCOUNTER — HOSPITAL ENCOUNTER (OUTPATIENT)
Dept: PHYSICAL THERAPY | Age: 30
Setting detail: THERAPIES SERIES
End: 2025-04-30
Payer: OTHER MISCELLANEOUS

## (undated) DEVICE — STANDARD HYPODERMIC NEEDLE,POLYPROPYLENE HUB: Brand: MONOJECT

## (undated) DEVICE — TOWEL,OR,DSP,ST,BLUE,DLX,8/PK,10PK/CS: Brand: MEDLINE

## (undated) DEVICE — SOLUTION IV 1000ML 0.9% SOD CHL

## (undated) DEVICE — DRAPE,INSTRUMENT,MAGNETIC,10X16: Brand: MEDLINE

## (undated) DEVICE — TOWEL,STOP FLAG GOLD N-W: Brand: MEDLINE

## (undated) DEVICE — NASAL FESS: Brand: MEDLINE INDUSTRIES, INC.

## (undated) DEVICE — BLADE 1882040HR 5PK M4 INF TURB 2MM ROT: Brand: STRAIGHTSHOT

## (undated) DEVICE — SHEATH 1912000 5PK 4MM/0DEG STORZ XOMED: Brand: ENDO-SCRUB®

## (undated) DEVICE — SPLINT 1524055 DOYLE II AIRWAY SET: Brand: DOYLE II ™

## (undated) DEVICE — INTENDED FOR TISSUE SEPARATION, AND OTHER PROCEDURES THAT REQUIRE A SHARP SURGICAL BLADE TO PUNCTURE OR CUT.: Brand: BARD-PARKER ® CARBON RIB-BACK BLADES

## (undated) DEVICE — ELECTRODE ELECSURG NDL 2.8 INX7.2 CM COAT INSUL EDGE

## (undated) DEVICE — GLOVE ORANGE PI 7 1/2   MSG9075

## (undated) DEVICE — SPONGE,NEURO,1"X3",XR,STRL,LF,10/PK: Brand: MEDLINE

## (undated) DEVICE — SUTURE CHROMIC GUT SZ 4-0 L18IN ABSRB BRN L13MM P-3 3/8 CIR 1654G

## (undated) DEVICE — TUBE SUCT LAPSCP

## (undated) DEVICE — SUTURE PROL SZ 3-0 L30IN NONABSORBABLE BLU L60MM KS STR REV 8622H

## (undated) DEVICE — KIT,ANTI FOG,W/SPONGE & FLUID,SOFT PACK: Brand: MEDLINE